# Patient Record
Sex: FEMALE | Race: WHITE | HISPANIC OR LATINO | Employment: OTHER | URBAN - METROPOLITAN AREA
[De-identification: names, ages, dates, MRNs, and addresses within clinical notes are randomized per-mention and may not be internally consistent; named-entity substitution may affect disease eponyms.]

---

## 2023-01-01 PROBLEM — N39.0 UTI (URINARY TRACT INFECTION): Status: RESOLVED | Noted: 2021-11-17 | Resolved: 2023-01-01

## 2023-08-30 ENCOUNTER — APPOINTMENT (EMERGENCY)
Dept: RADIOLOGY | Facility: HOSPITAL | Age: 73
DRG: 419 | End: 2023-08-30
Payer: COMMERCIAL

## 2023-08-30 ENCOUNTER — APPOINTMENT (EMERGENCY)
Dept: CT IMAGING | Facility: HOSPITAL | Age: 73
DRG: 419 | End: 2023-08-30
Payer: COMMERCIAL

## 2023-08-30 ENCOUNTER — HOSPITAL ENCOUNTER (INPATIENT)
Facility: HOSPITAL | Age: 73
LOS: 7 days | Discharge: HOME | DRG: 419 | End: 2023-09-07
Attending: EMERGENCY MEDICINE | Admitting: INTERNAL MEDICINE
Payer: COMMERCIAL

## 2023-08-30 DIAGNOSIS — K81.0 ACUTE CHOLECYSTITIS: ICD-10-CM

## 2023-08-30 DIAGNOSIS — R10.9 ABDOMINAL PAIN: Primary | ICD-10-CM

## 2023-08-30 DIAGNOSIS — U07.1 COVID-19: ICD-10-CM

## 2023-08-30 DIAGNOSIS — R07.9 CHEST PAIN: ICD-10-CM

## 2023-08-30 LAB
2HR DELTA HS TROPONIN: 0 NG/L
4HR DELTA HS TROPONIN: 0 NG/L
ALBUMIN SERPL BCG-MCNC: 4.2 G/DL (ref 3.5–5)
ALP SERPL-CCNC: 74 U/L (ref 34–104)
ALT SERPL W P-5'-P-CCNC: 43 U/L (ref 7–52)
ANION GAP SERPL CALCULATED.3IONS-SCNC: 9 MMOL/L
AST SERPL W P-5'-P-CCNC: 24 U/L (ref 13–39)
ATRIAL RATE: 60 BPM
BACTERIA UR QL AUTO: ABNORMAL /HPF
BASOPHILS # BLD AUTO: 0.04 THOUSANDS/ÂΜL (ref 0–0.1)
BASOPHILS NFR BLD AUTO: 0 % (ref 0–1)
BILIRUB SERPL-MCNC: 0.51 MG/DL (ref 0.2–1)
BILIRUB UR QL STRIP: NEGATIVE
BUN SERPL-MCNC: 18 MG/DL (ref 5–25)
CALCIUM SERPL-MCNC: 9.4 MG/DL (ref 8.4–10.2)
CARDIAC TROPONIN I PNL SERPL HS: 3 NG/L
CHLORIDE SERPL-SCNC: 100 MMOL/L (ref 96–108)
CLARITY UR: ABNORMAL
CO2 SERPL-SCNC: 26 MMOL/L (ref 21–32)
COLOR UR: YELLOW
CREAT SERPL-MCNC: 0.72 MG/DL (ref 0.6–1.3)
D DIMER PPP FEU-MCNC: 1.42 UG/ML FEU
EOSINOPHIL # BLD AUTO: 0.12 THOUSAND/ÂΜL (ref 0–0.61)
EOSINOPHIL NFR BLD AUTO: 1 % (ref 0–6)
ERYTHROCYTE [DISTWIDTH] IN BLOOD BY AUTOMATED COUNT: 13.6 % (ref 11.6–15.1)
FLUAV RNA RESP QL NAA+PROBE: NEGATIVE
FLUBV RNA RESP QL NAA+PROBE: NEGATIVE
GFR SERPL CREATININE-BSD FRML MDRD: 82 ML/MIN/1.73SQ M
GLUCOSE SERPL-MCNC: 267 MG/DL (ref 65–140)
GLUCOSE SERPL-MCNC: 267 MG/DL (ref 65–140)
GLUCOSE SERPL-MCNC: 282 MG/DL (ref 65–140)
GLUCOSE SERPL-MCNC: 306 MG/DL (ref 65–140)
GLUCOSE SERPL-MCNC: 352 MG/DL (ref 65–140)
GLUCOSE SERPL-MCNC: 385 MG/DL (ref 65–140)
GLUCOSE UR STRIP-MCNC: ABNORMAL MG/DL
HCT VFR BLD AUTO: 38.9 % (ref 34.8–46.1)
HGB BLD-MCNC: 13 G/DL (ref 11.5–15.4)
HGB UR QL STRIP.AUTO: ABNORMAL
IMM GRANULOCYTES # BLD AUTO: 0.05 THOUSAND/UL (ref 0–0.2)
IMM GRANULOCYTES NFR BLD AUTO: 1 % (ref 0–2)
KETONES UR STRIP-MCNC: ABNORMAL MG/DL
LACTATE SERPL-SCNC: 1.5 MMOL/L (ref 0.5–2)
LEUKOCYTE ESTERASE UR QL STRIP: ABNORMAL
LIPASE SERPL-CCNC: 72 U/L (ref 11–82)
LYMPHOCYTES # BLD AUTO: 1.43 THOUSANDS/ÂΜL (ref 0.6–4.47)
LYMPHOCYTES NFR BLD AUTO: 15 % (ref 14–44)
MAGNESIUM SERPL-MCNC: 1.6 MG/DL (ref 1.9–2.7)
MCH RBC QN AUTO: 30.2 PG (ref 26.8–34.3)
MCHC RBC AUTO-ENTMCNC: 33.4 G/DL (ref 31.4–37.4)
MCV RBC AUTO: 91 FL (ref 82–98)
MONOCYTES # BLD AUTO: 0.5 THOUSAND/ÂΜL (ref 0.17–1.22)
MONOCYTES NFR BLD AUTO: 5 % (ref 4–12)
NEUTROPHILS # BLD AUTO: 7.13 THOUSANDS/ÂΜL (ref 1.85–7.62)
NEUTS SEG NFR BLD AUTO: 78 % (ref 43–75)
NITRITE UR QL STRIP: NEGATIVE
NON-SQ EPI CELLS URNS QL MICRO: ABNORMAL /HPF
NRBC BLD AUTO-RTO: 0 /100 WBCS
P AXIS: 37 DEGREES
PH UR STRIP.AUTO: 5.5 [PH]
PHOSPHATE SERPL-MCNC: 2.9 MG/DL (ref 2.3–4.1)
PLATELET # BLD AUTO: 335 THOUSANDS/UL (ref 149–390)
PMV BLD AUTO: 9.8 FL (ref 8.9–12.7)
POTASSIUM SERPL-SCNC: 4 MMOL/L (ref 3.5–5.3)
PR INTERVAL: 170 MS
PROT SERPL-MCNC: 8 G/DL (ref 6.4–8.4)
PROT UR STRIP-MCNC: NEGATIVE MG/DL
QRS AXIS: -37 DEGREES
QRSD INTERVAL: 84 MS
QT INTERVAL: 404 MS
QTC INTERVAL: 404 MS
RBC # BLD AUTO: 4.3 MILLION/UL (ref 3.81–5.12)
RBC #/AREA URNS AUTO: ABNORMAL /HPF
RSV RNA RESP QL NAA+PROBE: NEGATIVE
SARS-COV-2 RNA RESP QL NAA+PROBE: NEGATIVE
SODIUM SERPL-SCNC: 135 MMOL/L (ref 135–147)
SP GR UR STRIP.AUTO: 1.01 (ref 1–1.03)
T WAVE AXIS: 83 DEGREES
UROBILINOGEN UR QL STRIP.AUTO: 0.2 E.U./DL
VENTRICULAR RATE: 60 BPM
WBC # BLD AUTO: 9.27 THOUSAND/UL (ref 4.31–10.16)
WBC #/AREA URNS AUTO: ABNORMAL /HPF

## 2023-08-30 PROCEDURE — 85025 COMPLETE CBC W/AUTO DIFF WBC: CPT | Performed by: EMERGENCY MEDICINE

## 2023-08-30 PROCEDURE — 99285 EMERGENCY DEPT VISIT HI MDM: CPT | Performed by: EMERGENCY MEDICINE

## 2023-08-30 PROCEDURE — 85379 FIBRIN DEGRADATION QUANT: CPT

## 2023-08-30 PROCEDURE — 74176 CT ABD & PELVIS W/O CONTRAST: CPT

## 2023-08-30 PROCEDURE — 96375 TX/PRO/DX INJ NEW DRUG ADDON: CPT

## 2023-08-30 PROCEDURE — 1124F ACP DISCUSS-NO DSCNMKR DOCD: CPT | Performed by: INTERNAL MEDICINE

## 2023-08-30 PROCEDURE — 93010 ELECTROCARDIOGRAM REPORT: CPT | Performed by: INTERNAL MEDICINE

## 2023-08-30 PROCEDURE — 83605 ASSAY OF LACTIC ACID: CPT

## 2023-08-30 PROCEDURE — 71250 CT THORAX DX C-: CPT

## 2023-08-30 PROCEDURE — 96366 THER/PROPH/DIAG IV INF ADDON: CPT

## 2023-08-30 PROCEDURE — 0241U HB NFCT DS VIR RESP RNA 4 TRGT: CPT

## 2023-08-30 PROCEDURE — 83735 ASSAY OF MAGNESIUM: CPT

## 2023-08-30 PROCEDURE — 81001 URINALYSIS AUTO W/SCOPE: CPT

## 2023-08-30 PROCEDURE — 96372 THER/PROPH/DIAG INJ SC/IM: CPT

## 2023-08-30 PROCEDURE — 96365 THER/PROPH/DIAG IV INF INIT: CPT

## 2023-08-30 PROCEDURE — 99284 EMERGENCY DEPT VISIT MOD MDM: CPT

## 2023-08-30 PROCEDURE — 82948 REAGENT STRIP/BLOOD GLUCOSE: CPT

## 2023-08-30 PROCEDURE — 80053 COMPREHEN METABOLIC PANEL: CPT | Performed by: EMERGENCY MEDICINE

## 2023-08-30 PROCEDURE — 71045 X-RAY EXAM CHEST 1 VIEW: CPT

## 2023-08-30 PROCEDURE — G1004 CDSM NDSC: HCPCS

## 2023-08-30 PROCEDURE — 99223 1ST HOSP IP/OBS HIGH 75: CPT | Performed by: INTERNAL MEDICINE

## 2023-08-30 PROCEDURE — 84484 ASSAY OF TROPONIN QUANT: CPT | Performed by: EMERGENCY MEDICINE

## 2023-08-30 PROCEDURE — 72125 CT NECK SPINE W/O DYE: CPT

## 2023-08-30 PROCEDURE — 84100 ASSAY OF PHOSPHORUS: CPT

## 2023-08-30 PROCEDURE — 93005 ELECTROCARDIOGRAM TRACING: CPT

## 2023-08-30 PROCEDURE — 83690 ASSAY OF LIPASE: CPT | Performed by: EMERGENCY MEDICINE

## 2023-08-30 PROCEDURE — 36415 COLL VENOUS BLD VENIPUNCTURE: CPT | Performed by: EMERGENCY MEDICINE

## 2023-08-30 RX ORDER — ACETAMINOPHEN 325 MG/1
650 TABLET ORAL EVERY 6 HOURS PRN
Status: DISCONTINUED | OUTPATIENT
Start: 2023-08-30 | End: 2023-09-07 | Stop reason: HOSPADM

## 2023-08-30 RX ORDER — INSULIN LISPRO 100 [IU]/ML
1-5 INJECTION, SOLUTION INTRAVENOUS; SUBCUTANEOUS EVERY 6 HOURS SCHEDULED
Status: DISCONTINUED | OUTPATIENT
Start: 2023-08-30 | End: 2023-09-01

## 2023-08-30 RX ORDER — FAMOTIDINE 10 MG/ML
20 INJECTION, SOLUTION INTRAVENOUS ONCE
Status: COMPLETED | OUTPATIENT
Start: 2023-08-30 | End: 2023-08-30

## 2023-08-30 RX ORDER — INSULIN LISPRO 100 [IU]/ML
1-6 INJECTION, SOLUTION INTRAVENOUS; SUBCUTANEOUS
Status: DISCONTINUED | OUTPATIENT
Start: 2023-08-30 | End: 2023-09-01

## 2023-08-30 RX ORDER — AMLODIPINE BESYLATE 5 MG/1
5 TABLET ORAL
Status: DISCONTINUED | OUTPATIENT
Start: 2023-08-30 | End: 2023-09-07 | Stop reason: HOSPADM

## 2023-08-30 RX ORDER — INSULIN LISPRO 100 [IU]/ML
1-5 INJECTION, SOLUTION INTRAVENOUS; SUBCUTANEOUS
Status: DISCONTINUED | OUTPATIENT
Start: 2023-08-30 | End: 2023-09-01

## 2023-08-30 RX ORDER — MAGNESIUM SULFATE HEPTAHYDRATE 40 MG/ML
2 INJECTION, SOLUTION INTRAVENOUS ONCE
Status: COMPLETED | OUTPATIENT
Start: 2023-08-30 | End: 2023-08-30

## 2023-08-30 RX ORDER — HEPARIN SODIUM 5000 [USP'U]/ML
5000 INJECTION, SOLUTION INTRAVENOUS; SUBCUTANEOUS EVERY 8 HOURS SCHEDULED
Status: DISCONTINUED | OUTPATIENT
Start: 2023-08-31 | End: 2023-09-07 | Stop reason: HOSPADM

## 2023-08-30 RX ORDER — INSULIN LISPRO 100 [IU]/ML
1-5 INJECTION, SOLUTION INTRAVENOUS; SUBCUTANEOUS EVERY 6 HOURS SCHEDULED
Status: DISCONTINUED | OUTPATIENT
Start: 2023-08-30 | End: 2023-08-30

## 2023-08-30 RX ORDER — HYDRALAZINE HYDROCHLORIDE 20 MG/ML
5 INJECTION INTRAMUSCULAR; INTRAVENOUS EVERY 6 HOURS PRN
Status: DISCONTINUED | OUTPATIENT
Start: 2023-08-30 | End: 2023-09-07 | Stop reason: HOSPADM

## 2023-08-30 RX ORDER — OXYCODONE HYDROCHLORIDE 5 MG/1
5 TABLET ORAL EVERY 4 HOURS PRN
Status: DISCONTINUED | OUTPATIENT
Start: 2023-08-30 | End: 2023-09-07 | Stop reason: HOSPADM

## 2023-08-30 RX ORDER — ONDANSETRON 2 MG/ML
2 INJECTION INTRAMUSCULAR; INTRAVENOUS ONCE
Status: COMPLETED | OUTPATIENT
Start: 2023-08-30 | End: 2023-08-30

## 2023-08-30 RX ADMIN — ACETAMINOPHEN 650 MG: 325 TABLET, FILM COATED ORAL at 20:05

## 2023-08-30 RX ADMIN — INSULIN LISPRO 2 UNITS: 100 INJECTION, SOLUTION INTRAVENOUS; SUBCUTANEOUS at 18:18

## 2023-08-30 RX ADMIN — ONDANSETRON 2 MG: 2 INJECTION INTRAMUSCULAR; INTRAVENOUS at 15:39

## 2023-08-30 RX ADMIN — INSULIN LISPRO 2 UNITS: 100 INJECTION, SOLUTION INTRAVENOUS; SUBCUTANEOUS at 14:10

## 2023-08-30 RX ADMIN — INSULIN LISPRO 3 UNITS: 100 INJECTION, SOLUTION INTRAVENOUS; SUBCUTANEOUS at 15:47

## 2023-08-30 RX ADMIN — INSULIN LISPRO 4 UNITS: 100 INJECTION, SOLUTION INTRAVENOUS; SUBCUTANEOUS at 22:07

## 2023-08-30 RX ADMIN — FAMOTIDINE 20 MG: 10 INJECTION, SOLUTION INTRAVENOUS at 14:31

## 2023-08-30 RX ADMIN — OXYCODONE HYDROCHLORIDE 5 MG: 5 TABLET ORAL at 21:41

## 2023-08-30 RX ADMIN — MORPHINE SULFATE 1 MG: 2 INJECTION, SOLUTION INTRAMUSCULAR; INTRAVENOUS at 15:39

## 2023-08-30 RX ADMIN — MORPHINE SULFATE 1 MG: 2 INJECTION, SOLUTION INTRAMUSCULAR; INTRAVENOUS at 11:32

## 2023-08-30 RX ADMIN — MAGNESIUM SULFATE HEPTAHYDRATE 2 G: 40 INJECTION, SOLUTION INTRAVENOUS at 12:22

## 2023-08-30 RX ADMIN — AMLODIPINE BESYLATE 5 MG: 5 TABLET ORAL at 22:06

## 2023-08-30 NOTE — H&P
[address] [facility]  H&P  Name: Moise Lake 74 y.o. female I MRN: [id number]  Unit/Bed#: ED 13 I Date of Admission: 8/30/2023   Date of Service: 8/30/2023 I Hospital Day: 0      Assessment/Plan   * Chest pain  Assessment & Plan  Patient initially presented with nonradiating chest pain for the past few hours  EKG without any signs of acute ischemia  Troponins negative x2  Unlikely cardiac given limited risk factors and atypical chest pain  D-dimer noted to be elevated  Does have anaphylaxis due to iodine  We will obtain VQ scan to rule out PE    Hypertension  Assessment & Plan  Blood pressure currently controlled  Continue home amlodipine and metoprolol    Diabetes [facility])  Assessment & Plan  Lab Results   Component Value Date    HGBA1C 10.3 (H) 10/31/2022       Recent Labs     08/30/23  1245 08/30/23  1409   POCGLU 306* 282*       Blood Sugar Average: Last 72 hrs:  (P) 294   Hold home p.o. agents  Sliding scale insulin  Monitor blood sugar           VTE Prophylaxis: Heparin  / sequential compression device   Code Status: full code  POLST: There is no POLST form on file for this patient (pre-hospital)  Discussion with family: pt    Anticipated Length of Stay:  Patient will be admitted on an Observation basis with an anticipated length of stay of  < 2 midnights. Justification for Hospital Stay: chest pain    Total Time for Visit, including Counseling / Coordination of Care: 60 minutes. Greater than 50% of this total time spent on direct patient counseling and coordination of care. Chief Complaint:   Chest pain    History of Present Illness:    Moise Lake is a 74 y.o. female with past medical history significant of hypertension, type 2 diabetes who initially presented with chest pain. Patient reports nonradiating left-sided chest discomfort. Reports worse with deep breathing. Denies any worsening with exertion. Otherwise denies any acute complaints.   Denies fevers, chills, cough, abdominal pain, nausea, vomiting or any other complaints. Review of Systems:    Review of Systems   Constitutional: Negative for activity change, appetite change, chills, diaphoresis, fever and unexpected weight change. HENT: Negative for congestion, facial swelling and rhinorrhea. Eyes: Negative for photophobia and visual disturbance. Respiratory: Negative for cough, shortness of breath and wheezing. Cardiovascular: Positive for chest pain. Negative for palpitations. Gastrointestinal: Negative for abdominal pain, blood in stool, constipation, diarrhea, nausea and vomiting. Genitourinary: Negative for decreased urine volume, difficulty urinating, dysuria, flank pain, frequency, hematuria and urgency. Musculoskeletal: Negative for arthralgias, back pain, joint swelling and myalgias. Neurological: Negative for dizziness, syncope, facial asymmetry, light-headedness, numbness and headaches. Psychiatric/Behavioral: Negative for confusion and decreased concentration. The patient is not nervous/anxious. Past Medical and Surgical History:     Past Medical History:   Diagnosis Date   • Diabetes mellitus ([address])    • Hypertension        History reviewed. No pertinent surgical history. Meds/Allergies:    Prior to Admission medications    Medication Sig Start Date End Date Taking?  Authorizing Provider   amLODIPine (NORVASC) 5 mg tablet Take 5 mg by mouth daily at bedtime    Historical Provider, MD   aspirin (ECOTRIN LOW STRENGTH) 81 mg EC tablet Take 81 mg by mouth daily    Historical Provider, MD   glipiZIDE (GLUCOTROL) 5 mg tablet Take 10 mg by mouth 2 (two) times a day    Historical Provider, MD   metoprolol tartrate (LOPRESSOR) 50 mg tablet Take 50 mg by mouth daily    Historical Provider, MD   sitaGLIPtin-metFORMIN (Janumet)  MG per tablet Take 1 tablet by mouth 2 (two) times a day with meals    Historical Provider, MD   temazepam (RESTORIL) 22.5 MG capsule Take 22.5 mg by mouth daily at bedtime as needed for sleep    Historical Provider, MD AKHTAR have reviewed home medications with patient personally. Allergies: Allergies   Allergen Reactions   • Cortisone Chest Pain   • Dye [Iodinated Contrast Media] Chest Pain   • Trulicity [Dulaglutide] Bradycardia       Social History:     Marital Status: /Civil Union     Patient Pre-hospital Living Situation: home  Patient Pre-hospital Level of Mobility: independent  Patient Pre-hospital Diet Restrictions: none  Substance Use History:   Social History     Substance and Sexual Activity   Alcohol Use Never     Social History     Tobacco Use   Smoking Status Never   Smokeless Tobacco Never     Social History     Substance and Sexual Activity   Drug Use Not on file       Family History:    History reviewed. No pertinent family history. Physical Exam:     Vitals:   Blood Pressure: 131/78 (08/30/23 1400)  Pulse: 58 (08/30/23 1400)  Temperature: 98.1 °F (36.7 °C) (08/30/23 0949)  Temp Source: Temporal (08/30/23 0949)  Respirations: 18 (08/30/23 1400)  SpO2: 96 % (08/30/23 1400)    Physical Exam  Constitutional:       General: She is not in acute distress. Appearance: She is well-developed. She is not diaphoretic. HENT:      Head: Normocephalic and atraumatic. Nose: Nose normal.      Mouth/Throat:      Pharynx: No oropharyngeal exudate. Eyes:      General: No scleral icterus. Right eye: No discharge. Left eye: No discharge. Conjunctiva/sclera: Conjunctivae normal.   Neck:      Thyroid: No thyromegaly. Vascular: No JVD. Cardiovascular:      Rate and Rhythm: Normal rate and regular rhythm. Heart sounds: Normal heart sounds. No murmur heard. No friction rub. No gallop. Pulmonary:      Effort: Pulmonary effort is normal. No respiratory distress. Breath sounds: Normal breath sounds. No wheezing or rales. Chest:      Chest wall: No tenderness.    Abdominal:      General: Bowel sounds are normal. There is no distension. Palpations: Abdomen is soft. Tenderness: There is no abdominal tenderness. There is no guarding or rebound. Musculoskeletal:         General: No tenderness or deformity. Normal range of motion. Cervical back: Normal range of motion and neck supple. Skin:     General: Skin is warm and dry. Findings: No erythema or rash. Neurological:      Mental Status: She is alert. Mental status is at baseline. Cranial Nerves: No cranial nerve deficit. Sensory: No sensory deficit. Motor: No abnormal muscle tone. Coordination: Coordination normal.           Additional Data:     Lab Results: I have personally reviewed pertinent reports. Results from last 7 days   Lab Units 08/30/23  0958   WBC Thousand/uL 9.27   HEMOGLOBIN g/dL 13.0   HEMATOCRIT % 38.9   PLATELETS Thousands/uL 335   NEUTROS PCT % 78*   LYMPHS PCT % 15   MONOS PCT % 5   EOS PCT % 1     Results from last 7 days   Lab Units 08/30/23  0958   SODIUM mmol/L 135   POTASSIUM mmol/L 4.0   CHLORIDE mmol/L 100   CO2 mmol/L 26   BUN mg/dL 18   CREATININE mg/dL 0.72   ANION GAP mmol/L 9   CALCIUM mg/dL 9.4   ALBUMIN g/dL 4.2   TOTAL BILIRUBIN mg/dL 0.51   ALK PHOS U/L 74   ALT U/L 43   AST U/L 24   GLUCOSE RANDOM mg/dL 385*         Results from last 7 days   Lab Units 08/30/23  1409 08/30/23  1245   POC GLUCOSE mg/dl 282* 306*         Results from last 7 days   Lab Units 08/30/23  1000   LACTIC ACID mmol/L 1.5       Imaging: I have personally reviewed pertinent reports. CT spine cervical without contrast   Final Result by Reggie Squires MD (08/30 1315)      No cervical spine fracture or traumatic malalignment. Please see same-day CT chest abdomen pelvis and CT recon only thoracolumbar no charge for further evaluation. Additional chronic/incidental findings as detailed above.                   Workstation performed: [id number]         CT recon only thoracolumbar   Final Result by Justice Hernandes MD (08/30 1328)      No acute osseous abnormality of thoracic or lumbar spine. Chronic appearing L2 superior endplate compression fracture deformity with mild height loss. Chronic appearing L3 superior endplate compression fracture deformity with minimal height loss. Suspect severe canal stenosis at L3-L4. Degenerative changes, as detailed above. Please see same-day CT cervical spine and CT chest abdomen pelvis without contrast for further evaluation. The study was marked in [facility] for immediate notification. Workstation performed: [id number]         CT chest abdomen pelvis wo contrast   Final Result by Christophe Martinez MD (08/30 1319)      No acute findings in the chest, abdomen or the pelvis. Workstation performed: [id number]         XR chest 1 view portable   Final Result by Tyesha Yun MD (08/30 1034)      No acute cardiopulmonary disease. Findings are stable            Workstation performed: [id number]             EKG, Pathology, and Other Studies Reviewed on Admission:   · EKG: reviewed    Allscripts / Epic Records Reviewed: Yes     ** Please Note: This note has been constructed using a voice recognition system.  **

## 2023-08-30 NOTE — ASSESSMENT & PLAN NOTE
Lab Results   Component Value Date    HGBA1C 10.3 (H) 10/31/2022       Recent Labs     08/30/23  1245 08/30/23  1409   POCGLU 306* 282*       Blood Sugar Average: Last 72 hrs:  (P) 294   Hold home p.o. agents  Sliding scale insulin  Monitor blood sugar

## 2023-08-30 NOTE — ED PROVIDER NOTES
History  Chief Complaint   Patient presents with   • Abdominal Pain     Patient presents with c/o RUQ abdominal pain, chest pain and vomiting - onset this morning. Constant pressure mid chest, non radiating. +Cardiac history. 75 yo female with a PMHx of DMII, HTN, CAD with previous cardiac caths with balloon angioplasty without stenting (2011 & 2016?), heartburn, fatty liver, chronic back pain 2/2 herniated discs, neuropathy of BLE, and insomnia who presents for symptoms of chest pain, N/V, back pain, and abdominal pain. She states she started throwing up multiple times this morning, non-bilious, no hematemesis. She states that she has a history chest pain and abdominal pain but that it is more constant and intense, and unrelieved by OTC tums and her usual warm lemon water. Patient states her chest pain is slightly left sternal, non-radiating, and initially felt like there was a "weight on my chest", but denies that feeling currently. She reports increased CP discomfort with deep inspiration. She denies associated symptoms of diaphoresis, arm numbness/tingling, or heartburn. She reports her abdominal pain is mostly in the RUQ/RLQ; non-radiating, constant 8/10; increased pain of RLQ with palpation, no rebound tenderness, cannot describe the type of pain. Abdomen not distended or rigid. The patient is also complaining of 10/10 back pain that is present from her neck to her mid back; she denies radiation of pain laterally; denies ripping or tearing sensation. She states that her chronic back pain is usually in her lower back. She denies recent trauma or fall. This morning she states that she voided "excessively" and that her mouth feels dry. Patient hypertensive and slightly tachypneic upon presentation. Patient denies alcohol, illicit drugs, marijuana, or tobacco use. States she did not take her home medications this morning 2/2 N/V. She denies difficulty swallowing.           Prior to Admission Medications Prescriptions Last Dose Informant Patient Reported? Taking? amLODIPine (NORVASC) 5 mg tablet   Yes No   Sig: Take 5 mg by mouth daily at bedtime   aspirin (ECOTRIN LOW STRENGTH) 81 mg EC tablet   Yes No   Sig: Take 81 mg by mouth daily   glipiZIDE (GLUCOTROL) 5 mg tablet   Yes No   Sig: Take 10 mg by mouth 2 (two) times a day   metoprolol tartrate (LOPRESSOR) 50 mg tablet   Yes No   Sig: Take 50 mg by mouth daily   sitaGLIPtin-metFORMIN (Janumet)  MG per tablet   Yes No   Sig: Take 1 tablet by mouth 2 (two) times a day with meals   temazepam (RESTORIL) 22.5 MG capsule   Yes No   Sig: Take 22.5 mg by mouth daily at bedtime as needed for sleep      Facility-Administered Medications: None       Past Medical History:   Diagnosis Date   • Diabetes mellitus ([address])    • Hypertension        History reviewed. No pertinent surgical history. History reviewed. No pertinent family history. I have reviewed and agree with the history as documented. E-Cigarette/Vaping     E-Cigarette/Vaping Substances     Social History     Tobacco Use   • Smoking status: Never   • Smokeless tobacco: Never   Substance Use Topics   • Alcohol use: Never        Review of Systems   Constitutional: Negative for fever. Respiratory: Positive for shortness of breath (mild). Negative for cough, chest tightness and wheezing. Cardiovascular: Positive for chest pain (slightly left of mid-sterum; constant, non-radiating, 8/10, cannot describe characteristics of pain. ). Negative for palpitations and leg swelling. Gastrointestinal: Positive for abdominal pain (RUQ, RLQ; constant, non-radiating, 8/10, cannot describe characteristics of pain), nausea and vomiting (non-bilious, non-bloody). Negative for abdominal distention, blood in stool and diarrhea. Genitourinary: Negative for decreased urine volume, dysuria, flank pain and pelvic pain.         Pt states she felt like she urinated a larger volume than usual this morning   Musculoskeletal: Positive for back pain (acute pain extending in cervical region through mid lumbar region. Pain in center spinal region without radiation. ). Negative for neck pain and neck stiffness. Skin: Negative for rash and wound. Allergic/Immunologic:        Allergy to iodinated contrast dye   Neurological: Positive for numbness (BLE chronic neuropathy L>R). Negative for dizziness, weakness, light-headedness and headaches. Psychiatric/Behavioral: Positive for sleep disturbance (chronic ). The patient is nervous/anxious. Physical Exam  ED Triage Vitals   Temperature Pulse Respirations Blood Pressure SpO2   08/30/23 0949 08/30/23 0948 08/30/23 0948 08/30/23 0948 08/30/23 0948   98.1 °F (36.7 °C) 72 22 (!) 184/84 97 %      Temp Source Heart Rate Source Patient Position - Orthostatic VS BP Location FiO2 (%)   08/30/23 0948 08/30/23 0948 08/30/23 0948 08/30/23 0948 --   Oral Monitor Sitting Right arm       Pain Score       08/30/23 1132       10 - Worst Possible Pain             Orthostatic Vital Signs  Vitals:    08/30/23 1200 08/30/23 1400 08/30/23 1430 08/30/23 1500   BP: (!) 172/102 131/78 (!) 185/82 (!) 188/82   Pulse: (!) 54 58 56 61   Patient Position - Orthostatic VS:           Physical Exam  Vitals reviewed. Constitutional:       General: She is not in acute distress. Appearance: She is ill-appearing. HENT:      Head: Normocephalic and atraumatic. Eyes:      Extraocular Movements: Extraocular movements intact. Pupils: Pupils are equal, round, and reactive to light. Cardiovascular:      Rate and Rhythm: Regular rhythm. Bradycardia present. Heart sounds: Normal heart sounds. No murmur heard. No friction rub. No gallop. Pulmonary:      Breath sounds: Normal breath sounds. No wheezing, rhonchi or rales. Comments: Slightly tachypneic  Abdominal:      General: Abdomen is flat. Bowel sounds are normal. There is no distension. Palpations: Abdomen is soft.  There is no pulsatile mass.      Tenderness: There is abdominal tenderness in the right upper quadrant and right lower quadrant. There is no guarding or rebound. Negative signs include Rivera's sign, McBurney's sign and psoas sign. Hernia: No hernia is present. Musculoskeletal:      Cervical back: Neck supple. No erythema, signs of trauma, rigidity or crepitus. No spinous process tenderness or muscular tenderness. Skin:     General: Skin is warm and dry. Capillary Refill: Capillary refill takes less than 2 seconds. Neurological:      General: No focal deficit present. Mental Status: She is alert and oriented to person, place, and time. Psychiatric:         Mood and Affect: Mood is anxious.          ED Medications  Medications   insulin lispro (HumaLOG) 100 units/mL subcutaneous injection 1-5 Units (2 Units Subcutaneous Given 8/30/23 1410)   heparin (porcine) subcutaneous injection 5,000 Units (has no administration in time range)   acetaminophen (TYLENOL) tablet 650 mg (has no administration in time range)   insulin lispro (HumaLOG) 100 units/mL subcutaneous injection 1-6 Units (has no administration in time range)   insulin lispro (HumaLOG) 100 units/mL subcutaneous injection 1-5 Units (has no administration in time range)   morphine injection 1 mg (1 mg Intravenous Given 8/30/23 1132)   magnesium sulfate 2 g/50 mL IVPB (premix) 2 g (0 g Intravenous Stopped 8/30/23 1428)   Famotidine (PF) (PEPCID) injection 20 mg (20 mg Intravenous Given 8/30/23 1431)       Diagnostic Studies  Results Reviewed     Procedure Component Value Units Date/Time    HS Troponin I 4hr [[id number]]  (Normal) Collected: 08/30/23 1437    Lab Status: Final result Specimen: Blood from Arm, Left Updated: 08/30/23 1511     hs TnI 4hr 3 ng/L      Delta 4hr hsTnI 0 ng/L     Urine Microscopic [[id number]]  (Abnormal) Collected: 08/30/23 1414    Lab Status: Final result Specimen: Urine, Straight Cath Updated: 08/30/23 1435     RBC, UA 2-4 /hpf      WBC, UA 2-4 /hpf      Epithelial Cells None Seen /hpf      Bacteria, UA Occasional /hpf     UA w Reflex to Microscopic w Reflex to Culture [[id number]]  (Abnormal) Collected: 08/30/23 1414    Lab Status: Final result Specimen: Urine, Straight Cath Updated: 08/30/23 1434     Color, UA Yellow     Clarity, UA Slightly Cloudy     Specific Gravity, UA 1.010     pH, UA 5.5     Leukocytes, UA Elevated glucose may cause decreased leukocyte values. See urine microscopic for UWBC result     Nitrite, UA Negative     Protein, UA Negative mg/dl      Glucose, UA >=1000 (1%) mg/dl      Ketones, UA Trace mg/dl      Urobilinogen, UA 0.2 E.U./dl      Bilirubin, UA Negative     Occult Blood, UA Moderate    Fingerstick Glucose (POCT) [[id number]]  (Abnormal) Collected: 08/30/23 1409    Lab Status: Final result Updated: 08/30/23 1410     POC Glucose 282 mg/dl     FLU/RSV/COVID - if FLU/RSV clinically relevant [[id number]]  (Normal) Collected: 08/30/23 1232    Lab Status: Final result Specimen: Nares from Nose Updated: 08/30/23 1318     SARS-CoV-2 Negative     INFLUENZA A PCR Negative     INFLUENZA B PCR Negative     RSV PCR Negative    Narrative:      FOR PEDIATRIC PATIENTS - copy/paste COVID Guidelines URL to browser: [email or web address]    SARS-CoV-2 assay is a Nucleic Acid Amplification assay intended for the  qualitative detection of nucleic acid from SARS-CoV-2 in nasopharyngeal  swabs. Results are for the presumptive identification of SARS-CoV-2 RNA. Positive results are indicative of infection with SARS-CoV-2, the virus  causing COVID-19, but do not rule out bacterial infection or co-infection  with other viruses. Laboratories within the [facility] and its  territories are required to report all positive results to the appropriate  public health authorities.  Negative results do not preclude SARS-CoV-2  infection and should not be used as the sole basis for treatment or other  patient management decisions. Negative results must be combined with  clinical observations, patient history, and epidemiological information. This test has not been FDA cleared or approved. This test has been authorized by FDA under an Emergency Use Authorization  (EUA). This test is only authorized for the duration of time the  declaration that circumstances exist justifying the authorization of the  emergency use of an in vitro diagnostic tests for detection of SARS-CoV-2  virus and/or diagnosis of COVID-19 infection under section 564(b)(1) of  the Act, 21 U. S.C. 998SGE-9(J)(3), unless the authorization is terminated  or revoked sooner. The test has been validated but independent review by FDA  and CLIA is pending. Test performed using [employer]: This RT-PCR assay targets N2,  a region unique to SARS-CoV-2. A conserved region in the E-gene was chosen  for pan-Sarbecovirus detection which includes SARS-CoV-2. According to CMS-2020-01-R, this platform meets the definition of high-throughput technology. HS Troponin I 2hr [[id number]]  (Normal) Collected: 08/30/23 1232    Lab Status: Final result Specimen: Blood from Arm, Left Updated: 08/30/23 1306     hs TnI 2hr 3 ng/L      Delta 2hr hsTnI 0 ng/L     Fingerstick Glucose (POCT) [[id number]]  (Abnormal) Collected: 08/30/23 1245    Lab Status: Final result Updated: 08/30/23 1245     POC Glucose 306 mg/dl     Phosphorus [[id number]]  (Normal) Collected: 08/30/23 0958    Lab Status: Final result Specimen: Blood from Arm, Left Updated: 08/30/23 1116     Phosphorus 2.9 mg/dL     D-dimer, quantitative [[id number]]  (Abnormal) Collected: 08/30/23 0958    Lab Status: Final result Specimen: Blood from Arm, Left Updated: 08/30/23 1105     D-Dimer, Quant 1.42 ug/ml FEU     Narrative:       In the evaluation for possible pulmonary embolism, in the appropriate (Well's Score of 4 or less) patient, the age adjusted d-dimer cutoff for this patient can be calculated as:    Age x 0.01 (in ug/mL) for Age-adjusted D-dimer exclusion threshold for a patient over 50 years. HS Troponin 0hr (reflex protocol) [[id number]]  (Normal) Collected: 08/30/23 0958    Lab Status: Final result Specimen: Blood from Arm, Left Updated: 08/30/23 1032     hs TnI 0hr 3 ng/L     Comprehensive metabolic panel [[id number]]  (Abnormal) Collected: 08/30/23 0958    Lab Status: Final result Specimen: Blood from Arm, Left Updated: 08/30/23 1026     Sodium 135 mmol/L      Potassium 4.0 mmol/L      Chloride 100 mmol/L      CO2 26 mmol/L      ANION GAP 9 mmol/L      BUN 18 mg/dL      Creatinine 0.72 mg/dL      Glucose 385 mg/dL      Calcium 9.4 mg/dL      AST 24 U/L      ALT 43 U/L      Alkaline Phosphatase 74 U/L      Total Protein 8.0 g/dL      Albumin 4.2 g/dL      Total Bilirubin 0.51 mg/dL      eGFR 82 ml/min/1.73sq m     Narrative:      [place] guidelines for Chronic Kidney Disease (CKD):   •  Stage 1 with normal or high GFR (GFR > 90 mL/min/1.73 square meters)  •  Stage 2 Mild CKD (GFR = 60-89 mL/min/1.73 square meters)  •  Stage 3A Moderate CKD (GFR = 45-59 mL/min/1.73 square meters)  •  Stage 3B Moderate CKD (GFR = 30-44 mL/min/1.73 square meters)  •  Stage 4 Severe CKD (GFR = 15-29 mL/min/1.73 square meters)  •  Stage 5 End Stage CKD (GFR <15 mL/min/1.73 square meters)  Note: GFR calculation is accurate only with a steady state creatinine    Lipase [[id number]]  (Normal) Collected: 08/30/23 0958    Lab Status: Final result Specimen: Blood from Arm, Left Updated: 08/30/23 1026     Lipase 72 u/L     Lactic acid, plasma (w/reflex if result > 2.0) [[id number]]  (Normal) Collected: 08/30/23 1000    Lab Status: Final result Specimen: Blood from Arm, Left Updated: 08/30/23 1026     LACTIC ACID 1.5 mmol/L     Narrative:      Result may be elevated if tourniquet was used during collection.     Magnesium [[id number]]  (Abnormal) Collected: 08/30/23 1000    Lab Status: Final result Specimen: Blood from Arm, Left Updated: 08/30/23 1024     Magnesium 1.6 mg/dL     CBC and differential [[id number]]  (Abnormal) Collected: 08/30/23 0958    Lab Status: Final result Specimen: Blood from Arm, Left Updated: 08/30/23 1008     WBC 9.27 Thousand/uL      RBC 4.30 Million/uL      Hemoglobin 13.0 g/dL      Hematocrit 38.9 %      MCV 91 fL      MCH 30.2 pg      MCHC 33.4 g/dL      RDW 13.6 %      MPV 9.8 fL      Platelets 768 Thousands/uL      nRBC 0 /100 WBCs      Neutrophils Relative 78 %      Immat GRANS % 1 %      Lymphocytes Relative 15 %      Monocytes Relative 5 %      Eosinophils Relative 1 %      Basophils Relative 0 %      Neutrophils Absolute 7.13 Thousands/µL      Immature Grans Absolute 0.05 Thousand/uL      Lymphocytes Absolute 1.43 Thousands/µL      Monocytes Absolute 0.50 Thousand/µL      Eosinophils Absolute 0.12 Thousand/µL      Basophils Absolute 0.04 Thousands/µL                  CT spine cervical without contrast   Final Result by Bonnye Aschoff, MD (08/30 1315)      No cervical spine fracture or traumatic malalignment. Please see same-day CT chest abdomen pelvis and CT recon only thoracolumbar no charge for further evaluation. Additional chronic/incidental findings as detailed above. Workstation performed: [id number]         CT recon only thoracolumbar   Final Result by Bonnye Aschoff, MD (08/30 1328)      No acute osseous abnormality of thoracic or lumbar spine. Chronic appearing L2 superior endplate compression fracture deformity with mild height loss. Chronic appearing L3 superior endplate compression fracture deformity with minimal height loss. Suspect severe canal stenosis at L3-L4. Degenerative changes, as detailed above. Please see same-day CT cervical spine and CT chest abdomen pelvis without contrast for further evaluation. The study was marked in [facility] for immediate notification.                Workstation performed: [id number]         CT chest abdomen pelvis wo contrast   Final Result by Nida Curtis MD (08/30 1319)      No acute findings in the chest, abdomen or the pelvis. Workstation performed: [id number]         XR chest 1 view portable   Final Result by Sandra Campbell MD (08/30 1034)      No acute cardiopulmonary disease. Findings are stable            Workstation performed: [id number]               Procedures  ECG 12 Lead Documentation Only    Date/Time: 8/30/2023 12:08 PM    Performed by: Praveena Madison  Authorized by: NISHI Pineda    Indications / Diagnosis:  Chest pain  ECG reviewed by me, the ED Provider: yes    Patient location:  ED  Previous ECG:     Previous ECG:  Compared to current    Similarity:  No change  Interpretation:     Interpretation: normal    Rhythm:     Rhythm: sinus rhythm    ST segments:     ST segments:  Normal        ED Course         Medical Decision Making  DDx: ACS, pulmonary embolus, aortic dissection, esophageal rupture, pericarditis, cholecystitis, hepatitis, gastric/duodenal ulcer, UTI, appendicitis. ED course:   - CBC, CMP, mag, phos, iCa+, lactic acid, troponin, lipase, d-dimer  - UA with micro  - 12 lead EKG  - CXR  - Continuous cardiac monitoring and continuous pulse oximetry  - Family refusing IV contrast dye for CT imaging due to history of bradycardia, hypotension, and requirement of critical care post IV contrast dye injection. Allan Talbot MD and Jeferson Burris discussed risk and benefits of possible life-threatening conditions that might not be observable on imaging without contrast dye, but due to hx of what is described as anaphylactic symptoms related to IV contrast dye, ED team will start workup with CT imaging of the chest, abdomen, pelvis and c-spine with thoracolumbar spine recon. Family and patient agree to CT imaging without contrast and understand the associated risks.     Interpretation: CXR unremarkable for acute findings; clear lung fields bilaterally, no pleural effusions, no pneumothorax, non-widened mediastinum, aortic notch without obvious abnormalities, cardiac silhouette normal size. 12-lead EKG shows NSR, without significant ST elevation/depression, QTc 404, HR 60, no heart block noted. BMP unremarkable; BUN/sCR normal and at baseline without concern for LOWELL. Patient hyperglycemic at 385. Normal liver enzymes. Initial trop 3. Mag slightly low at 1.6 likely in the setting of electrolyte derangement from decreased oral intake and vomiting. No leukocytosis or anemia noted on CBC with decreased concern for active infection. D-dimer elevated at 1.42 with concerns for possible PE. Per official Radiology reports: CT CAP wo shows hepatomegaly, cardiomegaly, no thoracic aortic aneurysm, and gallstones within the gallbladder without pericholecystic inflammatory changes, non-obstructive 4mm R upper pole renal calculus; overall no acute findings in the chest, abdomen, or pelvis. CT thoracolumbar wo showed chronic L2-L3 superior endplate compression fracture, and severe canal stenosis at L3-L4. Based on imaging and physical exam findings, low suspicion for ACS, acute cholecystitis, acute hepatitis, acute appendicitis, or esophageal rupture. Final dx and recommendations: Undifferentiated abdominal pain, chest pain, and back pain. Cannot rule out aortic dissection or PE at this time due to limitation of imaging without use of contrast 2/2 patient allergy. Recommending admitting to Observation with SLIM for ongoing imaging and workup. Follow up with outstanding labs. Patient and son updated about MDM at bedside. Amount and/or Complexity of Data Reviewed  Independent Historian:      Details: Son at bedside  External Data Reviewed: labs, radiology and ECG. Labs: ordered. Decision-making details documented in ED Course. Radiology: ordered. Decision-making details documented in ED Course.   ECG/medicine tests: independent interpretation performed. Decision-making details documented in ED Course. Risk  Prescription drug management. Decision regarding hospitalization. Risk Details: Patient's son at bedside, and patient's daughter via phone describe symptoms of anaphylaxis when patient has recived IV contarst dye in the past for procedures. Disposition  Final diagnoses:   Abdominal pain   Chest pain     Time reflects when diagnosis was documented in both MDM as applicable and the Disposition within this note     Time User Action Codes Description Comment    8/30/2023  3:03 PM Leocadia Geovani Add [R10.9] Abdominal pain     8/30/2023  3:03 PM Leocadia Geovani Add [R07.9] Chest pain       ED Disposition     ED Disposition   Admit    Condition   Stable    Date/Time   Wed Aug 30, 2023  3:03 PM    Comment   Case was discussed with Dr. Nicolasa Balderas and the patient's admission status was agreed to be Admission Status: observation status to the service of Dr. Nicolasa Balderas . Follow-up Information    None         Patient's Medications   Discharge Prescriptions    No medications on file     No discharge procedures on file. PDMP Review       Value Time User    PDMP Reviewed  Yes 10/30/2022  6:57 PM Mark Kamara MD           ED Provider  Attending physically available and evaluated Juju HoweMaricruz I managed the patient along with the ED Attending.     Electronically Signed by    Katarzyna Montague, [id number]  08/30/23 0175

## 2023-08-30 NOTE — ASSESSMENT & PLAN NOTE
Patient initially presented with nonradiating chest pain for the past few hours  EKG without any signs of acute ischemia  Troponins negative x2  Unlikely cardiac given limited risk factors and atypical chest pain  D-dimer noted to be elevated  Does have anaphylaxis due to iodine  We will obtain VQ scan to rule out PE

## 2023-08-30 NOTE — PLAN OF CARE
Problem: GASTROINTESTINAL - ADULT  Goal: Minimal or absence of nausea and/or vomiting  Description: INTERVENTIONS:  - Administer IV fluids if ordered to ensure adequate hydration  - Maintain NPO status until nausea and vomiting are resolved  - Nasogastric tube if ordered  - Administer ordered antiemetic medications as needed  - Provide nonpharmacologic comfort measures as appropriate  - Advance diet as tolerated, if ordered  - Consider nutrition services referral to assist patient with adequate nutrition and appropriate food choices  Outcome: Progressing  Goal: Maintains or returns to baseline bowel function  Description: INTERVENTIONS:  - Assess bowel function  - Encourage oral fluids to ensure adequate hydration  - Administer IV fluids if ordered to ensure adequate hydration  - Administer ordered medications as needed  - Encourage mobilization and activity  - Consider nutritional services referral to assist patient with adequate nutrition and appropriate food choices  Outcome: Progressing  Goal: Maintains adequate nutritional intake  Description: INTERVENTIONS:  - Monitor percentage of each meal consumed  - Identify factors contributing to decreased intake, treat as appropriate  - Assist with meals as needed  - Monitor I&O, weight, and lab values if indicated  - Obtain nutrition services referral as needed  Outcome: Progressing     Problem: Nutrition/Hydration-ADULT  Goal: Nutrient/Hydration intake appropriate for improving, restoring or maintaining nutritional needs  Description: Monitor and assess patient's nutrition/hydration status for malnutrition. Collaborate with interdisciplinary team and initiate plan and interventions as ordered. Monitor patient's weight and dietary intake as ordered or per policy. Utilize nutrition screening tool and intervene as necessary. Determine patient's food preferences and provide high-protein, high-caloric foods as appropriate.      INTERVENTIONS:  - Monitor oral intake, urinary output, labs, and treatment plans  - Assess nutrition and hydration status and recommend course of action  - Evaluate amount of meals eaten  - Assist patient with eating if necessary   - Allow adequate time for meals  - Recommend/ encourage appropriate diets, oral nutritional supplements, and vitamin/mineral supplements  - Order, calculate, and assess calorie counts as needed  - Recommend, monitor, and adjust tube feedings and TPN/PPN based on assessed needs  - Assess need for intravenous fluids  - Provide specific nutrition/hydration education as appropriate  - Include patient/family/caregiver in decisions related to nutrition  Outcome: Progressing     Problem: METABOLIC, FLUID AND ELECTROLYTES - ADULT  Goal: Glucose maintained within target range  Description: INTERVENTIONS:  - Monitor Blood Glucose as ordered  - Assess for signs and symptoms of hyperglycemia and hypoglycemia  - Administer ordered medications to maintain glucose within target range  - Assess nutritional intake and initiate nutrition service referral as needed  Outcome: Progressing     Problem: PAIN - ADULT  Goal: Verbalizes/displays adequate comfort level or baseline comfort level  Description: Interventions:  - Encourage patient to monitor pain and request assistance  - Assess pain using appropriate pain scale  - Administer analgesics based on type and severity of pain and evaluate response  - Implement non-pharmacological measures as appropriate and evaluate response  - Consider cultural and social influences on pain and pain management  - Notify physician/advanced practitioner if interventions unsuccessful or patient reports new pain  Outcome: Progressing     Problem: INFECTION - ADULT  Goal: Absence or prevention of progression during hospitalization  Description: INTERVENTIONS:  - Assess and monitor for signs and symptoms of infection  - Monitor lab/diagnostic results  - Monitor all insertion sites, i.e. indwelling lines, tubes, and drains  - Monitor endotracheal if appropriate and nasal secretions for changes in amount and color  - [place] appropriate cooling/warming therapies per order  - Administer medications as ordered  - Instruct and encourage patient and family to use good hand hygiene technique  - Identify and instruct in appropriate isolation precautions for identified infection/condition  Outcome: Progressing  Goal: Absence of fever/infection during neutropenic period  Description: INTERVENTIONS:  - Monitor WBC    Outcome: Progressing     Problem: SAFETY ADULT  Goal: Patient will remain free of falls  Description: INTERVENTIONS:  - Educate patient/family on patient safety including physical limitations  - Instruct patient to call for assistance with activity   - Consult OT/PT to assist with strengthening/mobility   - Keep Call bell within reach  - Keep bed low and locked with side rails adjusted as appropriate  - Keep care items and personal belongings within reach  - Initiate and maintain comfort rounds  - Apply yellow socks and bracelet for high fall risk patients  - Consider moving patient to room near nurses station  Outcome: Progressing  Goal: Maintain or return to baseline ADL function  Description: INTERVENTIONS:  -  Assess patient's ability to carry out ADLs; assess patient's baseline for ADL function and identify physical deficits which impact ability to perform ADLs (bathing, care of mouth/teeth, toileting, grooming, dressing, etc.)  - Assess/evaluate cause of self-care deficits   - Assess range of motion  - Assess patient's mobility; develop plan if impaired  - Assess patient's need for assistive devices and provide as appropriate  - Encourage maximum independence but intervene and supervise when necessary  - Involve family in performance of ADLs  - Assess for home care needs following discharge   - Consider OT consult to assist with ADL evaluation and planning for discharge  - Provide patient education as appropriate  Outcome: Progressing  Goal: Maintains/Returns to pre admission functional level  Description: INTERVENTIONS:  - Perform BMAT or MOVE assessment daily.   - Set and communicate daily mobility goal to care team and patient/family/caregiver. - Collaborate with rehabilitation services on mobility goals if consulted  - Out of bed for toileting  - Record patient progress and toleration of activity level   Outcome: Progressing     Problem: DISCHARGE PLANNING  Goal: Discharge to home or other facility with appropriate resources  Description: INTERVENTIONS:  - Identify barriers to discharge w/patient and caregiver  - Arrange for needed discharge resources and transportation as appropriate  - Identify discharge learning needs (meds, wound care, etc.)  - Arrange for interpretive services to assist at discharge as needed  - Refer to Case Management Department for coordinating discharge planning if the patient needs post-hospital services based on physician/advanced practitioner order or complex needs related to functional status, cognitive ability, or social support system  Outcome: Progressing     Problem: Knowledge Deficit  Goal: Patient/family/caregiver demonstrates understanding of disease process, treatment plan, medications, and discharge instructions  Description: Complete learning assessment and assess knowledge base.   Interventions:  - Provide teaching at level of understanding  - Provide teaching via preferred learning methods  Outcome: Progressing

## 2023-08-31 ENCOUNTER — APPOINTMENT (OUTPATIENT)
Dept: NUCLEAR MEDICINE | Facility: HOSPITAL | Age: 73
DRG: 419 | End: 2023-08-31
Payer: COMMERCIAL

## 2023-08-31 ENCOUNTER — APPOINTMENT (OUTPATIENT)
Dept: VASCULAR ULTRASOUND | Facility: HOSPITAL | Age: 73
DRG: 419 | End: 2023-08-31
Payer: COMMERCIAL

## 2023-08-31 PROBLEM — R79.89 ELEVATED D-DIMER: Status: ACTIVE | Noted: 2023-08-31

## 2023-08-31 LAB
ANION GAP SERPL CALCULATED.3IONS-SCNC: 9 MMOL/L
BASOPHILS # BLD AUTO: 0.03 THOUSANDS/ÂΜL (ref 0–0.1)
BASOPHILS NFR BLD AUTO: 0 % (ref 0–1)
BUN SERPL-MCNC: 17 MG/DL (ref 5–25)
CA-I BLD-SCNC: 1.12 MMOL/L (ref 1.12–1.32)
CALCIUM SERPL-MCNC: 9 MG/DL (ref 8.4–10.2)
CHLORIDE SERPL-SCNC: 101 MMOL/L (ref 96–108)
CO2 SERPL-SCNC: 25 MMOL/L (ref 21–32)
CREAT SERPL-MCNC: 0.59 MG/DL (ref 0.6–1.3)
EOSINOPHIL # BLD AUTO: 0.04 THOUSAND/ÂΜL (ref 0–0.61)
EOSINOPHIL NFR BLD AUTO: 0 % (ref 0–6)
ERYTHROCYTE [DISTWIDTH] IN BLOOD BY AUTOMATED COUNT: 14 % (ref 11.6–15.1)
EST. AVERAGE GLUCOSE BLD GHB EST-MCNC: 206 MG/DL
GFR SERPL CREATININE-BSD FRML MDRD: 89 ML/MIN/1.73SQ M
GLUCOSE P FAST SERPL-MCNC: 283 MG/DL (ref 65–99)
GLUCOSE SERPL-MCNC: 228 MG/DL (ref 65–140)
GLUCOSE SERPL-MCNC: 264 MG/DL (ref 65–140)
GLUCOSE SERPL-MCNC: 269 MG/DL (ref 65–140)
GLUCOSE SERPL-MCNC: 283 MG/DL (ref 65–140)
GLUCOSE SERPL-MCNC: 340 MG/DL (ref 65–140)
GLUCOSE SERPL-MCNC: 342 MG/DL (ref 65–140)
GLUCOSE SERPL-MCNC: 352 MG/DL (ref 65–140)
GLUCOSE SERPL-MCNC: 377 MG/DL (ref 65–140)
GLUCOSE SERPL-MCNC: 389 MG/DL (ref 65–140)
GLUCOSE SERPL-MCNC: 447 MG/DL (ref 65–140)
HBA1C MFR BLD: 8.8 %
HCT VFR BLD AUTO: 34.5 % (ref 34.8–46.1)
HGB BLD-MCNC: 11.6 G/DL (ref 11.5–15.4)
IMM GRANULOCYTES # BLD AUTO: 0.06 THOUSAND/UL (ref 0–0.2)
IMM GRANULOCYTES NFR BLD AUTO: 1 % (ref 0–2)
LYMPHOCYTES # BLD AUTO: 1.3 THOUSANDS/ÂΜL (ref 0.6–4.47)
LYMPHOCYTES NFR BLD AUTO: 10 % (ref 14–44)
MCH RBC QN AUTO: 30.5 PG (ref 26.8–34.3)
MCHC RBC AUTO-ENTMCNC: 33.6 G/DL (ref 31.4–37.4)
MCV RBC AUTO: 91 FL (ref 82–98)
MONOCYTES # BLD AUTO: 1.33 THOUSAND/ÂΜL (ref 0.17–1.22)
MONOCYTES NFR BLD AUTO: 11 % (ref 4–12)
NEUTROPHILS # BLD AUTO: 9.84 THOUSANDS/ÂΜL (ref 1.85–7.62)
NEUTS SEG NFR BLD AUTO: 78 % (ref 43–75)
NRBC BLD AUTO-RTO: 0 /100 WBCS
PLATELET # BLD AUTO: 286 THOUSANDS/UL (ref 149–390)
PMV BLD AUTO: 9.9 FL (ref 8.9–12.7)
POTASSIUM SERPL-SCNC: 3.9 MMOL/L (ref 3.5–5.3)
RBC # BLD AUTO: 3.8 MILLION/UL (ref 3.81–5.12)
SODIUM SERPL-SCNC: 135 MMOL/L (ref 135–147)
WBC # BLD AUTO: 12.6 THOUSAND/UL (ref 4.31–10.16)

## 2023-08-31 PROCEDURE — 80048 BASIC METABOLIC PNL TOTAL CA: CPT | Performed by: INTERNAL MEDICINE

## 2023-08-31 PROCEDURE — 82948 REAGENT STRIP/BLOOD GLUCOSE: CPT

## 2023-08-31 PROCEDURE — 99232 SBSQ HOSP IP/OBS MODERATE 35: CPT | Performed by: INTERNAL MEDICINE

## 2023-08-31 PROCEDURE — 85025 COMPLETE CBC W/AUTO DIFF WBC: CPT | Performed by: INTERNAL MEDICINE

## 2023-08-31 PROCEDURE — 93970 EXTREMITY STUDY: CPT

## 2023-08-31 PROCEDURE — 93970 EXTREMITY STUDY: CPT | Performed by: SURGERY

## 2023-08-31 PROCEDURE — 83036 HEMOGLOBIN GLYCOSYLATED A1C: CPT | Performed by: NURSE PRACTITIONER

## 2023-08-31 PROCEDURE — 82330 ASSAY OF CALCIUM: CPT

## 2023-08-31 RX ORDER — INSULIN LISPRO 100 [IU]/ML
3 INJECTION, SOLUTION INTRAVENOUS; SUBCUTANEOUS ONCE
Status: COMPLETED | OUTPATIENT
Start: 2023-08-31 | End: 2023-08-31

## 2023-08-31 RX ORDER — TEMAZEPAM 15 MG/1
30 CAPSULE ORAL
Status: DISCONTINUED | OUTPATIENT
Start: 2023-08-31 | End: 2023-09-07 | Stop reason: HOSPADM

## 2023-08-31 RX ADMIN — HEPARIN SODIUM 5000 UNITS: 5000 INJECTION INTRAVENOUS; SUBCUTANEOUS at 14:10

## 2023-08-31 RX ADMIN — Medication 2.5 MG: at 14:52

## 2023-08-31 RX ADMIN — INSULIN LISPRO 7 UNITS: 100 INJECTION, SOLUTION INTRAVENOUS; SUBCUTANEOUS at 17:12

## 2023-08-31 RX ADMIN — ASPIRIN 81 MG: 81 TABLET, COATED ORAL at 08:00

## 2023-08-31 RX ADMIN — INSULIN LISPRO 2 UNITS: 100 INJECTION, SOLUTION INTRAVENOUS; SUBCUTANEOUS at 00:07

## 2023-08-31 RX ADMIN — AMLODIPINE BESYLATE 5 MG: 5 TABLET ORAL at 23:11

## 2023-08-31 RX ADMIN — TEMAZEPAM 30 MG: 15 CAPSULE ORAL at 23:11

## 2023-08-31 RX ADMIN — INSULIN LISPRO 6 UNITS: 100 INJECTION, SOLUTION INTRAVENOUS; SUBCUTANEOUS at 15:48

## 2023-08-31 RX ADMIN — INSULIN LISPRO 3 UNITS: 100 INJECTION, SOLUTION INTRAVENOUS; SUBCUTANEOUS at 14:09

## 2023-08-31 RX ADMIN — TEMAZEPAM 30 MG: 15 CAPSULE ORAL at 01:13

## 2023-08-31 RX ADMIN — INSULIN LISPRO 4 UNITS: 100 INJECTION, SOLUTION INTRAVENOUS; SUBCUTANEOUS at 23:11

## 2023-08-31 RX ADMIN — HEPARIN SODIUM 5000 UNITS: 5000 INJECTION INTRAVENOUS; SUBCUTANEOUS at 23:11

## 2023-08-31 RX ADMIN — INSULIN LISPRO 2 UNITS: 100 INJECTION, SOLUTION INTRAVENOUS; SUBCUTANEOUS at 05:52

## 2023-08-31 RX ADMIN — Medication 2.5 MG: at 00:07

## 2023-08-31 RX ADMIN — OXYCODONE HYDROCHLORIDE 5 MG: 5 TABLET ORAL at 05:38

## 2023-08-31 RX ADMIN — HEPARIN SODIUM 5000 UNITS: 5000 INJECTION INTRAVENOUS; SUBCUTANEOUS at 05:38

## 2023-08-31 RX ADMIN — INSULIN LISPRO 4 UNITS: 100 INJECTION, SOLUTION INTRAVENOUS; SUBCUTANEOUS at 11:49

## 2023-08-31 RX ADMIN — INSULIN LISPRO 3 UNITS: 100 INJECTION, SOLUTION INTRAVENOUS; SUBCUTANEOUS at 08:01

## 2023-08-31 RX ADMIN — INSULIN LISPRO 6 UNITS: 100 INJECTION, SOLUTION INTRAVENOUS; SUBCUTANEOUS at 10:34

## 2023-08-31 RX ADMIN — OXYCODONE HYDROCHLORIDE 5 MG: 5 TABLET ORAL at 01:32

## 2023-08-31 RX ADMIN — INSULIN LISPRO 6 UNITS: 100 INJECTION, SOLUTION INTRAVENOUS; SUBCUTANEOUS at 17:08

## 2023-08-31 RX ADMIN — ACETAMINOPHEN 650 MG: 325 TABLET, FILM COATED ORAL at 20:48

## 2023-08-31 RX ADMIN — ACETAMINOPHEN 650 MG: 325 TABLET, FILM COATED ORAL at 08:00

## 2023-08-31 NOTE — UTILIZATION REVIEW
Initial Clinical Review    OBS order 8/30 1428 converted to IP on 8/31 1514 for continued cardiac workup, VQ scan      Admission: Date/Time/Statement:   Admission Orders (From admission, onward)     Ordered        08/31/23 1514  Inpatient Admission  Once            08/30/23 1428  Place in Observation  Once                       Inpatient Admission     Standing Status:   Standing     Number of Occurrences:   1     Order Specific Question:   Level of Care     Answer:   Med Surg [16]     Order Specific Question:   Estimated length of stay     Answer:   More than 2 Midnights     Order Specific Question:   Certification     Answer:   I certify that inpatient services are medically necessary for this patient for a duration of greater than two midnights. See H&P and MD Progress Notes for additional information about the patient's course of treatment. ED Arrival Information     Expected   -    Arrival   8/30/2023 09:36    Acuity   Urgent            Means of arrival   Walk-In    Escorted by   Family Member    Service   Hospitalist    Admission type   Emergency            Arrival complaint   chest pain            Chief Complaint   Patient presents with   • Abdominal Pain     Patient presents with c/o RUQ abdominal pain, chest pain and vomiting - onset this morning. Constant pressure mid chest, non radiating. +Cardiac history. Initial Presentation: 74 y.o. female to ED from home w/ CP , worse w/ deep inspiration . PMHX HTN , DM . EKG neg for acute ischemia , trop neg, d-dimer elevated  . Admitted OBS status w/ CP plan for VQ scan . HTN cont home meds. DM SSI and monitor . 8/31 IM Note   Elevated d-dimer , check dopplers . F/u VQ scan . Date:9/1    Day 3: Has surpassed a 2nd midnight with active treatments and services, which include  still complaining of right upper quadrant abdominal pain which has worsened over the past 24 hours. F/u BC , cont ceftriaxone and flagyl . LE doppler neg , VQ scan pending .  Wbc 15.08. cont IVF .      ED Triage Vitals   Temperature Pulse Respirations Blood Pressure SpO2   08/30/23 0949 08/30/23 0948 08/30/23 0948 08/30/23 0948 08/30/23 0948   98.1 °F (36.7 °C) 72 22 (!) 184/84 97 %      Temp Source Heart Rate Source Patient Position - Orthostatic VS BP Location FiO2 (%)   08/30/23 0948 08/30/23 0948 08/30/23 0948 08/30/23 0948 --   Oral Monitor Sitting Right arm       Pain Score       08/30/23 1132       10 - Worst Possible Pain          Wt Readings from Last 1 Encounters:   11/02/22 69.3 kg (152 lb 12.5 oz)     Additional Vital Signs:   09/01/23 07:20:10 99.5 °F (37.5 °C) 71 -- 123/55 78 94 % -- --   08/31/23 2300 98.5 °F (36.9 °C) 81 -- -- -- 95 % -- --   08/31/23 21:21:26 99.3 °F (37.4 °C) 84 17 116/76 89 95 % -- --   08/31/23 20:40:40 100.8 °F (38.2 °C) Abnormal  82 -- 108/76 87 95 % -- --   08/31/23 2000 -- -- -- -- -- -- None (Room air) --   08/31/23 14:42:31 98 °F (36.7 °C) 81 -- 143/77 99 96 % -- --   08/31/23 11:53:42 97.8 °F (36.6 °C) 71 -- -- -- 97 % -- --   08/31/23 10:40:07 99.3 °F (37.4 °C) 82 20 136/76 96 97 % -- --   08/31/23 09:54:47 98.4 °F (36.9 °C) -- -- 134/83 100 -- -- --   08/31/23 09:51:32 98.4 °F (36.9 °C) 93 -- -- -- 95 % -- --   08/31/23 0800 -- -- -- -- -- 95 % None (Room air)        08/31/23 07:20:02 100.1 °F (37.8 °C) 81 18 142/72 95 90 % -- --   08/30/23 21:14:07 98.9 °F (37.2 °C) 70 17 172/82 Abnormal  112 94 % -- --   08/30/23 1630 -- -- -- -- -- 96 % -- --   08/30/23 16:17:10 97.4 °F (36.3 °C) Abnormal  60 -- 161/83 109 97 % None (Room air) --   08/30/23 1530 -- 55 20 154/70 100 97 % -- --   08/30/23 1500 -- 61 19 188/82 Abnormal  118 94 % -- --   08/30/23 1430 -- 56 19 185/82 Abnormal  118 95 % -- --   08/30/23 1400 -- 58 18 131/78 100 96 % -- --   08/30/23 1200 -- 54 Abnormal  16 172/102 Abnormal  132 98 % -- --   08/30/23 1132 -- -- -- -- -- 98 % -- --   08/30/23 0949 98.1 °F (36.7 °C) -- -- -- -- -- --        Pertinent Labs/Diagnostic Test Results: 8/31 duplex RIGHT LOWER LIMB:  No evidence of acute or chronic deep vein thrombosis. No evidence of superficial thrombophlebitis noted. Doppler evaluation shows a normal response to augmentation maneuvers. .  Popliteal, posterior tibial and anterior tibial arterial Doppler waveform's are  triphasic/biphasic. LEFT LOWER LIMB:  No evidence of acute or chronic deep vein thrombosis. No evidence of superficial thrombophlebitis noted. Doppler evaluation shows a normal response to augmentation maneuvers. Popliteal, posterior tibial and anterior tibial arterial Doppler waveform's are  biphasic/monophasic. 9/1 RUQ US  Abnormal gallbladder, with calculi, sludge, gallbladder wall thickening, positive Rivera sign, and small amount of pericholecystic fluid. Findings highly suspicious for acute cholecystitis in the appropriate clinical setting     8/30 EKG Normal sinus rhythm  Left axis deviation  Anterior infarct (cited on or before 30-AUG-2023)  Abnormal ECG  VAS lower limb venous duplex study, complete bilateral   RIGHT LOWER LIMB:  No evidence of acute or chronic deep vein thrombosis. No evidence of superficial thrombophlebitis noted. Doppler evaluation shows a normal response to augmentation maneuvers. .  Popliteal, posterior tibial and anterior tibial arterial Doppler waveform's are  triphasic/biphasic. LEFT LOWER LIMB:  No evidence of acute or chronic deep vein thrombosis. No evidence of superficial thrombophlebitis noted. Doppler evaluation shows a normal response to augmentation maneuvers. Popliteal, posterior tibial and anterior tibial arterial Doppler waveform's are  biphasic/monophasic. CT spine cervical without contrast   Final Result by Johnathon Goodman MD (08/30 5013)      No cervical spine fracture or traumatic malalignment. Please see same-day CT chest abdomen pelvis and CT recon only thoracolumbar no charge for further evaluation.       Additional chronic/incidental findings as detailed above. Workstation performed: [id number]         CT recon only thoracolumbar   Final Result by Lucio Martinez MD (08/30 1328)      No acute osseous abnormality of thoracic or lumbar spine. Chronic appearing L2 superior endplate compression fracture deformity with mild height loss. Chronic appearing L3 superior endplate compression fracture deformity with minimal height loss. Suspect severe canal stenosis at L3-L4. Degenerative changes, as detailed above. Please see same-day CT cervical spine and CT chest abdomen pelvis without contrast for further evaluation. The study was marked in [facility] for immediate notification. Workstation performed: [id number]         CT chest abdomen pelvis wo contrast   Final Result by Juliana Murillo MD (08/30 1319)      No acute findings in the chest, abdomen or the pelvis. Workstation performed: [id number]         XR chest 1 view portable   Final Result by Ace Delgado MD (08/30 1034)      No acute cardiopulmonary disease.       Findings are stable            Workstation performed: [id number]         NM lung ventilation / perfusion    (Results Pending)     Results from last 7 days   Lab Units 08/30/23  1232   SARS-COV-2  Negative     Results from last 7 days   Lab Units 09/01/23  0452 08/31/23  0540 08/30/23  0958   WBC Thousand/uL 15.08* 12.60* 9.27   HEMOGLOBIN g/dL 11.2* 11.6 13.0   HEMATOCRIT % 34.6* 34.5* 38.9   PLATELETS Thousands/uL 257 286 335   NEUTROS ABS Thousands/µL 10.98* 9.84* 7.13         Results from last 7 days   Lab Units 09/01/23  1138 08/31/23  0540 08/30/23  1000 08/30/23  0958   SODIUM mmol/L 131* 135  --  135   POTASSIUM mmol/L 3.7 3.9  --  4.0   CHLORIDE mmol/L 99 101  --  100   CO2 mmol/L 25 25  --  26   ANION GAP mmol/L 7 9  --  9   BUN mg/dL 15 17  --  18   CREATININE mg/dL 0.73 0.59*  --  0.72   EGFR ml/min/1.73sq m 80 89  --  82   CALCIUM mg/dL 8.6 9.0  --  9.4   CALCIUM, IONIZED mmol/L  --  1.12  --   --    MAGNESIUM mg/dL  --   --  1.6*  --    PHOSPHORUS mg/dL  --   --   --  2.9     Results from last 7 days   Lab Units 09/01/23  1138 08/30/23  0958   AST U/L 11* 24   ALT U/L 23 43   ALK PHOS U/L 61 74   TOTAL PROTEIN g/dL 6.8 8.0   ALBUMIN g/dL 3.4* 4.2   TOTAL BILIRUBIN mg/dL 0.54 0.51     Results from last 7 days   Lab Units 09/01/23  1217 09/01/23  1102 09/01/23  0718 09/01/23  0452 08/31/23  2345 08/31/23  2108 08/31/23  1654 08/31/23  1520 08/31/23  1106 08/31/23  1033 08/31/23  0718 08/31/23  0535   POC GLUCOSE mg/dl 370* 429* 279* 300* 352* 342* 340* 377* 389* 447* 264* 269*     Results from last 7 days   Lab Units 09/01/23  1138 08/31/23  0540 08/30/23  0958   GLUCOSE RANDOM mg/dL 402* 283* 385*     Results from last 7 days   Lab Units 08/30/23  1437 08/30/23  1232 08/30/23  0958   HS TNI 0HR ng/L  --   --  3   HS TNI 2HR ng/L  --  3  --    HSTNI D2 ng/L  --  0  --    HS TNI 4HR ng/L 3  --   --    HSTNI D4 ng/L 0  --   --      Results from last 7 days   Lab Units 08/30/23  0958   D-DIMER QUANTITATIVE ug/ml FEU 1.42*     Results from last 7 days   Lab Units 08/30/23  1000   LACTIC ACID mmol/L 1.5       Results from last 7 days   Lab Units 08/30/23  0958   LIPASE u/L 72     Results from last 7 days   Lab Units 08/30/23  1414   CLARITY UA  Slightly Cloudy   COLOR UA  Yellow   SPEC GRAV UA  1.010   PH UA  5.5   GLUCOSE UA mg/dl >=1000 (1%)*   KETONES UA mg/dl Trace*   BLOOD UA  Moderate*   PROTEIN UA mg/dl Negative   NITRITE UA  Negative   BILIRUBIN UA  Negative   UROBILINOGEN UA E.U./dl 0.2   LEUKOCYTES UA  Elevated glucose may cause decreased leukocyte values.  See urine microscopic for UWBC result*   WBC UA /hpf 2-4*   RBC UA /hpf 2-4*   BACTERIA UA /hpf Occasional   EPITHELIAL CELLS WET PREP /hpf None Seen     Results from last 7 days   Lab Units 08/30/23  1232   INFLUENZA A PCR  Negative   INFLUENZA B PCR  Negative   RSV PCR  Negative ED Treatment:   Medication Administration from 08/30/2023 0935 to 08/30/2023 1610       Date/Time Order Dose Route Action     08/30/2023 1132 EDT morphine injection 1 mg 1 mg Intravenous Given     08/30/2023 1222 EDT magnesium sulfate 2 g/50 mL IVPB (premix) 2 g 2 g Intravenous New Bag     08/30/2023 1410 EDT insulin lispro (HumaLOG) 100 units/mL subcutaneous injection 1-5 Units 2 Units Subcutaneous Given     08/30/2023 1431 EDT Famotidine (PF) (PEPCID) injection 20 mg 20 mg Intravenous Given     08/30/2023 1547 EDT insulin lispro (HumaLOG) 100 units/mL subcutaneous injection 1-6 Units 3 Units Subcutaneous Given     08/30/2023 1539 EDT ondansetron (ZOFRAN) injection 2 mg 2 mg Intravenous Given     08/30/2023 1539 EDT morphine injection 1 mg 1 mg Intravenous Given        Past Medical History:   Diagnosis Date   • Diabetes mellitus ([address])    • Hypertension      Present on Admission:  • Diabetes ([address])  • Hypertension  • Abdominal pain  • Elevated d-dimer      Admitting Diagnosis: Chest pain [R07.9]  Abdominal pain [R10.9]  Age/Sex: 74 y.o. female  Admission Orders:  Scheduled Medications:  amLODIPine, 5 mg, Oral, HS  aspirin, 81 mg, Oral, Daily  heparin (porcine), 5,000 Units, Subcutaneous, Q8H [address]  insulin lispro, 1-5 Units, Subcutaneous, Q6H NESS  insulin lispro, 1-5 Units, Subcutaneous, HS  insulin lispro, 1-6 Units, Subcutaneous, TID AC      Continuous IV Infusions:  sodium chloride, 75 mL/hr, Intravenous, Continuous      PRN Meds:  acetaminophen, 650 mg, Oral, Q6H PRN  hydrALAZINE, 5 mg, Intravenous, Q6H PRN  oxyCODONE, 5 mg, Oral, Q4H PRN  oxyCODONE, 2.5 mg, Oral, Q4H PRN  temazepam, 30 mg, Oral, HS PRN      Fingerstick ac and hs   Tele   Reg diet   I&O   Up and OOB   IP CONSULT TO ACUTE CARE SURGERY    Network Utilization Review Department  ATTENTION: Please call with any questions or concerns to [phone] and carefully listen to the prompts so that you are directed to the right person.  All voicemails are confidential.  Preet Rubio all requests for admission clinical reviews, approved or denied determinations and any other requests to dedicated fax number below belonging to the campus where the patient is receiving treatment.  List of dedicated fax numbers for the Facilities:  [place] DENIALS (Administrative/Medical Necessity) [phone] [address] (Maternity/NICU/Pediatrics) [phone]   [address] [place] [address] [phone]   [facility] [address] [facility] [phone]   [facility] [address] [place] [address] [id number] [place] [address] [phone]   [address] [facility] [address] [phone]

## 2023-08-31 NOTE — NURSING NOTE
Patient said she went to the bathroom two times today. On bladder scan it showed >420 ml. Ambulated patient to bathroom and ran running water to attempt to void. Patient voided moderate amount. Re scanned bladders can showing 120. Will continue to monitor.

## 2023-08-31 NOTE — ASSESSMENT & PLAN NOTE
· Denied any chest pain on exam  · EKG without signs of ST elevation/depression or changes from last EKG in October  · Troponin x3 negative  · D-Dimer elevated at 1.42

## 2023-08-31 NOTE — NURSING NOTE
Patient had mild fever this morning 100.1. Tylenol given temperature rechecked 98.4. Patient complains of feeling overheated. Skin warm to touch temperature rechecked 98.4. Will continue to monitor fever curve and wbc.

## 2023-08-31 NOTE — PROGRESS NOTES
[address]  Progress Note  Name: Spenser Graves I  MRN: [id number]  Unit/Bed#: -95 I Date of Admission: 8/30/2023   Date of Service: 8/31/2023  Hospital Day: 0    Assessment/Plan   * Chest pain  Assessment & Plan  · Denied any chest pain on exam  · EKG without signs of ST elevation/depression or changes from last EKG in October  · Troponin x3 negative  · D-Dimer elevated at 1.42    Elevated d-dimer  Assessment & Plan  · Present on admission, evidenced by a d-dimer of 1.42  · Unable to have a CTA Chest in setting of allergy to Iodine  · V/Q scan ordered, but patient and family concerned about reaction even after education by nuclear medicine and myself that this is the test that is carried out if patient has iodine allergy. Patient and family will think if they would like to do the test come tomorrow  · will check lower extremity Dopplers at this time    Hypertension  Assessment & Plan  · Chronic  · Blood pressure currently controlled  · Continue home amlodipine and metoprolol  · Monitor vital signs closely    Diabetes [facility])  Assessment & Plan  Lab Results   Component Value Date    HGBA1C 10.3 (H) 10/31/2022       Recent Labs     08/30/23  2112 08/31/23  0009 08/31/23  0535 08/31/23  0718   POCGLU 352* 228* 269* 264*       Blood Sugar Average: Last 72 hrs:  (P) 279.375     • Chronic, uncontrolled, as evidenced by a hemoglobin A1C of 10.3  • Hold home oral anti-diabetics  • Continue patient on insulin sliding scale  • Monitor blood glucose AC/HS  • Hypoglycemia protocol             VTE Pharmacologic Prophylaxis: VTE Score: 6 Moderate Risk (Score 3-4) - Pharmacological DVT Prophylaxis Ordered: heparin. Patient Centered Rounds: I performed bedside rounds with nursing staff today. Discussions with Specialists or Other Care Team Provider: Case management    Education and Discussions with Family / Patient: Updated  (son and daughter) via phone.     Total Time Spent on Date of Encounter in care of patient: 38 minutes This time was spent on one or more of the following: performing physical exam; counseling and coordination of care; obtaining or reviewing history; documenting in the medical record; reviewing/ordering tests, medications or procedures; communicating with other healthcare professionals and discussing with patient's family/caregivers. Current Length of Stay: 0 day(s)  Current Patient Status: Observation   Certification Statement: The patient will continue to require additional inpatient hospital stay due to Chest pain and elevated D-dimer  Discharge Plan: Anticipate discharge tomorrow to home. Code Status: Level 1 - Full Code    Subjective:   Patient resting comfortably on examination. Patient denied any shortness of breath or further chest pain on exam.    Objective:     Vitals:   Temp (24hrs), Av.6 °F (37 °C), Min:97.4 °F (36.3 °C), Max:100.1 °F (37.8 °C)    Temp:  [97.4 °F (36.3 °C)-100.1 °F (37.8 °C)] 97.8 °F (36.6 °C)  HR:  [55-93] 71  Resp:  [17-20] 20  BP: (131-188)/(70-83) 136/76  SpO2:  [90 %-97 %] 97 %  There is no height or weight on file to calculate BMI. Input and Output Summary (last 24 hours): Intake/Output Summary (Last 24 hours) at 2023 1315  Last data filed at 2023 1717  Gross per 24 hour   Intake 120 ml   Output 500 ml   Net -380 ml       Physical Exam:   Physical Exam  Vitals and nursing note reviewed. Constitutional:       General: She is not in acute distress. Appearance: She is well-developed. HENT:      Head: Normocephalic and atraumatic. Eyes:      General: No scleral icterus. Conjunctiva/sclera: Conjunctivae normal.   Cardiovascular:      Rate and Rhythm: Normal rate and regular rhythm. Heart sounds: Normal heart sounds. No murmur heard. No friction rub. No gallop. Pulmonary:      Effort: Pulmonary effort is normal. No respiratory distress. Breath sounds: Normal breath sounds.  No wheezing or rales. Abdominal:      General: Bowel sounds are normal. There is no distension. Palpations: Abdomen is soft. Tenderness: There is no abdominal tenderness. Musculoskeletal:         General: Normal range of motion. Skin:     General: Skin is warm. Findings: No rash. Neurological:      Mental Status: She is alert and oriented to person, place, and time. Additional Data:     Labs:  Results from last 7 days   Lab Units 08/31/23  0540   WBC Thousand/uL 12.60*   HEMOGLOBIN g/dL 11.6   HEMATOCRIT % 34.5*   PLATELETS Thousands/uL 286   NEUTROS PCT % 78*   LYMPHS PCT % 10*   MONOS PCT % 11   EOS PCT % 0     Results from last 7 days   Lab Units 08/31/23  0540 08/30/23  0958   SODIUM mmol/L 135 135   POTASSIUM mmol/L 3.9 4.0   CHLORIDE mmol/L 101 100   CO2 mmol/L 25 26   BUN mg/dL 17 18   CREATININE mg/dL 0.59* 0.72   ANION GAP mmol/L 9 9   CALCIUM mg/dL 9.0 9.4   ALBUMIN g/dL  --  4.2   TOTAL BILIRUBIN mg/dL  --  0.51   ALK PHOS U/L  --  74   ALT U/L  --  43   AST U/L  --  24   GLUCOSE RANDOM mg/dL 283* 385*         Results from last 7 days   Lab Units 08/31/23  1106 08/31/23  1033 08/31/23  0718 08/31/23  0535 08/31/23  0009 08/30/23  2112 08/30/23  1621 08/30/23  1538 08/30/23  1409 08/30/23  1245   POC GLUCOSE mg/dl 389* 447* 264* 269* 228* 352* 267* 267* 282* 306*     Results from last 7 days   Lab Units 08/31/23  0540   HEMOGLOBIN A1C % 8.8*     Results from last 7 days   Lab Units 08/30/23  1000   LACTIC ACID mmol/L 1.5       Lines/Drains:  Invasive Devices     Peripheral Intravenous Line  Duration           Peripheral IV 08/30/23 Left Antecubital 1 day                      Imaging: No pertinent imaging reviewed.     Recent Cultures (last 7 days):         Last 24 Hours Medication List:   Current Facility-Administered Medications   Medication Dose Route Frequency Provider Last Rate   • acetaminophen  650 mg Oral Q6H PRN Abelino Sandhu MD     • amLODIPine  5 mg Oral HS Abelino Sandhu MD     • aspirin  81 mg Oral Daily Abelino Sandhu MD     • heparin (porcine)  5,000 Units Subcutaneous Q8H [facility] Abelino Sandhu MD     • hydrALAZINE  5 mg Intravenous Q6H PRN NISHI Hathaway     • insulin lispro  1-5 Units Subcutaneous Q6H [facility] NISHI Hathaway     • insulin lispro  1-5 Units Subcutaneous HS Abelino Sandhu MD     • insulin lispro  1-6 Units Subcutaneous TID AC Abelino Sandhu MD     • insulin lispro  3 Units Subcutaneous Once Mely Hurtado, DO     • oxyCODONE  5 mg Oral Q4H PRN Abelino Sandhu MD     • oxyCODONE  2.5 mg Oral Q4H PRN Abelino Sandhu MD     • temazepam  30 mg Oral HS PRN Jose Franco PA-C          Today, Patient Was Seen By: Mely Hurtado DO    **Please Note: This note may have been constructed using a voice recognition system. **

## 2023-08-31 NOTE — NURSING NOTE
Patient came back from lung scan. Patient feeling not her self, hot to touch and on the phone with her daughter. Daughter states patient communicated with her that she was not feeling herself. Vitals taken temp 99.3 /76 oxygen 97 HR 85. Patient placed on 1 liter NC and sitting upright. Ice packs placed under arms and neck. Blood glucose checked 447. 6 units of insulin given. Provider made aware and will come evaluate patient. Will continue to monitor.

## 2023-08-31 NOTE — CASE MANAGEMENT
Case Management Assessment & Discharge Planning Note    Patient name Kajal Mom  Location /-28 MRN [id number]  : 1950 Date 2023       Current Admission Date: 2023  Current Admission Diagnosis:Chest pain   Patient Active Problem List    Diagnosis Date Noted   • Elevated d-dimer 2023   • Lightheadedness 10/30/2022   • Chest pain 2021   • Near syncope 2021   • Diabetes ([address]) 2021   • Hypertension 2021   • Elevated TSH 2021   • Hyperglycemia 2021      LOS (days): 0  Geometric Mean LOS (GMLOS) (days):   Days to GMLOS:     OBJECTIVE:              Current admission status: Observation       Preferred Pharmacy:   [address] [address]  Phone: [phone] Fax: [phone]    Primary Care Provider: No primary care provider on file. Primary Insurance: [facility]  Secondary Insurance: [place] MEDICAID    ASSESSMENT:  [address], [address] Representative - Other   Primary Phone: [phone] (Home)               Advance Directives  Does patient have a [address]?: Yes  Does patient have Advance Directives?: Yes  Advance Directives: Power of  for health care  Primary Contact: DANIELITO, [address] Representative - Other  Primary Phone: [phone]         Readmission Root Cause  30 Day Readmission: No    Patient Information  Admitted from[de-identified] Home  Mental Status: Alert  During Assessment patient was accompanied by: Not accompanied during assessment  Assessment information provided by[de-identified] Patient  Primary Caregiver: Self  Support Systems: Self, [place] of Residence: [address] [place] [address] do you live in?: Pt is currently staying in [place] but splits her time from [place] and [place] as she cares for her elderly mother. Home entry access options.  Select all that apply.: Stairs  Number of steps to enter home.: 3  Do the steps have railings?: Yes  Type of Current Residence: 2 story home  Upon entering residence, is there a bedroom on the main floor (no further steps)?: No  A bedroom is located on the following floor levels of residence (select all that apply):: 2nd Floor  Upon entering residence, is there a bathroom on the main floor (no further steps)?: No  Indicate which floors of current residence have a bathroom (select all the apply):: 2nd Floor  Number of steps to 2nd floor from main floor: One Flight  In the last 12 months, was there a time when you were not able to pay the mortgage or rent on time?: No  In the last 12 months, how many places have you lived?: 2 (splits her time between [facility] and [place])  In the last 12 months, was there a time when you did not have a steady place to sleep or slept in a shelter (including now)?: No  Homeless/housing insecurity resource given?: No  Living Arrangements: Lives w/ Spouse/significant other, Lives w/ Extended Family  Is patient a ?: No    Activities of Daily Living Prior to Admission  Functional Status: Independent  Completes ADLs independently?: Yes  Ambulates independently?: Yes  Does patient use assisted devices?: No  Does patient currently own DME?: No  Does patient have a history of Outpatient Therapy (PT/OT)?: No  Does the patient have a history of Short-Term Rehab?: No  Does patient have a history of HHC?: No  Does patient currently have [facility]?: No         Patient Information Continued  Income Source: SSI/SSD  Does patient have prescription coverage?: Yes  Within the past 12 months, you worried that your food would run out before you got the money to buy more.: Never true  Within the past 12 months, the food you bought just didn't last and you didn't have money to get more.: Never true  Food insecurity resource given?: No  Does patient receive dialysis treatments?: No  Does patient have a history of substance abuse?: No  Does patient have a history of Mental Health Diagnosis?: No         Means of Transportation  Means of Transport to Appts[de-identified] Family transport  In the past 12 months, has lack of transportation kept you from medical appointments or from getting medications?: No  In the past 12 months, has lack of transportation kept you from meetings, work, or from getting things needed for daily living?: No  Was application for public transport provided?: No        DISCHARGE DETAILS:    Discharge planning discussed with[de-identified] Pt at bedside  [place] of Choice: Yes  Comments - Freedom of Choice: CM met with pt at bedside and introduced self/role. Pt is [place] speaking only in which this CM is fluent in. Pt is alert and oriented x3 anna to make her needs known and encouraged to do so. FOC discussed with pt at length. Pt states she is independent with all of her ADLs and IADLs. Pt has no DME or hx of VNA or STR. Pt states she follows up with her providers in [facility] as she splits her time from [facility] and [place]. Pt has no CM needs at this time. CM continues to follow.   CM contacted family/caregiver?: Yes (Message was left with pt )  Were Treatment Team discharge recommendations reviewed with patient/caregiver?: Yes  Did patient/caregiver verbalize understanding of patient care needs?: Yes  Were patient/caregiver advised of the risks associated with not following Treatment Team discharge recommendations?: Yes    Contacts  Patient Contacts: son Elizabeth Franks  Relationship to Patient[de-identified] Family  Contact Method: Phone  Phone Number: [phone]  Reason/Outcome: Continuity of Care, Discharge [facility]         Is the patient interested in [facility] [address] at discharge?: No    DME Referral Provided  Referral made for DME?: No    Other Referral/Resources/Interventions Provided:  Interventions: None Indicated  Referral Comments: No referrals made at this time    Would you like to participate in our [facility] service program?  : No - Declined    Treatment Team Recommendation: Home  Discharge Destination Plan[de-identified] Home  Transport at Discharge : Family

## 2023-08-31 NOTE — ASSESSMENT & PLAN NOTE
· Present on admission, evidenced by a d-dimer of 1.42  · Unable to have a CTA Chest in setting of allergy to Iodine  · V/Q scan ordered, but patient and family concerned about reaction even after education by nuclear medicine and myself that this is the test that is carried out if patient has iodine allergy.   Patient and family will think if they would like to do the test come tomorrow  · will check lower extremity Dopplers at this time

## 2023-08-31 NOTE — PLAN OF CARE
Problem: GASTROINTESTINAL - ADULT  Goal: Minimal or absence of nausea and/or vomiting  Description: INTERVENTIONS:  - Administer IV fluids if ordered to ensure adequate hydration  - Maintain NPO status until nausea and vomiting are resolved  - Nasogastric tube if ordered  - Administer ordered antiemetic medications as needed  - Provide nonpharmacologic comfort measures as appropriate  - Advance diet as tolerated, if ordered  - Consider nutrition services referral to assist patient with adequate nutrition and appropriate food choices  Outcome: Progressing  Goal: Maintains or returns to baseline bowel function  Description: INTERVENTIONS:  - Assess bowel function  - Encourage oral fluids to ensure adequate hydration  - Administer IV fluids if ordered to ensure adequate hydration  - Administer ordered medications as needed  - Encourage mobilization and activity  - Consider nutritional services referral to assist patient with adequate nutrition and appropriate food choices  Outcome: Progressing  Goal: Maintains adequate nutritional intake  Description: INTERVENTIONS:  - Monitor percentage of each meal consumed  - Identify factors contributing to decreased intake, treat as appropriate  - Assist with meals as needed  - Monitor I&O, weight, and lab values if indicated  - Obtain nutrition services referral as needed  Outcome: Progressing     Problem: Nutrition/Hydration-ADULT  Goal: Nutrient/Hydration intake appropriate for improving, restoring or maintaining nutritional needs  Description: Monitor and assess patient's nutrition/hydration status for malnutrition. Collaborate with interdisciplinary team and initiate plan and interventions as ordered. Monitor patient's weight and dietary intake as ordered or per policy. Utilize nutrition screening tool and intervene as necessary. Determine patient's food preferences and provide high-protein, high-caloric foods as appropriate.      INTERVENTIONS:  - Monitor oral intake, urinary output, labs, and treatment plans  - Assess nutrition and hydration status and recommend course of action  - Evaluate amount of meals eaten  - Assist patient with eating if necessary   - Allow adequate time for meals  - Recommend/ encourage appropriate diets, oral nutritional supplements, and vitamin/mineral supplements  - Order, calculate, and assess calorie counts as needed  - Recommend, monitor, and adjust tube feedings and TPN/PPN based on assessed needs  - Assess need for intravenous fluids  - Provide specific nutrition/hydration education as appropriate  - Include patient/family/caregiver in decisions related to nutrition  Outcome: Progressing     Problem: METABOLIC, FLUID AND ELECTROLYTES - ADULT  Goal: Glucose maintained within target range  Description: INTERVENTIONS:  - Monitor Blood Glucose as ordered  - Assess for signs and symptoms of hyperglycemia and hypoglycemia  - Administer ordered medications to maintain glucose within target range  - Assess nutritional intake and initiate nutrition service referral as needed  Outcome: Progressing     Problem: PAIN - ADULT  Goal: Verbalizes/displays adequate comfort level or baseline comfort level  Description: Interventions:  - Encourage patient to monitor pain and request assistance  - Assess pain using appropriate pain scale  - Administer analgesics based on type and severity of pain and evaluate response  - Implement non-pharmacological measures as appropriate and evaluate response  - Consider cultural and social influences on pain and pain management  - Notify physician/advanced practitioner if interventions unsuccessful or patient reports new pain  Outcome: Progressing     Problem: INFECTION - ADULT  Goal: Absence or prevention of progression during hospitalization  Description: INTERVENTIONS:  - Assess and monitor for signs and symptoms of infection  - Monitor lab/diagnostic results  - Monitor all insertion sites, i.e. indwelling lines, tubes, and drains  - Monitor endotracheal if appropriate and nasal secretions for changes in amount and color  - [place] appropriate cooling/warming therapies per order  - Administer medications as ordered  - Instruct and encourage patient and family to use good hand hygiene technique  - Identify and instruct in appropriate isolation precautions for identified infection/condition  Outcome: Progressing  Goal: Absence of fever/infection during neutropenic period  Description: INTERVENTIONS:  - Monitor WBC    Outcome: Progressing     Problem: SAFETY ADULT  Goal: Patient will remain free of falls  Description: INTERVENTIONS:  - Educate patient/family on patient safety including physical limitations  - Instruct patient to call for assistance with activity   - Consult OT/PT to assist with strengthening/mobility   - Keep Call bell within reach  - Keep bed low and locked with side rails adjusted as appropriate  - Keep care items and personal belongings within reach  - Initiate and maintain comfort rounds  - Apply yellow socks and bracelet for high fall risk patients  - Consider moving patient to room near nurses station  Outcome: Progressing  Goal: Maintain or return to baseline ADL function  Description: INTERVENTIONS:  -  Assess patient's ability to carry out ADLs; assess patient's baseline for ADL function and identify physical deficits which impact ability to perform ADLs (bathing, care of mouth/teeth, toileting, grooming, dressing, etc.)  - Assess/evaluate cause of self-care deficits   - Assess range of motion  - Assess patient's mobility; develop plan if impaired  - Assess patient's need for assistive devices and provide as appropriate  - Encourage maximum independence but intervene and supervise when necessary  - Involve family in performance of ADLs  - Assess for home care needs following discharge   - Consider OT consult to assist with ADL evaluation and planning for discharge  - Provide patient education as appropriate  Outcome: Progressing  Goal: Maintains/Returns to pre admission functional level  Description: INTERVENTIONS:  - Perform BMAT or MOVE assessment daily.   - Set and communicate daily mobility goal to care team and patient/family/caregiver. - Collaborate with rehabilitation services on mobility goals if consulted  - Out of bed for toileting  - Record patient progress and toleration of activity level   Outcome: Progressing     Problem: DISCHARGE PLANNING  Goal: Discharge to home or other facility with appropriate resources  Description: INTERVENTIONS:  - Identify barriers to discharge w/patient and caregiver  - Arrange for needed discharge resources and transportation as appropriate  - Identify discharge learning needs (meds, wound care, etc.)  - Arrange for interpretive services to assist at discharge as needed  - Refer to Case Management Department for coordinating discharge planning if the patient needs post-hospital services based on physician/advanced practitioner order or complex needs related to functional status, cognitive ability, or social support system  Outcome: Progressing     Problem: Knowledge Deficit  Goal: Patient/family/caregiver demonstrates understanding of disease process, treatment plan, medications, and discharge instructions  Description: Complete learning assessment and assess knowledge base.   Interventions:  - Provide teaching at level of understanding  - Provide teaching via preferred learning methods  Outcome: Progressing     Problem: MOBILITY - ADULT  Goal: Maintain or return to baseline ADL function  Description: INTERVENTIONS:  -  Assess patient's ability to carry out ADLs; assess patient's baseline for ADL function and identify physical deficits which impact ability to perform ADLs (bathing, care of mouth/teeth, toileting, grooming, dressing, etc.)  - Assess/evaluate cause of self-care deficits   - Assess range of motion  - Assess patient's mobility; develop plan if impaired  - Assess patient's need for assistive devices and provide as appropriate  - Encourage maximum independence but intervene and supervise when necessary  - Involve family in performance of ADLs  - Assess for home care needs following discharge   - Consider OT consult to assist with ADL evaluation and planning for discharge  - Provide patient education as appropriate  Outcome: Progressing  Goal: Maintains/Returns to pre admission functional level  Description: INTERVENTIONS:  - Perform BMAT or MOVE assessment daily.   - Set and communicate daily mobility goal to care team and patient/family/caregiver.    - Collaborate with rehabilitation services on mobility goals if consulted  - Out of bed for toileting  - Record patient progress and toleration of activity level   Outcome: Progressing     Problem: GENITOURINARY - ADULT  Goal: Maintains or returns to baseline urinary function  Description: INTERVENTIONS:  - Assess urinary function  - Encourage oral fluids to ensure adequate hydration if ordered  - Administer IV fluids as ordered to ensure adequate hydration  - Administer ordered medications as needed  - Offer frequent toileting  - Follow urinary retention protocol if ordered  Outcome: Progressing  Goal: Absence of urinary retention  Description: INTERVENTIONS:  - Assess patient’s ability to void and empty bladder  - Monitor I/O  - Bladder scan as needed  - Discuss with physician/AP medications to alleviate retention as needed  - Discuss catheterization for long term situations as appropriate  Outcome: Progressing

## 2023-08-31 NOTE — ASSESSMENT & PLAN NOTE
· Chronic  · Blood pressure currently controlled  · Continue home amlodipine and metoprolol  · Monitor vital signs closely

## 2023-08-31 NOTE — ASSESSMENT & PLAN NOTE
Lab Results   Component Value Date    HGBA1C 10.3 (H) 10/31/2022       Recent Labs     08/30/23  2112 08/31/23  0009 08/31/23  0535 08/31/23  0718   POCGLU 352* 228* 269* 264*       Blood Sugar Average: Last 72 hrs:  (P) 279.375     • Chronic, uncontrolled, as evidenced by a hemoglobin A1C of 10.3  • Hold home oral anti-diabetics  • Continue patient on insulin sliding scale  • Monitor blood glucose AC/HS  • Hypoglycemia protocol

## 2023-09-01 ENCOUNTER — ANESTHESIA EVENT (INPATIENT)
Dept: PERIOP | Facility: HOSPITAL | Age: 73
DRG: 854 | End: 2023-09-01
Payer: COMMERCIAL

## 2023-09-01 ENCOUNTER — APPOINTMENT (INPATIENT)
Dept: ULTRASOUND IMAGING | Facility: HOSPITAL | Age: 73
DRG: 419 | End: 2023-09-01
Payer: COMMERCIAL

## 2023-09-01 ENCOUNTER — APPOINTMENT (OUTPATIENT)
Dept: NUCLEAR MEDICINE | Facility: HOSPITAL | Age: 73
DRG: 419 | End: 2023-09-01
Payer: COMMERCIAL

## 2023-09-01 PROBLEM — R10.9 ABDOMINAL PAIN: Status: ACTIVE | Noted: 2021-11-17

## 2023-09-01 LAB
ALBUMIN SERPL BCG-MCNC: 3.4 G/DL (ref 3.5–5)
ALP SERPL-CCNC: 61 U/L (ref 34–104)
ALT SERPL W P-5'-P-CCNC: 23 U/L (ref 7–52)
ANION GAP SERPL CALCULATED.3IONS-SCNC: 7 MMOL/L
AST SERPL W P-5'-P-CCNC: 11 U/L (ref 13–39)
BASOPHILS # BLD AUTO: 0.04 THOUSANDS/ÂΜL (ref 0–0.1)
BASOPHILS NFR BLD AUTO: 0 % (ref 0–1)
BILIRUB SERPL-MCNC: 0.54 MG/DL (ref 0.2–1)
BUN SERPL-MCNC: 15 MG/DL (ref 5–25)
CALCIUM ALBUM COR SERPL-MCNC: 9.1 MG/DL (ref 8.3–10.1)
CALCIUM SERPL-MCNC: 8.6 MG/DL (ref 8.4–10.2)
CHLORIDE SERPL-SCNC: 99 MMOL/L (ref 96–108)
CO2 SERPL-SCNC: 25 MMOL/L (ref 21–32)
CREAT SERPL-MCNC: 0.73 MG/DL (ref 0.6–1.3)
EOSINOPHIL # BLD AUTO: 0.08 THOUSAND/ÂΜL (ref 0–0.61)
EOSINOPHIL NFR BLD AUTO: 1 % (ref 0–6)
ERYTHROCYTE [DISTWIDTH] IN BLOOD BY AUTOMATED COUNT: 13.9 % (ref 11.6–15.1)
GFR SERPL CREATININE-BSD FRML MDRD: 80 ML/MIN/1.73SQ M
GLUCOSE SERPL-MCNC: 260 MG/DL (ref 65–140)
GLUCOSE SERPL-MCNC: 279 MG/DL (ref 65–140)
GLUCOSE SERPL-MCNC: 300 MG/DL (ref 65–140)
GLUCOSE SERPL-MCNC: 370 MG/DL (ref 65–140)
GLUCOSE SERPL-MCNC: 402 MG/DL (ref 65–140)
GLUCOSE SERPL-MCNC: 429 MG/DL (ref 65–140)
HCT VFR BLD AUTO: 34.6 % (ref 34.8–46.1)
HGB BLD-MCNC: 11.2 G/DL (ref 11.5–15.4)
IMM GRANULOCYTES # BLD AUTO: 0.07 THOUSAND/UL (ref 0–0.2)
IMM GRANULOCYTES NFR BLD AUTO: 1 % (ref 0–2)
LYMPHOCYTES # BLD AUTO: 2.16 THOUSANDS/ÂΜL (ref 0.6–4.47)
LYMPHOCYTES NFR BLD AUTO: 14 % (ref 14–44)
MCH RBC QN AUTO: 29.8 PG (ref 26.8–34.3)
MCHC RBC AUTO-ENTMCNC: 32.4 G/DL (ref 31.4–37.4)
MCV RBC AUTO: 92 FL (ref 82–98)
MONOCYTES # BLD AUTO: 1.75 THOUSAND/ÂΜL (ref 0.17–1.22)
MONOCYTES NFR BLD AUTO: 12 % (ref 4–12)
NEUTROPHILS # BLD AUTO: 10.98 THOUSANDS/ÂΜL (ref 1.85–7.62)
NEUTS SEG NFR BLD AUTO: 72 % (ref 43–75)
NRBC BLD AUTO-RTO: 0 /100 WBCS
PLATELET # BLD AUTO: 257 THOUSANDS/UL (ref 149–390)
PMV BLD AUTO: 9.7 FL (ref 8.9–12.7)
POTASSIUM SERPL-SCNC: 3.7 MMOL/L (ref 3.5–5.3)
PROT SERPL-MCNC: 6.8 G/DL (ref 6.4–8.4)
RBC # BLD AUTO: 3.76 MILLION/UL (ref 3.81–5.12)
SODIUM SERPL-SCNC: 131 MMOL/L (ref 135–147)
WBC # BLD AUTO: 15.08 THOUSAND/UL (ref 4.31–10.16)

## 2023-09-01 PROCEDURE — 85025 COMPLETE CBC W/AUTO DIFF WBC: CPT | Performed by: INTERNAL MEDICINE

## 2023-09-01 PROCEDURE — 87040 BLOOD CULTURE FOR BACTERIA: CPT | Performed by: INTERNAL MEDICINE

## 2023-09-01 PROCEDURE — A9540 TC99M MAA: HCPCS

## 2023-09-01 PROCEDURE — 76705 ECHO EXAM OF ABDOMEN: CPT

## 2023-09-01 PROCEDURE — A9558 XE133 XENON 10MCI: HCPCS

## 2023-09-01 PROCEDURE — 82948 REAGENT STRIP/BLOOD GLUCOSE: CPT

## 2023-09-01 PROCEDURE — G1004 CDSM NDSC: HCPCS

## 2023-09-01 PROCEDURE — C9113 INJ PANTOPRAZOLE SODIUM, VIA: HCPCS | Performed by: INTERNAL MEDICINE

## 2023-09-01 PROCEDURE — 99223 1ST HOSP IP/OBS HIGH 75: CPT | Performed by: STUDENT IN AN ORGANIZED HEALTH CARE EDUCATION/TRAINING PROGRAM

## 2023-09-01 PROCEDURE — 80053 COMPREHEN METABOLIC PANEL: CPT | Performed by: INTERNAL MEDICINE

## 2023-09-01 PROCEDURE — 78582 LUNG VENTILAT&PERFUS IMAGING: CPT

## 2023-09-01 PROCEDURE — 99233 SBSQ HOSP IP/OBS HIGH 50: CPT | Performed by: INTERNAL MEDICINE

## 2023-09-01 RX ORDER — METRONIDAZOLE 500 MG/100ML
500 INJECTION, SOLUTION INTRAVENOUS EVERY 8 HOURS
Status: COMPLETED | OUTPATIENT
Start: 2023-09-01 | End: 2023-09-06

## 2023-09-01 RX ORDER — INSULIN LISPRO 100 [IU]/ML
1-6 INJECTION, SOLUTION INTRAVENOUS; SUBCUTANEOUS EVERY 6 HOURS SCHEDULED
Status: DISCONTINUED | OUTPATIENT
Start: 2023-09-01 | End: 2023-09-02

## 2023-09-01 RX ORDER — INSULIN LISPRO 100 [IU]/ML
1-6 INJECTION, SOLUTION INTRAVENOUS; SUBCUTANEOUS
Status: DISCONTINUED | OUTPATIENT
Start: 2023-09-01 | End: 2023-09-01

## 2023-09-01 RX ORDER — SODIUM CHLORIDE 9 MG/ML
75 INJECTION, SOLUTION INTRAVENOUS CONTINUOUS
Status: DISCONTINUED | OUTPATIENT
Start: 2023-09-01 | End: 2023-09-02

## 2023-09-01 RX ORDER — CEFTRIAXONE 1 G/50ML
1000 INJECTION, SOLUTION INTRAVENOUS EVERY 24 HOURS
Status: COMPLETED | OUTPATIENT
Start: 2023-09-01 | End: 2023-09-05

## 2023-09-01 RX ORDER — PANTOPRAZOLE SODIUM 40 MG/10ML
40 INJECTION, POWDER, LYOPHILIZED, FOR SOLUTION INTRAVENOUS EVERY 12 HOURS SCHEDULED
Status: DISCONTINUED | OUTPATIENT
Start: 2023-09-01 | End: 2023-09-07 | Stop reason: HOSPADM

## 2023-09-01 RX ORDER — INSULIN LISPRO 100 [IU]/ML
1-5 INJECTION, SOLUTION INTRAVENOUS; SUBCUTANEOUS
Status: DISCONTINUED | OUTPATIENT
Start: 2023-09-01 | End: 2023-09-01

## 2023-09-01 RX ADMIN — METRONIDAZOLE 500 MG: 500 INJECTION, SOLUTION INTRAVENOUS at 21:24

## 2023-09-01 RX ADMIN — INSULIN LISPRO 6 UNITS: 100 INJECTION, SOLUTION INTRAVENOUS; SUBCUTANEOUS at 14:41

## 2023-09-01 RX ADMIN — INSULIN LISPRO 3 UNITS: 100 INJECTION, SOLUTION INTRAVENOUS; SUBCUTANEOUS at 05:36

## 2023-09-01 RX ADMIN — INSULIN LISPRO 6 UNITS: 100 INJECTION, SOLUTION INTRAVENOUS; SUBCUTANEOUS at 12:22

## 2023-09-01 RX ADMIN — PANTOPRAZOLE SODIUM 40 MG: 40 INJECTION, POWDER, FOR SOLUTION INTRAVENOUS at 12:22

## 2023-09-01 RX ADMIN — INSULIN LISPRO 6 UNITS: 100 INJECTION, SOLUTION INTRAVENOUS; SUBCUTANEOUS at 11:16

## 2023-09-01 RX ADMIN — METRONIDAZOLE 500 MG: 500 INJECTION, SOLUTION INTRAVENOUS at 15:39

## 2023-09-01 RX ADMIN — INSULIN LISPRO 4 UNITS: 100 INJECTION, SOLUTION INTRAVENOUS; SUBCUTANEOUS at 11:16

## 2023-09-01 RX ADMIN — AMLODIPINE BESYLATE 5 MG: 5 TABLET ORAL at 21:24

## 2023-09-01 RX ADMIN — INSULIN LISPRO 4 UNITS: 100 INJECTION, SOLUTION INTRAVENOUS; SUBCUTANEOUS at 09:07

## 2023-09-01 RX ADMIN — PANTOPRAZOLE SODIUM 40 MG: 40 INJECTION, POWDER, FOR SOLUTION INTRAVENOUS at 21:24

## 2023-09-01 RX ADMIN — HEPARIN SODIUM 5000 UNITS: 5000 INJECTION INTRAVENOUS; SUBCUTANEOUS at 05:36

## 2023-09-01 RX ADMIN — ASPIRIN 81 MG: 81 TABLET, COATED ORAL at 09:07

## 2023-09-01 RX ADMIN — SODIUM CHLORIDE 75 ML/HR: 0.9 INJECTION, SOLUTION INTRAVENOUS at 14:41

## 2023-09-01 RX ADMIN — INSULIN LISPRO 3 UNITS: 100 INJECTION, SOLUTION INTRAVENOUS; SUBCUTANEOUS at 17:55

## 2023-09-01 RX ADMIN — Medication 2.5 MG: at 14:40

## 2023-09-01 RX ADMIN — HEPARIN SODIUM 5000 UNITS: 5000 INJECTION INTRAVENOUS; SUBCUTANEOUS at 14:40

## 2023-09-01 RX ADMIN — TEMAZEPAM 30 MG: 15 CAPSULE ORAL at 23:17

## 2023-09-01 RX ADMIN — OXYCODONE HYDROCHLORIDE 5 MG: 5 TABLET ORAL at 21:24

## 2023-09-01 RX ADMIN — CEFTRIAXONE 1000 MG: 1 INJECTION, SOLUTION INTRAVENOUS at 14:41

## 2023-09-01 RX ADMIN — HEPARIN SODIUM 5000 UNITS: 5000 INJECTION INTRAVENOUS; SUBCUTANEOUS at 21:24

## 2023-09-01 RX ADMIN — Medication 2.5 MG: at 09:07

## 2023-09-01 NOTE — PROGRESS NOTES
[address]  Progress Note  Name: Caryn Dupree I  MRN: [id number]  Unit/Bed#: -73 I Date of Admission: 8/30/2023   Date of Service: 9/1/2023 I Hospital Day: 1    Assessment/Plan   * Abdominal pain  Assessment & Plan  · Patient with right upper quadrant abdominal pain  · Given concern for acute cholecystitis patient had right upper quadrant ultrasound done which is positive for acute cholecystitis  · General surgery made aware and patient n.p.o. at this time  · Follow-up blood cultures x2  · Ceftriaxone and Flagyl  · Continue to monitor    Elevated d-dimer  Assessment & Plan  · Present on admission, evidenced by a d-dimer of 1.42  · Elevated D-dimer may be secondary to underlying infection  · Unable to have a CTA Chest in setting of allergy to Iodine  · V/Q scan pending  · Lower extremity Doppler negative for any DVT    Hypertension  Assessment & Plan  · Chronic  · Blood pressure currently controlled  · Continue home amlodipine and metoprolol  · Monitor vital signs closely    Diabetes [facility])  Assessment & Plan  Lab Results   Component Value Date    HGBA1C 8.8 (H) 08/31/2023       Recent Labs     09/01/23  0452 09/01/23  0718 09/01/23  1102 09/01/23  1217   POCGLU 300* 279* 429* 370*       Blood Sugar Average: Last 72 hrs:  (P) [id number]     • Chronic, uncontrolled, as evidenced by a hemoglobin A1C of 10.3  • Hold home oral anti-diabetics  • Every 6 hours blood sugar checks and insulin given n.p.o.  • Hypoglycemia protocol         VTE Pharmacologic Prophylaxis: VTE Score: 6 Moderate Risk (Score 3-4) - Pharmacological DVT Prophylaxis Ordered: heparin. Patient Centered Rounds: I performed bedside rounds with nursing staff today. Discussions with Specialists or Other Care Team Provider: General surgery, case management    Education and Discussions with Family / Patient: Updated  (son) via phone.     Total Time Spent on Date of Encounter in care of patient: 45 minutes This time was spent on one or more of the following: performing physical exam; counseling and coordination of care; obtaining or reviewing history; documenting in the medical record; reviewing/ordering tests, medications or procedures; communicating with other healthcare professionals and discussing with patient's family/caregivers. Current Length of Stay: 1 day(s)  Current Patient Status: Inpatient   Certification Statement: The patient will continue to require additional inpatient hospital stay due to Acute cholecystitis  Discharge Plan: Anticipate discharge in 48 hrs to home. Code Status: Level 1 - Full Code    Subjective:   Patient resting comfortably on examination. Patient still complaining of right upper quadrant abdominal pain which has worsened over the past 24 hours. Patient without any other complaints on exam.    Objective:     Vitals:   Temp (24hrs), Av.2 °F (37.3 °C), Min:98 °F (36.7 °C), Max:100.8 °F (38.2 °C)    Temp:  [98 °F (36.7 °C)-100.8 °F (38.2 °C)] 99.5 °F (37.5 °C)  HR:  [71-84] 71  Resp:  [17] 17  BP: (108-143)/(55-77) 123/55  SpO2:  [94 %-96 %] 94 %  There is no height or weight on file to calculate BMI. Input and Output Summary (last 24 hours): Intake/Output Summary (Last 24 hours) at 2023 1354  Last data filed at 2023 0924  Gross per 24 hour   Intake 240 ml   Output 587 ml   Net -347 ml       Physical Exam:   Physical Exam  Vitals and nursing note reviewed. Constitutional:       General: She is not in acute distress. Appearance: She is well-developed. HENT:      Head: Normocephalic and atraumatic. Eyes:      General: No scleral icterus. Conjunctiva/sclera: Conjunctivae normal.   Cardiovascular:      Rate and Rhythm: Normal rate and regular rhythm. Heart sounds: Normal heart sounds. No murmur heard. No friction rub. No gallop. Pulmonary:      Effort: Pulmonary effort is normal. No respiratory distress.       Breath sounds: Normal breath sounds. No wheezing or rales. Abdominal:      General: Bowel sounds are normal. There is no distension. Palpations: Abdomen is soft. Tenderness: There is no abdominal tenderness. Comments: Right upper quadrant abdominal pain   Musculoskeletal:         General: Normal range of motion. Skin:     General: Skin is warm. Findings: No rash. Neurological:      Mental Status: She is alert and oriented to person, place, and time.           Additional Data:     Labs:  Results from last 7 days   Lab Units 09/01/23  0452   WBC Thousand/uL 15.08*   HEMOGLOBIN g/dL 11.2*   HEMATOCRIT % 34.6*   PLATELETS Thousands/uL 257   NEUTROS PCT % 72   LYMPHS PCT % 14   MONOS PCT % 12   EOS PCT % 1     Results from last 7 days   Lab Units 09/01/23  1138   SODIUM mmol/L 131*   POTASSIUM mmol/L 3.7   CHLORIDE mmol/L 99   CO2 mmol/L 25   BUN mg/dL 15   CREATININE mg/dL 0.73   ANION GAP mmol/L 7   CALCIUM mg/dL 8.6   ALBUMIN g/dL 3.4*   TOTAL BILIRUBIN mg/dL 0.54   ALK PHOS U/L 61   ALT U/L 23   AST U/L 11*   GLUCOSE RANDOM mg/dL 402*         Results from last 7 days   Lab Units 09/01/23  1217 09/01/23  1102 09/01/23  0718 09/01/23  0452 08/31/23  2345 08/31/23  2108 08/31/23  1654 08/31/23  1520 08/31/23  1106 08/31/23  1033 08/31/23  0718 08/31/23  0535   POC GLUCOSE mg/dl 370* 429* 279* 300* 352* 342* 340* 377* 389* 447* 264* 269*     Results from last 7 days   Lab Units 08/31/23  0540   HEMOGLOBIN A1C % 8.8*     Results from last 7 days   Lab Units 08/30/23  1000   LACTIC ACID mmol/L 1.5       Lines/Drains:  Invasive Devices     Peripheral Intravenous Line  Duration           Peripheral IV 08/30/23 Left Antecubital 2 days                      Imaging: Reviewed radiology reports from this admission including: ultrasound(s)    Recent Cultures (last 7 days):         Last 24 Hours Medication List:   Current Facility-Administered Medications   Medication Dose Route Frequency Provider Last Rate   • acetaminophen  650 mg Oral Q6H PRN Abelino Sandhu MD     • amLODIPine  5 mg Oral HS Abelino Sandhu MD     • aspirin  81 mg Oral Daily Abelino Sandhu MD     • cefTRIAXone  1,000 mg Intravenous Q24H uV Alfaro DO     • heparin (porcine)  5,000 Units Subcutaneous Q8H [address] Abelino Sandhu MD     • hydrALAZINE  5 mg Intravenous Q6H PRN NISHI Hathaway     • insulin lispro  1-6 Units Subcutaneous Q6H [address] Vu Alfaro DO     • metroNIDAZOLE  500 mg Intravenous Q8H Vu Alfaro, DO     • oxyCODONE  5 mg Oral Q4H PRN Abelino Sandhu MD     • oxyCODONE  2.5 mg Oral Q4H PRN Abelino Sandhu MD     • pantoprazole  40 mg Intravenous Q12H [address] Vu Alfaro, DO     • sodium chloride  75 mL/hr Intravenous Continuous Vu Alfaro DO     • temazepam  30 mg Oral HS PRN Luda Pollard PA-C          Today, Patient Was Seen By: Vu Alfaro DO    **Please Note: This note may have been constructed using a voice recognition system. **

## 2023-09-01 NOTE — ASSESSMENT & PLAN NOTE
Lab Results   Component Value Date    HGBA1C 8.8 (H) 08/31/2023       Recent Labs     09/01/23  0452 09/01/23  0718 09/01/23  1102 09/01/23  1217   POCGLU 300* 279* 429* 370*       Blood Sugar Average: Last 72 hrs:  (P) [id number]     • Chronic, uncontrolled, as evidenced by a hemoglobin A1C of 10.3  • Hold home oral anti-diabetics  • Every 6 hours blood sugar checks and insulin given n.p.o.  • Hypoglycemia protocol

## 2023-09-01 NOTE — NURSING NOTE
Patient ambulated to bathroom and voided.  Post void bladder scan 120 ml    Will continue to monitor

## 2023-09-01 NOTE — UTILIZATION REVIEW
NOTIFICATION OF INPATIENT ADMISSION   AUTHORIZATION REQUEST   SERVICING FACILITY:   [address] [place] [address] [address]  Tax ID: [id number]  NPI: [id number] ATTENDING PROVIDER:  Attending Name and NPI#: Filiberto Rosales [[id number]]  Address: Reddy Alonso[address]  Phone: [phone]     ADMISSION INFORMATION:  [facility]   Place of Service Code: 21  Inpatient Admission Date/Time: 8/31/23  3:14 PM  Discharge Date/Time: No discharge date for patient encounter. Admitting Diagnosis Code/Description:  Chest pain [R07.9]  Abdominal pain [R10.9]     UTILIZATION REVIEW CONTACT:  [employer], Utilization   Network Utilization Review Department  Phone: [phone]  Fax [phone]  Email: [email or web address]  Contact for approvals/pending authorizations, clinical reviews, and discharge. PHYSICIAN ADVISORY SERVICES:  Medical Necessity Denial & [facility] Review  Phone: [phone]  Fax: [phone]  Email: [email or web address]

## 2023-09-01 NOTE — ASSESSMENT & PLAN NOTE
· Present on admission, evidenced by a d-dimer of 1.42  · Elevated D-dimer may be secondary to underlying infection  · Unable to have a CTA Chest in setting of allergy to Iodine  · V/Q scan pending  · Lower extremity Doppler negative for any DVT

## 2023-09-01 NOTE — CONSULTS
GENERAL SURGERY CONSULT                                                                                                                                               Patsy Bautista 74 y.o. female MRN:                                                                     [id number]  Unit/Bed#: -01                                                         Encounter: [id number]                                                        Assessment/Plan   Abdominal Pain RUQ  Acute Cholecystitis demonstrated on US with cholelithiasis, sludge, wall thickening, and +Rivera's sign, small amount of pericholecystic fluid. Increasing Leukocytosis to 15 , 12 on admission. Tmax last evening 100.8 currently 99.5    H/o chest pain on admission. Troponin negative   D Dimer elevated, pt is scheduled for VQ scan due to iodine allergy. HTN  DM uncontrolled. Iodine anaphylaxis     - VQ scan low suspicion. -plan for Laparoscopic Cholecystectomy this admission  -cont antibiotics, these have initiated today, Rocephin and Flagyl   -NPO  -cont IVF  -cont pain control  -will discuss with patient and family plan and timing for OR. CHIEF COMPLAINT:  Per pt's son via pt's phone, she had pain in her abdomen. Interpretation per PT's son Rashard Sarabia. HPI: Patsy Bautista is a 74y.o. year oldfemale,PMH: HTN. DM uncontrolled Hbg A1c >10  Iodine allergy, anaphylaxis     consulted for US RUQ, demonstrated acute cholecystis, cholelithiasis, sludge, wall thickening and svetlana cholecystic fluid. The patient was admitted with complaints of chest pain, troponin x 2 were negative, CT A/P was negative, it did demonstrate cholelithiasis but not suspicious for acute cholecystitis. A d-Dimer was ordered and returned elevated. VQ scan was ordered resulting in low suspicion of PE. She has an anaphylactic reaction to iodine. Her white count on admission was 12 and today it is 15, temp 99.5. Pt looks ill lying on bed. Surgical history of 2 c-sections. Low midline incisions   On exam pt is tender in RUQ, he has pain with inspiration. No masses, organomegaly. Imaging Studies: US right upper quadrant    Result Date: 9/1/2023  Impression: Abnormal gallbladder, with calculi, sludge, gallbladder wall thickening, positive Rivera sign, and small amount of pericholecystic fluid. Findings highly suspicious for acute cholecystitis in the appropriate clinical setting The examination demonstrates a significant  finding and was documented as such in [facility] for liaison and referring practitioner immediate notification. Workstation performed: [id number]     CT recon only thoracolumbar    Result Date: 8/30/2023  Impression: No acute osseous abnormality of thoracic or lumbar spine. Chronic appearing L2 superior endplate compression fracture deformity with mild height loss. Chronic appearing L3 superior endplate compression fracture deformity with minimal height loss. Suspect severe canal stenosis at L3-L4. Degenerative changes, as detailed above. Please see same-day CT cervical spine and CT chest abdomen pelvis without contrast for further evaluation. The study was marked in [facility] for immediate notification. Workstation performed: [id number]     CT chest abdomen pelvis wo contrast    Result Date: 8/30/2023  Impression: No acute findings in the chest, abdomen or the pelvis. Workstation performed: [id number]     CT spine cervical without contrast    Result Date: 8/30/2023  Impression: No cervical spine fracture or traumatic malalignment. Please see same-day CT chest abdomen pelvis and CT recon only thoracolumbar no charge for further evaluation. Additional chronic/incidental findings as detailed above. Workstation performed: [id number]     XR chest 1 view portable    Result Date: 8/30/2023  Impression: No acute cardiopulmonary disease.  Findings are stable Workstation performed: [id number]     Lab Results:   Lab Results   Component Value Date    WBC 15.08 (H) 09/01/2023    HGB 11.2 (L) 09/01/2023    HCT 34.6 (L) 09/01/2023     09/01/2023     Lab Results   Component Value Date    K 3.7 09/01/2023    CL 99 09/01/2023    CO2 25 09/01/2023    BUN 15 09/01/2023    CREATININE 0.73 09/01/2023     Lab Results   Component Value Date    AST 11 (L) 09/01/2023    ALT 23 09/01/2023    ALKPHOS 61 09/01/2023    TBILI 0.54 09/01/2023    ALB 3.4 (L) 09/01/2023       Inpatient consult to Acute Care Surgery  Consult performed by: Jamaal Vance PA-C  Consult ordered by: Lamont Maxwell DO          Historical Information   Past Medical History:   Diagnosis Date   • Diabetes mellitus ([address])    • Hypertension      History reviewed. No pertinent surgical history.   Social History   Social History     Substance and Sexual Activity   Alcohol Use Never     Social History     Substance and Sexual Activity   Drug Use Not on file     Social History     Tobacco Use   Smoking Status Never   Smokeless Tobacco Never     Family History: non-contributory    Allergies   Allergen Reactions   • Cortisone Chest Pain   • Dye [Iodinated Contrast Media] Chest Pain   • Trulicity [Dulaglutide] Bradycardia       Meds/Allergies   current meds:   Current Facility-Administered Medications   Medication Dose Route Frequency   • acetaminophen (TYLENOL) tablet 650 mg  650 mg Oral Q6H PRN   • amLODIPine (NORVASC) tablet 5 mg  5 mg Oral HS   • aspirin (ECOTRIN LOW STRENGTH) EC tablet 81 mg  81 mg Oral Daily   • cefTRIAXone (ROCEPHIN) IVPB (premix in dextrose) 1,000 mg 50 mL  1,000 mg Intravenous Q24H   • heparin (porcine) subcutaneous injection 5,000 Units  5,000 Units Subcutaneous Q8H [address]   • hydrALAZINE (APRESOLINE) injection 5 mg  5 mg Intravenous Q6H PRN   • insulin lispro (HumaLOG) 100 units/mL subcutaneous injection 1-5 Units  1-5 Units Subcutaneous HS   • insulin lispro (HumaLOG) 100 units/mL subcutaneous injection 1-6 Units  1-6 Units Subcutaneous TID AC   • metroNIDAZOLE (FLAGYL) IVPB (premix) 500 mg 100 mL  500 mg Intravenous Q8H   • oxyCODONE (ROXICODONE) IR tablet 5 mg  5 mg Oral Q4H PRN   • oxyCODONE (ROXICODONE) split tablet 2.5 mg  2.5 mg Oral Q4H PRN   • pantoprazole (PROTONIX) injection 40 mg  40 mg Intravenous Q12H [address]   • sodium chloride 0.9 % infusion  75 mL/hr Intravenous Continuous   • temazepam (RESTORIL) capsule 30 mg  30 mg Oral HS PRN              Objective   Vitals:    Intake/Output Summary (Last 24 hours) at 9/1/2023 1300  Last data filed at 9/1/2023 0033  Gross per 24 hour   Intake 240 ml   Output 907 ml   Net -667 ml     Invasive Devices     Peripheral Intravenous Line  Duration           Peripheral IV 08/30/23 Left Antecubital 2 days                Review of Systems  12 point ROS reviewed and Negative except[de-identified]   What is noted in the HPI    Physical Exam    Blood pressure 123/55, pulse 71, temperature 99.5 °F (37.5 °C), resp. rate 17, SpO2 94 %. GEN: A & O x 3, cooperative. Pt looks ill lying in bed. HEENT: PERRLA EOMI, sclera anicterus  NECK: supple   LUNGS: clear throughout  COR: RRR no murmur    ABD: soft, tenderness in RQU, +Rivera's sign. No masses, organomegaly  Hypoactive bowel sounds. EXTREM: FROM no joint deformities.   No pedal edema   SKIN:no jaundice   NEURO: CN II -XII intact, no tremor, affect appropriate            Link ILSA Vance  9/1/2023

## 2023-09-01 NOTE — ANESTHESIA PREPROCEDURE EVALUATION
Procedure:  CHOLECYSTECTOMY LAPAROSCOPIC, possible open (Abdomen)    ECG: NSR with LAD and anterior infarct  Hx of anaphylaxis     Abdominal pain and RUQ US  HTN - amlodipine and metoprolol  DM - A1c 8.8  Ddimer elevation negative V/Q scan    Denies the following: CP/SOB with exertion, asthma, COPD, MORRIS, stroke/TIA, seizure      Relevant Problems   CARDIO   (+) Hypertension        Physical Exam    Airway    Mallampati score: II  TM Distance: >3 FB  Neck ROM: full     Dental   lower dentures and upper dentures,     Cardiovascular      Pulmonary      Other Findings        Anesthesia Plan  ASA Score- 3     Anesthesia Type- general with ASA Monitors. Additional Monitors:   Airway Plan: ETT. Plan Factors-Exercise tolerance (METS): >4 METS. Chart reviewed. EKG reviewed. Existing labs reviewed. Patient summary reviewed. Patient is not a current smoker. Obstructive sleep apnea risk education given perioperatively. Induction- intravenous. Postoperative Plan-     Informed Consent- Anesthetic plan and risks discussed with patient. I personally reviewed this patient with the CRNA. Discussed and agreed on the Anesthesia Plan with the CRNA. Mendel Pichardo

## 2023-09-01 NOTE — ASSESSMENT & PLAN NOTE
· Patient with right upper quadrant abdominal pain  · Given concern for acute cholecystitis patient had right upper quadrant ultrasound done which is positive for acute cholecystitis  · General surgery made aware and patient n.p.o. at this time  · Follow-up blood cultures x2  · Ceftriaxone and Flagyl  · Continue to monitor

## 2023-09-01 NOTE — PLAN OF CARE
Problem: GASTROINTESTINAL - ADULT  Goal: Minimal or absence of nausea and/or vomiting  Description: INTERVENTIONS:  - Administer IV fluids if ordered to ensure adequate hydration  - Maintain NPO status until nausea and vomiting are resolved  - Nasogastric tube if ordered  - Administer ordered antiemetic medications as needed  - Provide nonpharmacologic comfort measures as appropriate  - Advance diet as tolerated, if ordered  - Consider nutrition services referral to assist patient with adequate nutrition and appropriate food choices  Outcome: Progressing  Goal: Maintains or returns to baseline bowel function  Description: INTERVENTIONS:  - Assess bowel function  - Encourage oral fluids to ensure adequate hydration  - Administer IV fluids if ordered to ensure adequate hydration  - Administer ordered medications as needed  - Encourage mobilization and activity  - Consider nutritional services referral to assist patient with adequate nutrition and appropriate food choices  Outcome: Progressing  Goal: Maintains adequate nutritional intake  Description: INTERVENTIONS:  - Monitor percentage of each meal consumed  - Identify factors contributing to decreased intake, treat as appropriate  - Assist with meals as needed  - Monitor I&O, weight, and lab values if indicated  - Obtain nutrition services referral as needed  Outcome: Progressing     Problem: Nutrition/Hydration-ADULT  Goal: Nutrient/Hydration intake appropriate for improving, restoring or maintaining nutritional needs  Description: Monitor and assess patient's nutrition/hydration status for malnutrition. Collaborate with interdisciplinary team and initiate plan and interventions as ordered. Monitor patient's weight and dietary intake as ordered or per policy. Utilize nutrition screening tool and intervene as necessary. Determine patient's food preferences and provide high-protein, high-caloric foods as appropriate.      INTERVENTIONS:  - Monitor oral intake, urinary output, labs, and treatment plans  - Assess nutrition and hydration status and recommend course of action  - Evaluate amount of meals eaten  - Assist patient with eating if necessary   - Allow adequate time for meals  - Recommend/ encourage appropriate diets, oral nutritional supplements, and vitamin/mineral supplements  - Order, calculate, and assess calorie counts as needed  - Recommend, monitor, and adjust tube feedings and TPN/PPN based on assessed needs  - Assess need for intravenous fluids  - Provide specific nutrition/hydration education as appropriate  - Include patient/family/caregiver in decisions related to nutrition  Outcome: Progressing     Problem: METABOLIC, FLUID AND ELECTROLYTES - ADULT  Goal: Glucose maintained within target range  Description: INTERVENTIONS:  - Monitor Blood Glucose as ordered  - Assess for signs and symptoms of hyperglycemia and hypoglycemia  - Administer ordered medications to maintain glucose within target range  - Assess nutritional intake and initiate nutrition service referral as needed  Outcome: Progressing     Problem: PAIN - ADULT  Goal: Verbalizes/displays adequate comfort level or baseline comfort level  Description: Interventions:  - Encourage patient to monitor pain and request assistance  - Assess pain using appropriate pain scale  - Administer analgesics based on type and severity of pain and evaluate response  - Implement non-pharmacological measures as appropriate and evaluate response  - Consider cultural and social influences on pain and pain management  - Notify physician/advanced practitioner if interventions unsuccessful or patient reports new pain  Outcome: Progressing     Problem: INFECTION - ADULT  Goal: Absence or prevention of progression during hospitalization  Description: INTERVENTIONS:  - Assess and monitor for signs and symptoms of infection  - Monitor lab/diagnostic results  - Monitor all insertion sites, i.e. indwelling lines, tubes, and drains  - Monitor endotracheal if appropriate and nasal secretions for changes in amount and color  - Bryan appropriate cooling/warming therapies per order  - Administer medications as ordered  - Instruct and encourage patient and family to use good hand hygiene technique  - Identify and instruct in appropriate isolation precautions for identified infection/condition  Outcome: Progressing  Goal: Absence of fever/infection during neutropenic period  Description: INTERVENTIONS:  - Monitor WBC    Outcome: Progressing     Problem: SAFETY ADULT  Goal: Patient will remain free of falls  Description: INTERVENTIONS:  - Educate patient/family on patient safety including physical limitations  - Instruct patient to call for assistance with activity   - Consult OT/PT to assist with strengthening/mobility   - Keep Call bell within reach  - Keep bed low and locked with side rails adjusted as appropriate  - Keep care items and personal belongings within reach  - Initiate and maintain comfort rounds  - Make Fall Risk Sign visible to staff  - Apply yellow socks and bracelet for high fall risk patients  - Consider moving patient to room near nurses station  Outcome: Progressing  Goal: Maintain or return to baseline ADL function  Description: INTERVENTIONS:  -  Assess patient's ability to carry out ADLs; assess patient's baseline for ADL function and identify physical deficits which impact ability to perform ADLs (bathing, care of mouth/teeth, toileting, grooming, dressing, etc.)  - Assess/evaluate cause of self-care deficits   - Assess range of motion  - Assess patient's mobility; develop plan if impaired  - Assess patient's need for assistive devices and provide as appropriate  - Encourage maximum independence but intervene and supervise when necessary  - Involve family in performance of ADLs  - Assess for home care needs following discharge   - Consider OT consult to assist with ADL evaluation and planning for discharge  - Provide patient education as appropriate  Outcome: Progressing  Goal: Maintains/Returns to pre admission functional level  Description: INTERVENTIONS:  - Perform BMAT or MOVE assessment daily.   - Set and communicate daily mobility goal to care team and patient/family/caregiver. - Out of bed for toileting  - Record patient progress and toleration of activity level   Outcome: Progressing     Problem: DISCHARGE PLANNING  Goal: Discharge to home or other facility with appropriate resources  Description: INTERVENTIONS:  - Identify barriers to discharge w/patient and caregiver  - Arrange for needed discharge resources and transportation as appropriate  - Identify discharge learning needs (meds, wound care, etc.)  - Arrange for interpretive services to assist at discharge as needed  - Refer to Case Management Department for coordinating discharge planning if the patient needs post-hospital services based on physician/advanced practitioner order or complex needs related to functional status, cognitive ability, or social support system  Outcome: Progressing     Problem: Knowledge Deficit  Goal: Patient/family/caregiver demonstrates understanding of disease process, treatment plan, medications, and discharge instructions  Description: Complete learning assessment and assess knowledge base.   Interventions:  - Provide teaching at level of understanding  - Provide teaching via preferred learning methods  Outcome: Progressing     Problem: MOBILITY - ADULT  Goal: Maintain or return to baseline ADL function  Description: INTERVENTIONS:  -  Assess patient's ability to carry out ADLs; assess patient's baseline for ADL function and identify physical deficits which impact ability to perform ADLs (bathing, care of mouth/teeth, toileting, grooming, dressing, etc.)  - Assess/evaluate cause of self-care deficits   - Assess range of motion  - Assess patient's mobility; develop plan if impaired  - Assess patient's need for assistive devices and provide as appropriate  - Encourage maximum independence but intervene and supervise when necessary  - Involve family in performance of ADLs  - Assess for home care needs following discharge   - Consider OT consult to assist with ADL evaluation and planning for discharge  - Provide patient education as appropriate  Outcome: Progressing  Goal: Maintains/Returns to pre admission functional level  Description: INTERVENTIONS:  - Perform BMAT or MOVE assessment daily.   - Set and communicate daily mobility goal to care team and patient/family/caregiver.    - Collaborate with rehabilitation services on mobility goals if consulted  - Out of bed for toileting  - Record patient progress and toleration of activity level   Outcome: Progressing     Problem: GENITOURINARY - ADULT  Goal: Maintains or returns to baseline urinary function  Description: INTERVENTIONS:  - Assess urinary function  - Encourage oral fluids to ensure adequate hydration if ordered  - Administer IV fluids as ordered to ensure adequate hydration  - Administer ordered medications as needed  - Offer frequent toileting  - Follow urinary retention protocol if ordered  Outcome: Progressing  Goal: Absence of urinary retention  Description: INTERVENTIONS:  - Assess patient’s ability to void and empty bladder  - Monitor I/O  - Bladder scan as needed  - Discuss with physician/AP medications to alleviate retention as needed  - Discuss catheterization for long term situations as appropriate  Outcome: Progressing

## 2023-09-01 NOTE — PROGRESS NOTES
2100: C/O "feeling full" after trying to eat or drink water. Bladder scan obtained for 603ml. Patient then ambulated to bathroom with assist of nursing and voided. PVR is 347. RN in contact with house officer. New order obtained for straight cath. Family at bedside and aware of plan, however, patient refusing straight cath at this time. States "It hurts too much." Education provided to patient and family. Patient would like to wait and attempt to void again before straight cath. House officer aware of same. Verbal order received to place syed catheter if PVR is > 400ml or unable to void again. 1100: Patient voided in bathroom. PVR 163ml at this time.

## 2023-09-02 ENCOUNTER — ANESTHESIA (INPATIENT)
Dept: PERIOP | Facility: HOSPITAL | Age: 73
DRG: 854 | End: 2023-09-02
Payer: COMMERCIAL

## 2023-09-02 PROBLEM — K81.0 ACUTE CHOLECYSTITIS: Status: ACTIVE | Noted: 2021-11-17

## 2023-09-02 LAB
ALBUMIN SERPL BCG-MCNC: 3.1 G/DL (ref 3.5–5)
ALP SERPL-CCNC: 56 U/L (ref 34–104)
ALT SERPL W P-5'-P-CCNC: 19 U/L (ref 7–52)
ANION GAP SERPL CALCULATED.3IONS-SCNC: 6 MMOL/L
ANION GAP SERPL CALCULATED.3IONS-SCNC: 8 MMOL/L
AST SERPL W P-5'-P-CCNC: 11 U/L (ref 13–39)
BASE EX.OXY STD BLDV CALC-SCNC: 87.7 % (ref 60–80)
BASE EXCESS BLDV CALC-SCNC: -3.3 MMOL/L
BASOPHILS # BLD AUTO: 0.03 THOUSANDS/ÂΜL (ref 0–0.1)
BASOPHILS NFR BLD AUTO: 0 % (ref 0–1)
BETA-HYDROXYBUTYRATE: 0.3 MMOL/L
BILIRUB SERPL-MCNC: 0.64 MG/DL (ref 0.2–1)
BUN SERPL-MCNC: 14 MG/DL (ref 5–25)
BUN SERPL-MCNC: 21 MG/DL (ref 5–25)
CALCIUM ALBUM COR SERPL-MCNC: 8.5 MG/DL (ref 8.3–10.1)
CALCIUM SERPL-MCNC: 7.8 MG/DL (ref 8.4–10.2)
CALCIUM SERPL-MCNC: 8.4 MG/DL (ref 8.4–10.2)
CHLORIDE SERPL-SCNC: 102 MMOL/L (ref 96–108)
CHLORIDE SERPL-SCNC: 102 MMOL/L (ref 96–108)
CO2 SERPL-SCNC: 22 MMOL/L (ref 21–32)
CO2 SERPL-SCNC: 24 MMOL/L (ref 21–32)
CREAT SERPL-MCNC: 0.65 MG/DL (ref 0.6–1.3)
CREAT SERPL-MCNC: 0.91 MG/DL (ref 0.6–1.3)
EOSINOPHIL # BLD AUTO: 0.08 THOUSAND/ÂΜL (ref 0–0.61)
EOSINOPHIL NFR BLD AUTO: 1 % (ref 0–6)
ERYTHROCYTE [DISTWIDTH] IN BLOOD BY AUTOMATED COUNT: 13.7 % (ref 11.6–15.1)
GFR SERPL CREATININE-BSD FRML MDRD: 61 ML/MIN/1.73SQ M
GFR SERPL CREATININE-BSD FRML MDRD: 87 ML/MIN/1.73SQ M
GLUCOSE SERPL-MCNC: 266 MG/DL (ref 65–140)
GLUCOSE SERPL-MCNC: 280 MG/DL (ref 65–140)
GLUCOSE SERPL-MCNC: 291 MG/DL (ref 65–140)
GLUCOSE SERPL-MCNC: 305 MG/DL (ref 65–140)
GLUCOSE SERPL-MCNC: 407 MG/DL (ref 65–140)
GLUCOSE SERPL-MCNC: 564 MG/DL (ref 65–140)
GLUCOSE SERPL-MCNC: >500 MG/DL (ref 65–140)
HCO3 BLDV-SCNC: 21.2 MMOL/L (ref 24–30)
HCT VFR BLD AUTO: 32.5 % (ref 34.8–46.1)
HGB BLD-MCNC: 10.6 G/DL (ref 11.5–15.4)
IMM GRANULOCYTES # BLD AUTO: 0.08 THOUSAND/UL (ref 0–0.2)
IMM GRANULOCYTES NFR BLD AUTO: 1 % (ref 0–2)
LYMPHOCYTES # BLD AUTO: 1.63 THOUSANDS/ÂΜL (ref 0.6–4.47)
LYMPHOCYTES NFR BLD AUTO: 13 % (ref 14–44)
MCH RBC QN AUTO: 30.5 PG (ref 26.8–34.3)
MCHC RBC AUTO-ENTMCNC: 32.6 G/DL (ref 31.4–37.4)
MCV RBC AUTO: 93 FL (ref 82–98)
MONOCYTES # BLD AUTO: 1.39 THOUSAND/ÂΜL (ref 0.17–1.22)
MONOCYTES NFR BLD AUTO: 11 % (ref 4–12)
NEUTROPHILS # BLD AUTO: 9.47 THOUSANDS/ÂΜL (ref 1.85–7.62)
NEUTS SEG NFR BLD AUTO: 74 % (ref 43–75)
NRBC BLD AUTO-RTO: 0 /100 WBCS
O2 CT BLDV-SCNC: 14.6 ML/DL
PCO2 BLDV: 36.3 MM HG (ref 42–50)
PH BLDV: 7.38 [PH] (ref 7.3–7.4)
PLATELET # BLD AUTO: 229 THOUSANDS/UL (ref 149–390)
PMV BLD AUTO: 10.5 FL (ref 8.9–12.7)
PO2 BLDV: 57.2 MM HG (ref 35–45)
POTASSIUM SERPL-SCNC: 3.9 MMOL/L (ref 3.5–5.3)
POTASSIUM SERPL-SCNC: 4.9 MMOL/L (ref 3.5–5.3)
PROT SERPL-MCNC: 6.3 G/DL (ref 6.4–8.4)
RBC # BLD AUTO: 3.48 MILLION/UL (ref 3.81–5.12)
SODIUM SERPL-SCNC: 132 MMOL/L (ref 135–147)
SODIUM SERPL-SCNC: 132 MMOL/L (ref 135–147)
WBC # BLD AUTO: 12.68 THOUSAND/UL (ref 4.31–10.16)

## 2023-09-02 PROCEDURE — 80053 COMPREHEN METABOLIC PANEL: CPT | Performed by: INTERNAL MEDICINE

## 2023-09-02 PROCEDURE — 88342 IMHCHEM/IMCYTCHM 1ST ANTB: CPT | Performed by: PATHOLOGY

## 2023-09-02 PROCEDURE — 47562 LAPAROSCOPIC CHOLECYSTECTOMY: CPT

## 2023-09-02 PROCEDURE — 47562 LAPAROSCOPIC CHOLECYSTECTOMY: CPT | Performed by: STUDENT IN AN ORGANIZED HEALTH CARE EDUCATION/TRAINING PROGRAM

## 2023-09-02 PROCEDURE — 82010 KETONE BODYS QUAN: CPT | Performed by: INTERNAL MEDICINE

## 2023-09-02 PROCEDURE — 88341 IMHCHEM/IMCYTCHM EA ADD ANTB: CPT | Performed by: PATHOLOGY

## 2023-09-02 PROCEDURE — 99232 SBSQ HOSP IP/OBS MODERATE 35: CPT | Performed by: STUDENT IN AN ORGANIZED HEALTH CARE EDUCATION/TRAINING PROGRAM

## 2023-09-02 PROCEDURE — 88360 TUMOR IMMUNOHISTOCHEM/MANUAL: CPT | Performed by: PATHOLOGY

## 2023-09-02 PROCEDURE — 82948 REAGENT STRIP/BLOOD GLUCOSE: CPT

## 2023-09-02 PROCEDURE — 85025 COMPLETE CBC W/AUTO DIFF WBC: CPT | Performed by: INTERNAL MEDICINE

## 2023-09-02 PROCEDURE — 80048 BASIC METABOLIC PNL TOTAL CA: CPT | Performed by: INTERNAL MEDICINE

## 2023-09-02 PROCEDURE — 88304 TISSUE EXAM BY PATHOLOGIST: CPT | Performed by: PATHOLOGY

## 2023-09-02 PROCEDURE — 82805 BLOOD GASES W/O2 SATURATION: CPT | Performed by: STUDENT IN AN ORGANIZED HEALTH CARE EDUCATION/TRAINING PROGRAM

## 2023-09-02 PROCEDURE — C9113 INJ PANTOPRAZOLE SODIUM, VIA: HCPCS

## 2023-09-02 PROCEDURE — 99233 SBSQ HOSP IP/OBS HIGH 50: CPT | Performed by: INTERNAL MEDICINE

## 2023-09-02 PROCEDURE — C9113 INJ PANTOPRAZOLE SODIUM, VIA: HCPCS | Performed by: INTERNAL MEDICINE

## 2023-09-02 PROCEDURE — 0FT44ZZ RESECTION OF GALLBLADDER, PERCUTANEOUS ENDOSCOPIC APPROACH: ICD-10-PCS | Performed by: INTERNAL MEDICINE

## 2023-09-02 RX ORDER — LIDOCAINE HYDROCHLORIDE 20 MG/ML
INJECTION, SOLUTION EPIDURAL; INFILTRATION; INTRACAUDAL; PERINEURAL AS NEEDED
Status: DISCONTINUED | OUTPATIENT
Start: 2023-09-02 | End: 2023-09-02

## 2023-09-02 RX ORDER — ROCURONIUM BROMIDE 10 MG/ML
INJECTION, SOLUTION INTRAVENOUS AS NEEDED
Status: DISCONTINUED | OUTPATIENT
Start: 2023-09-02 | End: 2023-09-02

## 2023-09-02 RX ORDER — FENTANYL CITRATE 50 UG/ML
INJECTION, SOLUTION INTRAMUSCULAR; INTRAVENOUS AS NEEDED
Status: DISCONTINUED | OUTPATIENT
Start: 2023-09-02 | End: 2023-09-02

## 2023-09-02 RX ORDER — DEXAMETHASONE SODIUM PHOSPHATE 10 MG/ML
INJECTION, SOLUTION INTRAMUSCULAR; INTRAVENOUS AS NEEDED
Status: DISCONTINUED | OUTPATIENT
Start: 2023-09-02 | End: 2023-09-02

## 2023-09-02 RX ORDER — PROPOFOL 10 MG/ML
INJECTION, EMULSION INTRAVENOUS AS NEEDED
Status: DISCONTINUED | OUTPATIENT
Start: 2023-09-02 | End: 2023-09-02

## 2023-09-02 RX ORDER — MAGNESIUM HYDROXIDE 1200 MG/15ML
LIQUID ORAL AS NEEDED
Status: DISCONTINUED | OUTPATIENT
Start: 2023-09-02 | End: 2023-09-02 | Stop reason: HOSPADM

## 2023-09-02 RX ORDER — BUPIVACAINE HYDROCHLORIDE 2.5 MG/ML
INJECTION, SOLUTION EPIDURAL; INFILTRATION; INTRACAUDAL AS NEEDED
Status: DISCONTINUED | OUTPATIENT
Start: 2023-09-02 | End: 2023-09-02 | Stop reason: HOSPADM

## 2023-09-02 RX ORDER — INSULIN LISPRO 100 [IU]/ML
1-6 INJECTION, SOLUTION INTRAVENOUS; SUBCUTANEOUS
Status: DISCONTINUED | OUTPATIENT
Start: 2023-09-02 | End: 2023-09-02

## 2023-09-02 RX ORDER — ONDANSETRON 2 MG/ML
4 INJECTION INTRAMUSCULAR; INTRAVENOUS ONCE AS NEEDED
Status: DISCONTINUED | OUTPATIENT
Start: 2023-09-02 | End: 2023-09-02 | Stop reason: HOSPADM

## 2023-09-02 RX ORDER — FENTANYL CITRATE/PF 50 MCG/ML
25 SYRINGE (ML) INJECTION
Status: DISCONTINUED | OUTPATIENT
Start: 2023-09-02 | End: 2023-09-02 | Stop reason: HOSPADM

## 2023-09-02 RX ORDER — SODIUM CHLORIDE, SODIUM LACTATE, POTASSIUM CHLORIDE, CALCIUM CHLORIDE 600; 310; 30; 20 MG/100ML; MG/100ML; MG/100ML; MG/100ML
INJECTION, SOLUTION INTRAVENOUS CONTINUOUS PRN
Status: DISCONTINUED | OUTPATIENT
Start: 2023-09-02 | End: 2023-09-02

## 2023-09-02 RX ORDER — HYDROMORPHONE HCL/PF 1 MG/ML
0.5 SYRINGE (ML) INJECTION EVERY 4 HOURS PRN
Status: DISCONTINUED | OUTPATIENT
Start: 2023-09-02 | End: 2023-09-07 | Stop reason: HOSPADM

## 2023-09-02 RX ORDER — CEFAZOLIN SODIUM 1 G/3ML
INJECTION, POWDER, FOR SOLUTION INTRAMUSCULAR; INTRAVENOUS AS NEEDED
Status: DISCONTINUED | OUTPATIENT
Start: 2023-09-02 | End: 2023-09-02

## 2023-09-02 RX ORDER — ONDANSETRON 2 MG/ML
INJECTION INTRAMUSCULAR; INTRAVENOUS AS NEEDED
Status: DISCONTINUED | OUTPATIENT
Start: 2023-09-02 | End: 2023-09-02

## 2023-09-02 RX ORDER — PHENYLEPHRINE HCL IN 0.9% NACL 1 MG/10 ML
SYRINGE (ML) INTRAVENOUS AS NEEDED
Status: DISCONTINUED | OUTPATIENT
Start: 2023-09-02 | End: 2023-09-02

## 2023-09-02 RX ADMIN — AMLODIPINE BESYLATE 5 MG: 5 TABLET ORAL at 22:18

## 2023-09-02 RX ADMIN — FENTANYL CITRATE 50 MCG: 50 INJECTION, SOLUTION INTRAMUSCULAR; INTRAVENOUS at 10:22

## 2023-09-02 RX ADMIN — FENTANYL CITRATE 25 MCG: 50 INJECTION INTRAMUSCULAR; INTRAVENOUS at 11:36

## 2023-09-02 RX ADMIN — PANTOPRAZOLE SODIUM 40 MG: 40 INJECTION, POWDER, FOR SOLUTION INTRAVENOUS at 08:32

## 2023-09-02 RX ADMIN — OXYCODONE HYDROCHLORIDE 5 MG: 5 TABLET ORAL at 12:26

## 2023-09-02 RX ADMIN — INSULIN LISPRO 6 UNITS: 100 INJECTION, SOLUTION INTRAVENOUS; SUBCUTANEOUS at 17:09

## 2023-09-02 RX ADMIN — LIDOCAINE HYDROCHLORIDE 50 MG: 20 INJECTION, SOLUTION EPIDURAL; INFILTRATION; INTRACAUDAL; PERINEURAL at 09:16

## 2023-09-02 RX ADMIN — PROPOFOL 130 MG: 10 INJECTION, EMULSION INTRAVENOUS at 09:16

## 2023-09-02 RX ADMIN — FENTANYL CITRATE 50 MCG: 50 INJECTION, SOLUTION INTRAMUSCULAR; INTRAVENOUS at 09:16

## 2023-09-02 RX ADMIN — HEPARIN SODIUM 5000 UNITS: 5000 INJECTION INTRAVENOUS; SUBCUTANEOUS at 22:18

## 2023-09-02 RX ADMIN — HYDROMORPHONE HYDROCHLORIDE 0.5 MG: 1 INJECTION, SOLUTION INTRAMUSCULAR; INTRAVENOUS; SUBCUTANEOUS at 14:05

## 2023-09-02 RX ADMIN — CEFAZOLIN 2000 MG: 1 INJECTION, POWDER, FOR SOLUTION INTRAMUSCULAR; INTRAVENOUS at 09:25

## 2023-09-02 RX ADMIN — HEPARIN SODIUM 5000 UNITS: 5000 INJECTION INTRAVENOUS; SUBCUTANEOUS at 05:41

## 2023-09-02 RX ADMIN — ROCURONIUM BROMIDE 50 MG: 10 INJECTION, SOLUTION INTRAVENOUS at 09:16

## 2023-09-02 RX ADMIN — METRONIDAZOLE 500 MG: 500 INJECTION, SOLUTION INTRAVENOUS at 20:38

## 2023-09-02 RX ADMIN — Medication 100 MCG: at 09:26

## 2023-09-02 RX ADMIN — HYDROMORPHONE HYDROCHLORIDE 0.5 MG: 1 INJECTION, SOLUTION INTRAMUSCULAR; INTRAVENOUS; SUBCUTANEOUS at 22:18

## 2023-09-02 RX ADMIN — SODIUM CHLORIDE 10 UNITS/HR: 9 INJECTION, SOLUTION INTRAVENOUS at 22:52

## 2023-09-02 RX ADMIN — FENTANYL CITRATE 25 MCG: 50 INJECTION INTRAMUSCULAR; INTRAVENOUS at 11:22

## 2023-09-02 RX ADMIN — PANTOPRAZOLE SODIUM 40 MG: 40 INJECTION, POWDER, FOR SOLUTION INTRAVENOUS at 20:38

## 2023-09-02 RX ADMIN — HEPARIN SODIUM 5000 UNITS: 5000 INJECTION INTRAVENOUS; SUBCUTANEOUS at 14:48

## 2023-09-02 RX ADMIN — METRONIDAZOLE 500 MG: 500 INJECTION, SOLUTION INTRAVENOUS at 14:06

## 2023-09-02 RX ADMIN — SUGAMMADEX 200 MG: 100 INJECTION, SOLUTION INTRAVENOUS at 10:40

## 2023-09-02 RX ADMIN — DEXAMETHASONE SODIUM PHOSPHATE 5 MG: 10 INJECTION, SOLUTION INTRAMUSCULAR; INTRAVENOUS at 09:24

## 2023-09-02 RX ADMIN — OXYCODONE HYDROCHLORIDE 5 MG: 5 TABLET ORAL at 18:28

## 2023-09-02 RX ADMIN — SODIUM CHLORIDE, SODIUM LACTATE, POTASSIUM CHLORIDE, AND CALCIUM CHLORIDE: .6; .31; .03; .02 INJECTION, SOLUTION INTRAVENOUS at 09:13

## 2023-09-02 RX ADMIN — CEFTRIAXONE 1000 MG: 1 INJECTION, SOLUTION INTRAVENOUS at 13:48

## 2023-09-02 RX ADMIN — Medication 100 MCG: at 09:25

## 2023-09-02 RX ADMIN — INSULIN HUMAN 5 UNITS: 100 INJECTION, SOLUTION PARENTERAL at 20:30

## 2023-09-02 RX ADMIN — ONDANSETRON 4 MG: 2 INJECTION INTRAMUSCULAR; INTRAVENOUS at 10:25

## 2023-09-02 RX ADMIN — METRONIDAZOLE 500 MG: 500 INJECTION, SOLUTION INTRAVENOUS at 05:41

## 2023-09-02 NOTE — PROGRESS NOTES
Progress Note - General Surgery   Kanwal Nash 74 y.o. female MRN: [id number]  Unit/Bed#: -01 Encounter: [id number]    Assessment:  Lamont Ohara is a 74 y.o. female with acute cholecystitis. Febrile yesterday afternoon Tmax 101.1, remainder vitals are stable. WBC 12.6 from 15, remainder of labs are within normal limits    Plan:  • OR today for laparoscopic cholecystectomy  • N.p.o.  • IV fluids  • Monitor abdominal exam, labs, vitals  • PRN pain medication and anti-emetics  • Encourage ambulation  • DVT ppx: heparin  • Incentive spirometry 10 times/hour while awake  • Continue home medications as prescribed   • Remainder of care per primary team-Slim    Subjective/Objective    Subjective: No acute events overnight. Objective:     Blood pressure 105/52, pulse 76, temperature 99.2 °F (37.3 °C), resp. rate 18, SpO2 98 %. ,There is no height or weight on file to calculate BMI. Intake/Output Summary (Last 24 hours) at 9/2/2023 0831  Last data filed at 9/1/2023 1900  Gross per 24 hour   Intake 440 ml   Output 450 ml   Net -10 ml       Invasive Devices     Peripheral Intravenous Line  Duration           Peripheral IV 08/30/23 Left Antecubital 2 days                Physical Exam:   GEN: NAD  HEENT: NCAT, MMM  CV: RRR, no m/r/g  Lung: Normal effort, CTA B/L, no w/r/r  Ab: Soft, nondistended, tender to palpation in the right upper quadrant. Extrem: No CCE   Neuro: A+Ox3     Lab, Imaging and other studies:I have personally reviewed pertinent lab results.      VTE Pharmacologic Prophylaxis: Heparin  VTE Mechanical Prophylaxis: sequential compression device    Recent Results (from the past 36 hour(s))   Fingerstick Glucose (POCT)    Collection Time: 08/31/23  9:08 PM   Result Value Ref Range    POC Glucose 342 (H) 65 - 140 mg/dl   Fingerstick Glucose (POCT)    Collection Time: 08/31/23 11:45 PM   Result Value Ref Range    POC Glucose 352 (H) 65 - 140 mg/dl   CBC and differential Collection Time: 09/01/23  4:52 AM   Result Value Ref Range    WBC 15.08 (H) 4.31 - 10.16 Thousand/uL    RBC 3.76 (L) 3.81 - 5.12 Million/uL    Hemoglobin 11.2 (L) 11.5 - 15.4 g/dL    Hematocrit 34.6 (L) 34.8 - 46.1 %    MCV 92 82 - 98 fL    MCH 29.8 26.8 - 34.3 pg    MCHC 32.4 31.4 - 37.4 g/dL    RDW 13.9 11.6 - 15.1 %    MPV 9.7 8.9 - 12.7 fL    Platelets 805 143 - 188 Thousands/uL    nRBC 0 /100 WBCs    Neutrophils Relative 72 43 - 75 %    Immat GRANS % 1 0 - 2 %    Lymphocytes Relative 14 14 - 44 %    Monocytes Relative 12 4 - 12 %    Eosinophils Relative 1 0 - 6 %    Basophils Relative 0 0 - 1 %    Neutrophils Absolute 10.98 (H) 1.85 - 7.62 Thousands/µL    Immature Grans Absolute 0.07 0.00 - 0.20 Thousand/uL    Lymphocytes Absolute 2.16 0.60 - 4.47 Thousands/µL    Monocytes Absolute 1.75 (H) 0.17 - 1.22 Thousand/µL    Eosinophils Absolute 0.08 0.00 - 0.61 Thousand/µL    Basophils Absolute 0.04 0.00 - 0.10 Thousands/µL   Fingerstick Glucose (POCT)    Collection Time: 09/01/23  4:52 AM   Result Value Ref Range    POC Glucose 300 (H) 65 - 140 mg/dl   Fingerstick Glucose (POCT)    Collection Time: 09/01/23  7:18 AM   Result Value Ref Range    POC Glucose 279 (H) 65 - 140 mg/dl   Blood culture    Collection Time: 09/01/23  9:25 AM    Specimen: Arm, Left; Blood   Result Value Ref Range    Blood Culture Received in Microbiology Lab. Culture in Progress. Blood culture    Collection Time: 09/01/23  9:25 AM    Specimen: Arm, Right; Blood   Result Value Ref Range    Blood Culture Received in Microbiology Lab. Culture in Progress.     Fingerstick Glucose (POCT)    Collection Time: 09/01/23 11:02 AM   Result Value Ref Range    POC Glucose 429 (H) 65 - 140 mg/dl   Comprehensive metabolic panel    Collection Time: 09/01/23 11:38 AM   Result Value Ref Range    Sodium 131 (L) 135 - 147 mmol/L    Potassium 3.7 3.5 - 5.3 mmol/L    Chloride 99 96 - 108 mmol/L    CO2 25 21 - 32 mmol/L    ANION GAP 7 mmol/L    BUN 15 5 - 25 mg/dL Creatinine 0.73 0.60 - 1.30 mg/dL    Glucose 402 (H) 65 - 140 mg/dL    Calcium 8.6 8.4 - 10.2 mg/dL    Corrected Calcium 9.1 8.3 - 10.1 mg/dL    AST 11 (L) 13 - 39 U/L    ALT 23 7 - 52 U/L    Alkaline Phosphatase 61 34 - 104 U/L    Total Protein 6.8 6.4 - 8.4 g/dL    Albumin 3.4 (L) 3.5 - 5.0 g/dL    Total Bilirubin 0.54 0.20 - 1.00 mg/dL    eGFR 80 ml/min/1.73sq m   Fingerstick Glucose (POCT)    Collection Time: 09/01/23 12:17 PM   Result Value Ref Range    POC Glucose 370 (H) 65 - 140 mg/dl   Fingerstick Glucose (POCT)    Collection Time: 09/01/23  5:50 PM   Result Value Ref Range    POC Glucose 260 (H) 65 - 140 mg/dl   Fingerstick Glucose (POCT)    Collection Time: 09/02/23 12:16 AM   Result Value Ref Range    POC Glucose 266 (H) 65 - 140 mg/dl   CBC and differential    Collection Time: 09/02/23  4:49 AM   Result Value Ref Range    WBC 12.68 (H) 4.31 - 10.16 Thousand/uL    RBC 3.48 (L) 3.81 - 5.12 Million/uL    Hemoglobin 10.6 (L) 11.5 - 15.4 g/dL    Hematocrit 32.5 (L) 34.8 - 46.1 %    MCV 93 82 - 98 fL    MCH 30.5 26.8 - 34.3 pg    MCHC 32.6 31.4 - 37.4 g/dL    RDW 13.7 11.6 - 15.1 %    MPV 10.5 8.9 - 12.7 fL    Platelets 560 939 - 580 Thousands/uL    nRBC 0 /100 WBCs    Neutrophils Relative 74 43 - 75 %    Immat GRANS % 1 0 - 2 %    Lymphocytes Relative 13 (L) 14 - 44 %    Monocytes Relative 11 4 - 12 %    Eosinophils Relative 1 0 - 6 %    Basophils Relative 0 0 - 1 %    Neutrophils Absolute 9.47 (H) 1.85 - 7.62 Thousands/µL    Immature Grans Absolute 0.08 0.00 - 0.20 Thousand/uL    Lymphocytes Absolute 1.63 0.60 - 4.47 Thousands/µL    Monocytes Absolute 1.39 (H) 0.17 - 1.22 Thousand/µL    Eosinophils Absolute 0.08 0.00 - 0.61 Thousand/µL    Basophils Absolute 0.03 0.00 - 0.10 Thousands/µL   Comprehensive metabolic panel    Collection Time: 09/02/23  4:49 AM   Result Value Ref Range    Sodium 132 (L) 135 - 147 mmol/L    Potassium 3.9 3.5 - 5.3 mmol/L    Chloride 102 96 - 108 mmol/L    CO2 24 21 - 32 mmol/L ANION GAP 6 mmol/L    BUN 14 5 - 25 mg/dL    Creatinine 0.65 0.60 - 1.30 mg/dL    Glucose 280 (H) 65 - 140 mg/dL    Calcium 7.8 (L) 8.4 - 10.2 mg/dL    Corrected Calcium 8.5 8.3 - 10.1 mg/dL    AST 11 (L) 13 - 39 U/L    ALT 19 7 - 52 U/L    Alkaline Phosphatase 56 34 - 104 U/L    Total Protein 6.3 (L) 6.4 - 8.4 g/dL    Albumin 3.1 (L) 3.5 - 5.0 g/dL    Total Bilirubin 0.64 0.20 - 1.00 mg/dL    eGFR 87 ml/min/1.73sq m   Fingerstick Glucose (POCT)    Collection Time: 09/02/23  4:53 AM   Result Value Ref Range    POC Glucose 291 (H) 65 - 140 mg/dl

## 2023-09-02 NOTE — PROGRESS NOTES
[address]  Progress Note  Name: Sharifa Farrell I  MRN: [id number]  Unit/Bed#: OR POOL I Date of Admission: 8/30/2023   Date of Service: 9/2/2023 I Hospital Day: 2    Assessment/Plan   * Acute cholecystitis  Assessment & Plan  · Patient with right upper quadrant abdominal pain  · Right upper quadrant ultrasound showing acute cholecystitis  · General surgery following and plan for cholecystectomy today  · Follow-up blood cultures x2   · Ceftriaxone and Flagyl  · Continue to monitor    Elevated d-dimer  Assessment & Plan  · Present on admission, evidenced by a d-dimer of 1.42  · Elevated D-dimer may be secondary to underlying infection  · Unable to have a CTA Chest in setting of allergy to Iodine  · V/Q scan low probability  · Lower extremity Doppler negative for any DVT    Hypertension  Assessment & Plan  · Chronic  · Blood pressure currently controlled  · Continue home amlodipine and metoprolol  · Monitor vital signs closely    Diabetes [facility])  Assessment & Plan  Lab Results   Component Value Date    HGBA1C 8.8 (H) 08/31/2023       Recent Labs     09/01/23  1217 09/01/23  1750 09/02/23  0016 09/02/23  0453   POCGLU 370* 260* 266* 291*       Blood Sugar Average: Last 72 hrs:  (P) 674.6113849059009010     • Chronic, uncontrolled, as evidenced by a hemoglobin A1C of 10.3  • Current elevation in blood sugars likely related to underlying infection  • Hold home oral anti-diabetics  • Every 6 hours blood sugar checks and insulin given n.p.o.  • Hypoglycemia protocol             VTE Pharmacologic Prophylaxis: VTE Score: 6 Moderate Risk (Score 3-4) - Pharmacological DVT Prophylaxis Ordered: heparin. Patient Centered Rounds: I performed bedside rounds with nursing staff today. Discussions with Specialists or Other Care Team Provider: General surgery, case management    Education and Discussions with Family / Patient: Updated  (son) at bedside.     Total Time Spent on Date of Encounter in care of patient: 40 minutes This time was spent on one or more of the following: performing physical exam; counseling and coordination of care; obtaining or reviewing history; documenting in the medical record; reviewing/ordering tests, medications or procedures; communicating with other healthcare professionals and discussing with patient's family/caregivers. Current Length of Stay: 2 day(s)  Current Patient Status: Inpatient   Certification Statement: The patient will continue to require additional inpatient hospital stay due to Cholecystitis  Discharge Plan: Anticipate discharge in 24-48 hrs to home. Code Status: Level 1 - Full Code    Subjective:   Patient resting comfortably on examination. Patient still having abdominal pain which is similar compared to yesterday. Objective:     Vitals:   Temp (24hrs), Av.8 °F (37.7 °C), Min:99.2 °F (37.3 °C), Max:101.1 °F (38.4 °C)    Temp:  [99.2 °F (37.3 °C)-101.1 °F (38.4 °C)] 99.2 °F (37.3 °C)  HR:  [74-89] 76  Resp:  [18] 18  BP: (105-133)/(52-66) 105/52  SpO2:  [95 %-99 %] 98 %  There is no height or weight on file to calculate BMI. Input and Output Summary (last 24 hours): Intake/Output Summary (Last 24 hours) at 2023 0936  Last data filed at 2023 1900  Gross per 24 hour   Intake 440 ml   Output 250 ml   Net 190 ml       Physical Exam:   Physical Exam  Vitals and nursing note reviewed. Constitutional:       General: She is not in acute distress. Appearance: She is well-developed. HENT:      Head: Normocephalic and atraumatic. Eyes:      General: No scleral icterus. Conjunctiva/sclera: Conjunctivae normal.   Cardiovascular:      Rate and Rhythm: Normal rate and regular rhythm. Heart sounds: Normal heart sounds. No murmur heard. No friction rub. No gallop. Pulmonary:      Effort: Pulmonary effort is normal. No respiratory distress. Breath sounds: Normal breath sounds. No wheezing or rales. Abdominal:      General: Bowel sounds are normal. There is no distension. Palpations: Abdomen is soft. Tenderness: There is abdominal tenderness. Comments: Tenderness to palpation of right upper quadrant   Musculoskeletal:         General: Normal range of motion. Skin:     General: Skin is warm. Findings: No rash. Neurological:      Mental Status: She is alert and oriented to person, place, and time. Additional Data:     Labs:  Results from last 7 days   Lab Units 09/02/23  0449   WBC Thousand/uL 12.68*   HEMOGLOBIN g/dL 10.6*   HEMATOCRIT % 32.5*   PLATELETS Thousands/uL 229   NEUTROS PCT % 74   LYMPHS PCT % 13*   MONOS PCT % 11   EOS PCT % 1     Results from last 7 days   Lab Units 09/02/23  0449   SODIUM mmol/L 132*   POTASSIUM mmol/L 3.9   CHLORIDE mmol/L 102   CO2 mmol/L 24   BUN mg/dL 14   CREATININE mg/dL 0.65   ANION GAP mmol/L 6   CALCIUM mg/dL 7.8*   ALBUMIN g/dL 3.1*   TOTAL BILIRUBIN mg/dL 0.64   ALK PHOS U/L 56   ALT U/L 19   AST U/L 11*   GLUCOSE RANDOM mg/dL 280*         Results from last 7 days   Lab Units 09/02/23  0453 09/02/23  0016 09/01/23  1750 09/01/23  1217 09/01/23  1102 09/01/23  0718 09/01/23  0452 08/31/23  2345 08/31/23  2108 08/31/23  1654 08/31/23  1520 08/31/23  1106   POC GLUCOSE mg/dl 291* 266* 260* 370* 429* 279* 300* 352* 342* 340* 377* 389*     Results from last 7 days   Lab Units 08/31/23  0540   HEMOGLOBIN A1C % 8.8*     Results from last 7 days   Lab Units 08/30/23  1000   LACTIC ACID mmol/L 1.5       Lines/Drains:  Invasive Devices     Peripheral Intravenous Line  Duration           Peripheral IV 08/30/23 Left Antecubital 2 days          Drain  Duration           NG/OG/Enteral Tube Orogastric 18 Fr Center mouth <1 day          Airway  Duration           ETT  Oral;Cuffed 7 mm <1 day                      Imaging: No pertinent imaging reviewed.     Recent Cultures (last 7 days):   Results from last 7 days   Lab Units 09/01/23  0925   BLOOD CULTURE  Received in Microbiology Lab. Culture in Progress. Received in Microbiology Lab. Culture in Progress.        Last 24 Hours Medication List:   Current Facility-Administered Medications   Medication Dose Route Frequency Provider Last Rate   • [MAR Hold] acetaminophen  650 mg Oral Q6H PRN Abelino Sandhu MD     • [MAR Hold] amLODIPine  5 mg Oral HS Abelino Sandhu MD     • [MAR Hold] aspirin  81 mg Oral Daily Abelino Sandhu MD     • [MAR Hold] cefTRIAXone  1,000 mg Intravenous Q24H John Bentley DO 1,000 mg (09/01/23 1441)   • [MAR Hold] heparin (porcine)  5,000 Units Subcutaneous Q8H Jeremy Ramey MD     • [MAR Hold] hydrALAZINE  5 mg Intravenous Q6H PRN NISHI Hathaway     • [MAR Hold] insulin lispro  1-6 Units Subcutaneous Q6H [facility] John Bentley DO     • [MAR Hold] metroNIDAZOLE  500 mg Intravenous Q8H John Bentley  mg (09/02/23 0541)   • [MAR Hold] oxyCODONE  5 mg Oral Q4H PRN Abelino Sandhu MD     • [MAR Hold] oxyCODONE  2.5 mg Oral Q4H PRN Abelino Sandhu MD     • [MAR Hold] pantoprazole  40 mg Intravenous Q12H [facility]     • sodium chloride  75 mL/hr Intravenous Continuous John Bentley DO 75 mL/hr (09/01/23 1441)   • [MAR Hold] temazepam  30 mg Oral HS PRN Amelia Whitt PA-C       Facility-Administered Medications Ordered in Other Encounters   Medication Dose Route Frequency Provider Last Rate   • ceFAZolin   Intravenous PRN Vinnie Corado CRNA     • dexamethasone (PF)   Intravenous PRN Vinnie Corado CRNA     • fentanyl citrate (PF)   Intravenous PRN Vinnie Corado CRNA     • lactated ringers   Intravenous Continuous PRN Adonis Mendez MD     • lidocaine (PF)   Intravenous PRN Vinnie Corado CRNA     • phenylephrine   Intravenous PRN Vinnie Corado CRNA     • propofol   Intravenous PRN Vinnie Corado CRNA     • ROCuronium   Intravenous PRN Vinnie Corado CRNA          Today, Patient Was Seen By: John Bentley DO    **Please Note: This note may have been constructed using a voice recognition system. **

## 2023-09-02 NOTE — ASSESSMENT & PLAN NOTE
Lab Results   Component Value Date    HGBA1C 8.8 (H) 08/31/2023       Recent Labs     09/01/23  1217 09/01/23  1750 09/02/23  0016 09/02/23  0453   POCGLU 370* 260* 266* 291*       Blood Sugar Average: Last 72 hrs:  (P) 073.7301635753996388     • Chronic, uncontrolled, as evidenced by a hemoglobin A1C of 10.3  • Current elevation in blood sugars likely related to underlying infection  • Hold home oral anti-diabetics  • Every 6 hours blood sugar checks and insulin given n.p.o.  • Hypoglycemia protocol

## 2023-09-02 NOTE — OP NOTE
OPERATIVE REPORT  PATIENT NAME: Unruly Marr    :  1950  MRN: [id number]  Pt Location: [facility] OR ROOM 04    SURGERY DATE: 2023    Surgeon(s) and Role:     * Ronald Santiago DO - Primary     * Kirstie Gordon PA-C - Assisting    Preop Diagnosis:  Acute cholecystitis [K81.0]    Post-Op Diagnosis Codes:     * Acute cholecystitis [K81.0]    Procedure(s):  CHOLECYSTECTOMY LAPAROSCOPIC    Specimen(s):  ID Type Source Tests Collected by Time Destination   1 : Gallbladder and contents  Tissue Gallbladder TISSUE EXAM Ronald Santiago DO 2023 1026        Estimated Blood Loss:   Minimal    Drains:  NG/OG/Enteral Tube Orogastric 18 Fr Center mouth (Active)   Number of days: 0       Anesthesia Type:   General    Operative Indications:  Acute cholecystitis [K81.0]    Operative Findings:  Gallbladder was acutely inflamed and distended with significant wall thickening  There were multiple adhesions from the surrounding tissue to the gallbladder which were taken down meticulously using electrocautery and blunt dissection  Gallbladder needed to be decompressed to be grasped, purulent hydrops was noted  The liver had a cirrhotic/scarred appearance    Complications:   None    Procedure and Technique:  The patient was seen again in Preoperative Holding. All the risks, benefits, complications, treatment options, and expected outcomes were discussed with the patient and family at length. All questions were answered to satisfaction. There was concurrence with the proposed plan and informed consent was obtained. The site of surgery was properly noted/marked. The patient was taken to Operating Room, identified, and the procedure verified as laparoscopic cholecystectomy, possible open. The patient was placed in the supine position on the operating room table. The patient had received preoperative antibiotics and SCDs placed on the bilateral lower extremities.   Anesthesia was then induced and the patient was intubated. The abdomen was then prepped and draped in the usual sterile fashion using ChloraPrep. A Time-Out was then performed with all involved present confirming the correct patient, procedure, antibiotics, and any additional concerns. A 1.5 centimeter supraumbilical incision was made in a transverse fashion using a #15 blade and the umbilical stalk was grasped and elevated. Using blunt dissection the fascia was exposed and was incised. Using a large blunt Bessy clamp the peritoneum was entered under direct visualization. A 11 mm port was then placed in the fascial opening and pneumoperitoneum was established. Initial pressure upon establishing pneumoperitoneum was noted to be low. Additional 5 mm ports were placed under direct visualization in the following locations:  Subxiphoid, Right subcostal in the midclavicular line, Right subcostal and anterior axillary line. The patient was then placed in reverse Trendelenburg and left lateral decubitus position. The gallbladder was exposed and was noted to be acutely inflamed, distended, with multiple adhesions from the surrounding fatty tissue. The gallbladder needed to be decompressed with the suction  and purulent hydrops was noted. The gallbladder wall was very thick and it was hard to grasp the gallbladder. The gallbladder fundus was then grasped and retracted cephalad. The gallbladder infundibulum was grasped and retracted cephalad and lateral.  Using a combination of blunt dissection using a [place] and the suction  both the cystic duct and cystic artery were exposed and skeletonized. Critical view was then obtained. The cystic duct was clipped using 5 mm clips, 2 clips distally and 1 clip proximally. The cystic duct was then transected using laparoscopic scissors. The cystic artery was clipped using 5 mm clips, 1 clip distally and 2 clips proximally.   The cystic artery was then transected using laparoscopic scissors. The gallbladder was then dissected off the liver bed using hook electrocautery. Hemostasis was noted. Both the cystic artery and cystic duct stumps were evaluated and no leakage of bile or blood was noted. The gallbladder was placed in the Endo-Catch bag and removed via the umbilical port. The abdomen was desufflated and the supraumbilical fascial incision was closed with 0 Vicryl in an interrupted fashion. The abdomen was then reinsufflated and the fascial incision was inspected and noted to be hemostatic without any insufflation leakage. Irrigation was instilled above the liver and in the infra hepatic area and was suctioned until clear. The remaining 5 mm ports were removed and the abdomen was desufflated. Local anesthesia infiltrated in the port sites using 0.25% Marcaine. The port sites were then closed using 4-0 Monocryl. The abdomen was then cleansed and dried and Gosia-Strips, 2 x 2, Tegaderm were used to cover the sites. All instrument, sponge, and needle counts were noted to be correct at the conclusion of the case. The patient was brought to the PACU in stable and satisfactory condition. I was present for the entire procedure., A qualified resident physician was not available. and A physician assistant was required during the procedure for retraction, tissue handling, dissection and suturing.     Patient Disposition:  extubated and stable        SIGNATURE: Monica Thorpe DO  DATE: September 2, 2023  TIME: 10:48 AM

## 2023-09-02 NOTE — DISCHARGE INSTR - AVS FIRST PAGE
Follow up: Following discharge from the hospital call the office in 1-2 days to set up a post operative appointment to be seen in 2 weeks. [phone]  Call the office if you have increased pain not relieved with pain medicine. Call the office if you have a fever,redness, the wound opens up, you have pus draining from your incision. AFTER YOU LEAVE: Following discharge from the hospital, you may have some questions about your procedure, your activities or your general condition. These instructions may answer some of your questions and help you adjust during the first few days following your operation. You can expect to be sore and tender mostly around the incisions. This pain should last approximally 5 days and gradually improve daily. Incisions: You may apply ice to the incisions to help with pain. It is normal to have some bruising, swelling or mild discoloration around the incision. If increasing redness or pain develops, call our office immediately. Do not apply any creams, lotions, or ointments. If you have dressings: You may remove the gauze dressing from your incisions 48 hours after surgery. Underneath this dressing is a tape like dressing called Steri Strips. Leave the steri strips in place for 5-7 days. They may fall off on their own. This is okay. Bathing: You may shower daily with soap and water the day after the procedure. It is OK to GENTLY wash the incision with soap and water then pat dry. DO NOT SCRUB. Do NOT soak incision in a tub, pool, or hot tub for 2 weeks. Diet:   Resume your normal diet unless specified otherwise. We recommend you slowly advance your diet. Try to start with softer bland foods and gradually advance as tolerated. Be sure to consume plenty of water. Avoid alcohol. Activity/Restrictions: The evening following the procedure you should rest as much as possible, sitting, lying or reclining.   you should be sure someone remains with you until the next morning. Gradually increase your activity daily. Walking 3-4 times daily is good and stairs are ok. Listen to your body. If you start to get tired or sore then rest.   No strenuous activity or exercise for 3-4 weeks. No heavy lifting, pushing or pulling. No driving for 5 days or while taking narcotics for pain. Return or work: You may return to work or other activities as soon as your pain is controlled and you feel comfortable. For many people, this is 5 to 7 days after surgery. If your job requires heavy lifting you will need to be on light duty for 2-3 weeks. Medication:   If you were given a prescription for Percocet, Norco, or Vicodin for pain be sure to eat prior to taking as these medications as they may cause nausea and vomiting on an empty stomach. If you do not want to take stronger medications for pain, you may take Tylenol, Aleve OR Ibuprofen  DO NOT take Tylenol  (acetaminophen) with these medication for a fever or for further pain control as these medications already contain Tylenol in them. Take one or the other. Do not exceed more than 4000 mg of acetaminophen in 24 hours or 3000 mg if you have liver disease. If you were given an antibiotic take it until it is finished. Constipation:   Patients often experience constipation after surgery. You may take a stool softener (Colace) to help prevent constipation. If you experience significant nausea or vomiting after abdominal surgery, call the office before trying any stronger medications or laxatives  Call the office if you haven't had a Bowel movement in 2-3days after surgery    Contact your healthcare provider if:   You have a fever over 101°F (38°C) or chills. You have pain or nausea that is not relieved by medicine. You have redness and swelling around your incisions, or blood or pus is leaking from your incisions. You are constipated or have diarrhea.     Your skin or eyes are yellow, or your bowel movements are pale. You have questions or concerns about your surgery, condition, or care. Seek care immediately or call 911 if:   You cannot stop vomiting. Your bowel movements are black or bloody. You have pain in your abdomen and it is swollen or hard. Your arm or leg feels warm, tender, and painful. It may look swollen and red. You feel lightheaded, short of breath, and have chest pain. You cough up blood. For COVID-19 infection    Please follow-up with primary care provider within the week  Paxlovid has been sent to the pharmacy, take twice a day for 5 days  Please monitor blood pressure while on Paxlovid as Paxlovid sometimes can interact with amlodipine blood pressure pill and can cause low blood pressure. Also monitor for any drowsiness while on Paxlovid and oxycodone. Please quarantine for 10 days and mask, attempt to isolate from others even while at home  Can end quarantine after 10 days or if patient has a fever, patient must be fever free for 24 hours to end quarantine. For family members that have been in contact with patient and exposed to COVID-19 if they do not have any symptoms they do not have to isolate at home, but must wear a mask if you have to go out in public  at any time for 10 days through 9/16. Can test yourself after 5 days on 9/12 to see if you are COVID-positive or not. Please watch out for any worsening shortness of breath, fevers. If any worsening shortness of breath, fevers or any low oxygen level below 90% please return back to emergency room  If any family members develop any symptoms, or COVID-positive after 5 days, they must quarantine for 10 days and follow above instructions. Wash hands.

## 2023-09-02 NOTE — ANESTHESIA POSTPROCEDURE EVALUATION
Post-Op Assessment Note    CV Status:  Stable    Pain management: adequate     Mental Status:  Alert and awake   Hydration Status:  Euvolemic   PONV Controlled:  Controlled   Airway Patency:  Patent      Post Op Vitals Reviewed: Yes      Staff: CRNA         No notable events documented.     /68 (09/02/23 1104)    Temp   98.8   Pulse 73 (09/02/23 1104)   Resp   16   SpO2 98

## 2023-09-02 NOTE — PLAN OF CARE
Problem: GENITOURINARY - ADULT  Goal: Absence of urinary retention  Description: INTERVENTIONS:  - Assess patient’s ability to void and empty bladder  - Monitor I/O  - Bladder scan as needed  - Discuss with physician/AP medications to alleviate retention as needed  - Discuss catheterization for long term situations as appropriate  9/2/2023 1846 by Veronica Murillo RN  Outcome: Progressing  9/2/2023 1846 by Veronica Murillo RN  Outcome: Progressing

## 2023-09-02 NOTE — ASSESSMENT & PLAN NOTE
· Present on admission, evidenced by a d-dimer of 1.42  · Elevated D-dimer may be secondary to underlying infection  · Unable to have a CTA Chest in setting of allergy to Iodine  · V/Q scan low probability  · Lower extremity Doppler negative for any DVT

## 2023-09-02 NOTE — ASSESSMENT & PLAN NOTE
· Patient with right upper quadrant abdominal pain  · Right upper quadrant ultrasound showing acute cholecystitis  · General surgery following and plan for cholecystectomy today  · Follow-up blood cultures x2   · Ceftriaxone and Flagyl  · Continue to monitor

## 2023-09-03 PROBLEM — A41.9 SEPSIS (HCC): Status: ACTIVE | Noted: 2023-09-03

## 2023-09-03 LAB
ANION GAP SERPL CALCULATED.3IONS-SCNC: 6 MMOL/L
BASOPHILS # BLD AUTO: 0.01 THOUSANDS/ÂΜL (ref 0–0.1)
BASOPHILS NFR BLD AUTO: 0 % (ref 0–1)
BUN SERPL-MCNC: 21 MG/DL (ref 5–25)
CALCIUM SERPL-MCNC: 8.5 MG/DL (ref 8.4–10.2)
CHLORIDE SERPL-SCNC: 105 MMOL/L (ref 96–108)
CO2 SERPL-SCNC: 23 MMOL/L (ref 21–32)
CREAT SERPL-MCNC: 0.77 MG/DL (ref 0.6–1.3)
EOSINOPHIL # BLD AUTO: 0 THOUSAND/ÂΜL (ref 0–0.61)
EOSINOPHIL NFR BLD AUTO: 0 % (ref 0–6)
ERYTHROCYTE [DISTWIDTH] IN BLOOD BY AUTOMATED COUNT: 13.8 % (ref 11.6–15.1)
GFR SERPL CREATININE-BSD FRML MDRD: 75 ML/MIN/1.73SQ M
GLUCOSE SERPL-MCNC: 149 MG/DL (ref 65–140)
GLUCOSE SERPL-MCNC: 176 MG/DL (ref 65–140)
GLUCOSE SERPL-MCNC: 196 MG/DL (ref 65–140)
GLUCOSE SERPL-MCNC: 201 MG/DL (ref 65–140)
GLUCOSE SERPL-MCNC: 243 MG/DL (ref 65–140)
GLUCOSE SERPL-MCNC: 247 MG/DL (ref 65–140)
GLUCOSE SERPL-MCNC: 255 MG/DL (ref 65–140)
GLUCOSE SERPL-MCNC: 319 MG/DL (ref 65–140)
GLUCOSE SERPL-MCNC: 345 MG/DL (ref 65–140)
GLUCOSE SERPL-MCNC: 434 MG/DL (ref 65–140)
GLUCOSE SERPL-MCNC: 488 MG/DL (ref 65–140)
HCT VFR BLD AUTO: 29.1 % (ref 34.8–46.1)
HGB BLD-MCNC: 9.5 G/DL (ref 11.5–15.4)
IMM GRANULOCYTES # BLD AUTO: 0.12 THOUSAND/UL (ref 0–0.2)
IMM GRANULOCYTES NFR BLD AUTO: 1 % (ref 0–2)
LYMPHOCYTES # BLD AUTO: 0.98 THOUSANDS/ÂΜL (ref 0.6–4.47)
LYMPHOCYTES NFR BLD AUTO: 8 % (ref 14–44)
MCH RBC QN AUTO: 30.2 PG (ref 26.8–34.3)
MCHC RBC AUTO-ENTMCNC: 32.6 G/DL (ref 31.4–37.4)
MCV RBC AUTO: 92 FL (ref 82–98)
MONOCYTES # BLD AUTO: 0.84 THOUSAND/ÂΜL (ref 0.17–1.22)
MONOCYTES NFR BLD AUTO: 7 % (ref 4–12)
NEUTROPHILS # BLD AUTO: 10.16 THOUSANDS/ÂΜL (ref 1.85–7.62)
NEUTS SEG NFR BLD AUTO: 84 % (ref 43–75)
NRBC BLD AUTO-RTO: 0 /100 WBCS
PLATELET # BLD AUTO: 266 THOUSANDS/UL (ref 149–390)
PMV BLD AUTO: 10.3 FL (ref 8.9–12.7)
POTASSIUM SERPL-SCNC: 3.8 MMOL/L (ref 3.5–5.3)
RBC # BLD AUTO: 3.15 MILLION/UL (ref 3.81–5.12)
SODIUM SERPL-SCNC: 134 MMOL/L (ref 135–147)
WBC # BLD AUTO: 12.11 THOUSAND/UL (ref 4.31–10.16)

## 2023-09-03 PROCEDURE — C9113 INJ PANTOPRAZOLE SODIUM, VIA: HCPCS

## 2023-09-03 PROCEDURE — 82948 REAGENT STRIP/BLOOD GLUCOSE: CPT

## 2023-09-03 PROCEDURE — 80048 BASIC METABOLIC PNL TOTAL CA: CPT

## 2023-09-03 PROCEDURE — 99233 SBSQ HOSP IP/OBS HIGH 50: CPT | Performed by: INTERNAL MEDICINE

## 2023-09-03 PROCEDURE — 99024 POSTOP FOLLOW-UP VISIT: CPT | Performed by: STUDENT IN AN ORGANIZED HEALTH CARE EDUCATION/TRAINING PROGRAM

## 2023-09-03 PROCEDURE — 85025 COMPLETE CBC W/AUTO DIFF WBC: CPT

## 2023-09-03 RX ADMIN — ACETAMINOPHEN 650 MG: 325 TABLET, FILM COATED ORAL at 18:30

## 2023-09-03 RX ADMIN — METRONIDAZOLE 500 MG: 500 INJECTION, SOLUTION INTRAVENOUS at 05:26

## 2023-09-03 RX ADMIN — PANTOPRAZOLE SODIUM 40 MG: 40 INJECTION, POWDER, FOR SOLUTION INTRAVENOUS at 21:30

## 2023-09-03 RX ADMIN — CEFTRIAXONE 1000 MG: 1 INJECTION, SOLUTION INTRAVENOUS at 13:37

## 2023-09-03 RX ADMIN — SODIUM CHLORIDE 4 UNITS/HR: 9 INJECTION, SOLUTION INTRAVENOUS at 08:27

## 2023-09-03 RX ADMIN — OXYCODONE HYDROCHLORIDE 5 MG: 5 TABLET ORAL at 13:33

## 2023-09-03 RX ADMIN — METRONIDAZOLE 500 MG: 500 INJECTION, SOLUTION INTRAVENOUS at 13:37

## 2023-09-03 RX ADMIN — PANTOPRAZOLE SODIUM 40 MG: 40 INJECTION, POWDER, FOR SOLUTION INTRAVENOUS at 08:21

## 2023-09-03 RX ADMIN — ASPIRIN 81 MG: 81 TABLET, COATED ORAL at 08:21

## 2023-09-03 RX ADMIN — OXYCODONE HYDROCHLORIDE 5 MG: 5 TABLET ORAL at 05:26

## 2023-09-03 RX ADMIN — HEPARIN SODIUM 5000 UNITS: 5000 INJECTION INTRAVENOUS; SUBCUTANEOUS at 05:26

## 2023-09-03 RX ADMIN — ACETAMINOPHEN 650 MG: 325 TABLET, FILM COATED ORAL at 08:21

## 2023-09-03 RX ADMIN — HEPARIN SODIUM 5000 UNITS: 5000 INJECTION INTRAVENOUS; SUBCUTANEOUS at 13:34

## 2023-09-03 RX ADMIN — AMLODIPINE BESYLATE 5 MG: 5 TABLET ORAL at 21:46

## 2023-09-03 RX ADMIN — METRONIDAZOLE 500 MG: 500 INJECTION, SOLUTION INTRAVENOUS at 21:29

## 2023-09-03 RX ADMIN — HEPARIN SODIUM 5000 UNITS: 5000 INJECTION INTRAVENOUS; SUBCUTANEOUS at 21:28

## 2023-09-03 RX ADMIN — TEMAZEPAM 30 MG: 15 CAPSULE ORAL at 21:48

## 2023-09-03 NOTE — ASSESSMENT & PLAN NOTE
· Patient with right upper quadrant abdominal pain  · Right upper quadrant ultrasound showing acute cholecystitis  · 9/2 cholecystectomy  · General surgery following  · Blood culture x2 no growth at 2 days  · Ceftriaxone and Flagyl for total 5 days  · Continue to monitor

## 2023-09-03 NOTE — ASSESSMENT & PLAN NOTE
Lab Results   Component Value Date    HGBA1C 8.8 (H) 08/31/2023       Recent Labs     09/04/23  0211 09/04/23  0424 09/04/23  0638 09/04/23  0804   POCGLU 165* 152* 104 83       Blood Sugar Average: Last 72 hrs:  (P) 259.08     • Chronic, uncontrolled, as evidenced by a hemoglobin A1C of 10.3  • Patient will be started on Lantus 18 units and insulin drip will be discontinued.   Patient required large amount of insulin over the past 24 hours and likely will need insulin upon discharge  • Insulin drip discontinued  • Correctional scale insulin  • Continue to monitor and adjust as needed  • Hypoglycemia protocol

## 2023-09-03 NOTE — PROGRESS NOTES
[address]  Progress Note  Name: Emilia Dubois I  MRN: [id number]  Unit/Bed#: -01 I Date of Admission: 8/30/2023   Date of Service: 9/3/2023 I Hospital Day: 3    Assessment/Plan   * Acute cholecystitis  Assessment & Plan  · Patient with right upper quadrant abdominal pain  · Right upper quadrant ultrasound showing acute cholecystitis  · 9/2 cholecystectomy  · General surgery following  · Blood culture x2 no growth at 2 days  · Ceftriaxone and Flagyl  · Continue to monitor    Elevated d-dimer  Assessment & Plan  · Present on admission, evidenced by a d-dimer of 1.42  · Elevated D-dimer may be secondary to underlying infection  · Unable to have a CTA Chest in setting of allergy to Iodine  · V/Q scan low probability  · Lower extremity Doppler negative for any DVT    Diabetes [facility])  Assessment & Plan  Lab Results   Component Value Date    HGBA1C 8.8 (H) 08/31/2023       Recent Labs     09/03/23  0059 09/03/23  0257 09/03/23  0516 09/03/23  0734   POCGLU 488* 434* 319* 201*       Blood Sugar Average: Last 72 hrs:  (P) 334.4231890724924486     • Chronic, uncontrolled, as evidenced by a hemoglobin A1C of 10.3  • Current elevation in blood sugars likely related to underlying infection  • Hold home oral anti-diabetics  • Patient placed on insulin drip given blood sugars greater than 500  • Continue insulin drip and likely transition to insulin tomorrow  • Hypoglycemia protocol    Hypertension  Assessment & Plan  · Chronic  · Blood pressure currently controlled  · Continue home amlodipine and metoprolol  · Monitor vital signs closely             VTE Pharmacologic Prophylaxis: VTE Score: 6 Moderate Risk (Score 3-4) - Pharmacological DVT Prophylaxis Ordered: heparin. Patient Centered Rounds: I performed bedside rounds with nursing staff today.   Discussions with Specialists or Other Care Team Provider: case management    Education and Discussions with Family / Patient: Attempted to update  (son) via phone. Left voicemail. Total Time Spent on Date of Encounter in care of patient: 40 minutes This time was spent on one or more of the following: performing physical exam; counseling and coordination of care; obtaining or reviewing history; documenting in the medical record; reviewing/ordering tests, medications or procedures; communicating with other healthcare professionals and discussing with patient's family/caregivers. Current Length of Stay: 3 day(s)  Current Patient Status: Inpatient   Certification Statement: The patient will continue to require additional inpatient hospital stay due to Hyperglycemia  Discharge Plan: Anticipate discharge in 24-48 hrs to home. Code Status: Level 1 - Full Code    Subjective:   Patient resting comfortably on examination. Patient noted to have elevated blood sugars overnight and was placed on insulin drip. Objective:     Vitals:   Temp (24hrs), Av.5 °F (36.9 °C), Min:98.3 °F (36.8 °C), Max:98.7 °F (37.1 °C)    Temp:  [98.3 °F (36.8 °C)-98.7 °F (37.1 °C)] 98.6 °F (37 °C)  HR:  [69-98] 69  Resp:  [18-20] 20  BP: (127-147)/(57-70) 131/59  SpO2:  [90 %-96 %] 95 %  Body mass index is 26.22 kg/m². Input and Output Summary (last 24 hours): Intake/Output Summary (Last 24 hours) at 9/3/2023 1314  Last data filed at 2023 2252  Gross per 24 hour   Intake 280 ml   Output 200 ml   Net 80 ml       Physical Exam:   Physical Exam  Vitals and nursing note reviewed. Constitutional:       General: She is not in acute distress. Appearance: She is well-developed. HENT:      Head: Normocephalic and atraumatic. Eyes:      General: No scleral icterus. Conjunctiva/sclera: Conjunctivae normal.   Cardiovascular:      Rate and Rhythm: Normal rate and regular rhythm. Heart sounds: Normal heart sounds. No murmur heard. No friction rub. No gallop. Pulmonary:      Effort: Pulmonary effort is normal. No respiratory distress. Breath sounds: Normal breath sounds. No wheezing or rales. Abdominal:      General: Bowel sounds are normal. There is no distension. Palpations: Abdomen is soft. Tenderness: There is no abdominal tenderness. Musculoskeletal:         General: Normal range of motion. Skin:     General: Skin is warm. Findings: No rash. Neurological:      Mental Status: She is alert and oriented to person, place, and time. Additional Data:     Labs:  Results from last 7 days   Lab Units 09/03/23  0518   WBC Thousand/uL 12.11*   HEMOGLOBIN g/dL 9.5*   HEMATOCRIT % 29.1*   PLATELETS Thousands/uL 266   NEUTROS PCT % 84*   LYMPHS PCT % 8*   MONOS PCT % 7   EOS PCT % 0     Results from last 7 days   Lab Units 09/03/23  0518 09/02/23  2226 09/02/23  0449   SODIUM mmol/L 134*   < > 132*   POTASSIUM mmol/L 3.8   < > 3.9   CHLORIDE mmol/L 105   < > 102   CO2 mmol/L 23   < > 24   BUN mg/dL 21   < > 14   CREATININE mg/dL 0.77   < > 0.65   ANION GAP mmol/L 6   < > 6   CALCIUM mg/dL 8.5   < > 7.8*   ALBUMIN g/dL  --   --  3.1*   TOTAL BILIRUBIN mg/dL  --   --  0.64   ALK PHOS U/L  --   --  56   ALT U/L  --   --  19   AST U/L  --   --  11*   GLUCOSE RANDOM mg/dL 345*   < > 280*    < > = values in this interval not displayed. Results from last 7 days   Lab Units 09/03/23  1200 09/03/23  1016 09/03/23  0734 09/03/23  0516 09/03/23  0257 09/03/23  0059 09/02/23  2347 09/02/23  2202 09/02/23  1957 09/02/23  1953 09/02/23  1622 09/02/23  1216   POC GLUCOSE mg/dl 247* 255* 201* 319* 434* 488* >500* >500* >500* >500* 407* 305*     Results from last 7 days   Lab Units 08/31/23  0540   HEMOGLOBIN A1C % 8.8*     Results from last 7 days   Lab Units 08/30/23  1000   LACTIC ACID mmol/L 1.5       Lines/Drains:  Invasive Devices     Peripheral Intravenous Line  Duration           Peripheral IV 09/03/23 Dorsal (posterior); Left Forearm <1 day    Peripheral IV 09/03/23 Dorsal (posterior); Right Hand <1 day Imaging: No pertinent imaging reviewed. Recent Cultures (last 7 days):   Results from last 7 days   Lab Units 09/01/23  0925   BLOOD CULTURE  No Growth at 48 hrs. No Growth at 48 hrs. Last 24 Hours Medication List:   Current Facility-Administered Medications   Medication Dose Route Frequency Provider Last Rate   • acetaminophen  650 mg Oral Q6H PRN Darreld Cardona, PA-C     • amLODIPine  5 mg Oral HS [facility] [place]     • aspirin  81 mg Oral Daily Darreld [place]     • cefTRIAXone  1,000 mg Intravenous Q24H Darreld Cardona, PA-C 1,000 mg (09/02/23 1348)   • heparin (porcine)  5,000 Units Subcutaneous [facility], [place]     • hydrALAZINE  5 mg Intravenous Q6H PRN Darreld Cardona, PA-C     • HYDROmorphone  0.5 mg Intravenous Q4H PRN Ritchie Ivy DO     • insulin regular (HumuLIN R,NovoLIN R) 1 Units/mL in sodium chloride 0.9 % 100 mL infusion  0.3-21 Units/hr Intravenous Titrated Helen South PA-C 6 Units/hr (09/03/23 1016)   • metroNIDAZOLE  500 mg Intravenous Q8H Darreld Brendan, PA-C 500 mg (09/03/23 0526)   • oxyCODONE  5 mg Oral Q4H PRN Darreld Cardona, PA-C     • oxyCODONE  2.5 mg Oral Q4H PRN Darreld Cardona, PA-C     • pantoprazole  40 mg Intravenous Q12H [id number] [place], [place]     • temazepam  30 mg Oral HS PRN Darreld Brendan, PA-C          Today, Patient Was Seen By: Ritchie vIy DO    **Please Note: This note may have been constructed using a voice recognition system. **

## 2023-09-03 NOTE — PROGRESS NOTES
Progress Note - General Surgery   King Bloodgood Charity 74 y.o. female MRN: [id number]  Unit/Bed#: -01 Encounter: [id number]    Assessment:  Spenser Graves is a 74 y.o. female POD1 s/p laparoscopic cholecystectomy. AVSS, AM labs pending    Plan:  • Continue regular diet as tolerated. Okay for discharge today from surgical standpoint. • Surgical dressings can be removed by patient tomorrow. Patient and family given discharge instructions as well as follow-up instructions. All questions answered. • Continue antibiotics p.o. to complete a 5-day regimen  • PRN pain medication and anti-emetics  • Encourage ambulation  • DVT ppx: heparin  • Incentive spirometry 10 times/hour while awake  • Continue home medications as prescribed   • Remainder of care per primary team-Slim  • General surgery will sign off. Subjective/Objective    Subjective: No acute events overnight. Objective:     Blood pressure 127/59, pulse 83, temperature 98.7 °F (37.1 °C), resp. rate 18, SpO2 91 %. ,There is no height or weight on file to calculate BMI. Intake/Output Summary (Last 24 hours) at 9/3/2023 0326  Last data filed at 9/2/2023 2252  Gross per 24 hour   Intake 280 ml   Output 200 ml   Net 80 ml       Invasive Devices     Peripheral Intravenous Line  Duration           Peripheral IV 08/30/23 Left Antecubital 3 days                Physical Exam:   GEN: NAD  HEENT: NCAT, MMM  CV: RRR, no m/r/g  Lung: Normal effort, CTA B/L, no w/r/r  Ab: Soft, nondistended, appropriately tender to palpation around surgical incisions. Incisions are clean dry and intact without erythema, edema, exudate. Extrem: No CCE   Neuro: A+Ox3     Lab, Imaging and other studies:I have personally reviewed pertinent lab results.      VTE Pharmacologic Prophylaxis: Heparin  VTE Mechanical Prophylaxis: sequential compression device    Recent Results (from the past 36 hour(s))   Fingerstick Glucose (POCT)    Collection Time: 09/01/23  5:50 PM Result Value Ref Range    POC Glucose 260 (H) 65 - 140 mg/dl   Fingerstick Glucose (POCT)    Collection Time: 09/02/23 12:16 AM   Result Value Ref Range    POC Glucose 266 (H) 65 - 140 mg/dl   CBC and differential    Collection Time: 09/02/23  4:49 AM   Result Value Ref Range    WBC 12.68 (H) 4.31 - 10.16 Thousand/uL    RBC 3.48 (L) 3.81 - 5.12 Million/uL    Hemoglobin 10.6 (L) 11.5 - 15.4 g/dL    Hematocrit 32.5 (L) 34.8 - 46.1 %    MCV 93 82 - 98 fL    MCH 30.5 26.8 - 34.3 pg    MCHC 32.6 31.4 - 37.4 g/dL    RDW 13.7 11.6 - 15.1 %    MPV 10.5 8.9 - 12.7 fL    Platelets 153 474 - 910 Thousands/uL    nRBC 0 /100 WBCs    Neutrophils Relative 74 43 - 75 %    Immat GRANS % 1 0 - 2 %    Lymphocytes Relative 13 (L) 14 - 44 %    Monocytes Relative 11 4 - 12 %    Eosinophils Relative 1 0 - 6 %    Basophils Relative 0 0 - 1 %    Neutrophils Absolute 9.47 (H) 1.85 - 7.62 Thousands/µL    Immature Grans Absolute 0.08 0.00 - 0.20 Thousand/uL    Lymphocytes Absolute 1.63 0.60 - 4.47 Thousands/µL    Monocytes Absolute 1.39 (H) 0.17 - 1.22 Thousand/µL    Eosinophils Absolute 0.08 0.00 - 0.61 Thousand/µL    Basophils Absolute 0.03 0.00 - 0.10 Thousands/µL   Comprehensive metabolic panel    Collection Time: 09/02/23  4:49 AM   Result Value Ref Range    Sodium 132 (L) 135 - 147 mmol/L    Potassium 3.9 3.5 - 5.3 mmol/L    Chloride 102 96 - 108 mmol/L    CO2 24 21 - 32 mmol/L    ANION GAP 6 mmol/L    BUN 14 5 - 25 mg/dL    Creatinine 0.65 0.60 - 1.30 mg/dL    Glucose 280 (H) 65 - 140 mg/dL    Calcium 7.8 (L) 8.4 - 10.2 mg/dL    Corrected Calcium 8.5 8.3 - 10.1 mg/dL    AST 11 (L) 13 - 39 U/L    ALT 19 7 - 52 U/L    Alkaline Phosphatase 56 34 - 104 U/L    Total Protein 6.3 (L) 6.4 - 8.4 g/dL    Albumin 3.1 (L) 3.5 - 5.0 g/dL    Total Bilirubin 0.64 0.20 - 1.00 mg/dL    eGFR 87 ml/min/1.73sq m   Fingerstick Glucose (POCT)    Collection Time: 09/02/23  4:53 AM   Result Value Ref Range    POC Glucose 291 (H) 65 - 140 mg/dl   Fingerstick Glucose (POCT)    Collection Time: 09/02/23 12:16 PM   Result Value Ref Range    POC Glucose 305 (H) 65 - 140 mg/dl   Fingerstick Glucose (POCT)    Collection Time: 09/02/23  4:22 PM   Result Value Ref Range    POC Glucose 407 (H) 65 - 140 mg/dl   Fingerstick Glucose (POCT)    Collection Time: 09/02/23  7:53 PM   Result Value Ref Range    POC Glucose >500 (HH) 65 - 140 mg/dl   Fingerstick Glucose (POCT)    Collection Time: 09/02/23  7:57 PM   Result Value Ref Range    POC Glucose >500 (HH) 65 - 140 mg/dl   Fingerstick Glucose (POCT)    Collection Time: 09/02/23 10:02 PM   Result Value Ref Range    POC Glucose >500 (HH) 65 - 140 mg/dl   Basic metabolic panel    Collection Time: 09/02/23 10:26 PM   Result Value Ref Range    Sodium 132 (L) 135 - 147 mmol/L    Potassium 4.9 3.5 - 5.3 mmol/L    Chloride 102 96 - 108 mmol/L    CO2 22 21 - 32 mmol/L    ANION GAP 8 mmol/L    BUN 21 5 - 25 mg/dL    Creatinine 0.91 0.60 - 1.30 mg/dL    Glucose 564 (HH) 65 - 140 mg/dL    Calcium 8.4 8.4 - 10.2 mg/dL    eGFR 61 ml/min/1.73sq m   Beta Hydroxybutyrate    Collection Time: 09/02/23 10:26 PM   Result Value Ref Range    BETA-HYDROXYBUTYRATE 0.3 <0.6 mmol/L   Blood gas, venous    Collection Time: 09/02/23 11:21 PM   Result Value Ref Range    pH, Norris 7.385 7.300 - 7.400    pCO2, Norris 36.3 (L) 42.0 - 50.0 mm Hg    pO2, Norris 57.2 (H) 35.0 - 45.0 mm Hg    HCO3, Norris 21.2 (L) 24 - 30 mmol/L    Base Excess, Norris -3.3 mmol/L    O2 Content, Norris 14.6 ml/dL    O2 HGB, VENOUS 87.7 (H) 60.0 - 80.0 %   Fingerstick Glucose (POCT)    Collection Time: 09/02/23 11:47 PM   Result Value Ref Range    POC Glucose >500 (HH) 65 - 140 mg/dl   Fingerstick Glucose (POCT)    Collection Time: 09/03/23 12:59 AM   Result Value Ref Range    POC Glucose 488 (H) 65 - 140 mg/dl   Fingerstick Glucose (POCT)    Collection Time: 09/03/23  2:57 AM   Result Value Ref Range    POC Glucose 434 (H) 65 - 140 mg/dl

## 2023-09-03 NOTE — ASSESSMENT & PLAN NOTE
· Patient with right upper quadrant abdominal pain  · Right upper quadrant ultrasound showing acute cholecystitis  · 9/2 cholecystectomy  · General surgery following  · Blood culture x2 no growth at 2 days  · Ceftriaxone and Flagyl  · Continue to monitor

## 2023-09-03 NOTE — ASSESSMENT & PLAN NOTE
Lab Results   Component Value Date    HGBA1C 8.8 (H) 08/31/2023       Recent Labs     09/03/23  0059 09/03/23  0257 09/03/23  0516 09/03/23  0734   POCGLU 488* 434* 319* 201*       Blood Sugar Average: Last 72 hrs:  (P) [id number]     • Chronic, uncontrolled, as evidenced by a hemoglobin A1C of 10.3  • Current elevation in blood sugars likely related to underlying infection  • Hold home oral anti-diabetics  • Patient placed on insulin drip given blood sugars greater than 500  • Continue insulin drip and likely transition to insulin tomorrow  • Hypoglycemia protocol

## 2023-09-03 NOTE — QUICK NOTE
Contacted by nursing that patient BG >500. Ordered 5U IV humulin. Recheck in 1 hour. BG recheck still >500. Will obtain BMP, VBG, Beta hydroxy. Start on insulin infusion. BG checks Q2H.

## 2023-09-04 PROBLEM — D62 ACUTE BLOOD LOSS ANEMIA: Status: ACTIVE | Noted: 2023-09-04

## 2023-09-04 PROBLEM — D64.9 ANEMIA: Status: ACTIVE | Noted: 2023-09-04

## 2023-09-04 LAB
BASOPHILS # BLD AUTO: 0.01 THOUSANDS/ÂΜL (ref 0–0.1)
BASOPHILS NFR BLD AUTO: 0 % (ref 0–1)
EOSINOPHIL # BLD AUTO: 0.12 THOUSAND/ÂΜL (ref 0–0.61)
EOSINOPHIL NFR BLD AUTO: 1 % (ref 0–6)
ERYTHROCYTE [DISTWIDTH] IN BLOOD BY AUTOMATED COUNT: 14.1 % (ref 11.6–15.1)
GLUCOSE SERPL-MCNC: 104 MG/DL (ref 65–140)
GLUCOSE SERPL-MCNC: 144 MG/DL (ref 65–140)
GLUCOSE SERPL-MCNC: 152 MG/DL (ref 65–140)
GLUCOSE SERPL-MCNC: 165 MG/DL (ref 65–140)
GLUCOSE SERPL-MCNC: 203 MG/DL (ref 65–140)
GLUCOSE SERPL-MCNC: 204 MG/DL (ref 65–140)
GLUCOSE SERPL-MCNC: 214 MG/DL (ref 65–140)
GLUCOSE SERPL-MCNC: 284 MG/DL (ref 65–140)
GLUCOSE SERPL-MCNC: 300 MG/DL (ref 65–140)
GLUCOSE SERPL-MCNC: 302 MG/DL (ref 65–140)
GLUCOSE SERPL-MCNC: 339 MG/DL (ref 65–140)
GLUCOSE SERPL-MCNC: 83 MG/DL (ref 65–140)
HCT VFR BLD AUTO: 24.8 % (ref 34.8–46.1)
HGB BLD-MCNC: 8 G/DL (ref 11.5–15.4)
IMM GRANULOCYTES # BLD AUTO: 0.07 THOUSAND/UL (ref 0–0.2)
IMM GRANULOCYTES NFR BLD AUTO: 1 % (ref 0–2)
LYMPHOCYTES # BLD AUTO: 2.04 THOUSANDS/ÂΜL (ref 0.6–4.47)
LYMPHOCYTES NFR BLD AUTO: 22 % (ref 14–44)
MCH RBC QN AUTO: 30.4 PG (ref 26.8–34.3)
MCHC RBC AUTO-ENTMCNC: 32.3 G/DL (ref 31.4–37.4)
MCV RBC AUTO: 94 FL (ref 82–98)
MONOCYTES # BLD AUTO: 0.85 THOUSAND/ÂΜL (ref 0.17–1.22)
MONOCYTES NFR BLD AUTO: 9 % (ref 4–12)
NEUTROPHILS # BLD AUTO: 6.39 THOUSANDS/ÂΜL (ref 1.85–7.62)
NEUTS SEG NFR BLD AUTO: 67 % (ref 43–75)
NRBC BLD AUTO-RTO: 0 /100 WBCS
PLATELET # BLD AUTO: 221 THOUSANDS/UL (ref 149–390)
PMV BLD AUTO: 9.8 FL (ref 8.9–12.7)
RBC # BLD AUTO: 2.63 MILLION/UL (ref 3.81–5.12)
WBC # BLD AUTO: 9.48 THOUSAND/UL (ref 4.31–10.16)

## 2023-09-04 PROCEDURE — C9113 INJ PANTOPRAZOLE SODIUM, VIA: HCPCS

## 2023-09-04 PROCEDURE — 99233 SBSQ HOSP IP/OBS HIGH 50: CPT | Performed by: INTERNAL MEDICINE

## 2023-09-04 PROCEDURE — 85025 COMPLETE CBC W/AUTO DIFF WBC: CPT | Performed by: INTERNAL MEDICINE

## 2023-09-04 PROCEDURE — 82948 REAGENT STRIP/BLOOD GLUCOSE: CPT

## 2023-09-04 RX ORDER — INSULIN LISPRO 100 [IU]/ML
1-6 INJECTION, SOLUTION INTRAVENOUS; SUBCUTANEOUS
Status: DISCONTINUED | OUTPATIENT
Start: 2023-09-04 | End: 2023-09-07 | Stop reason: HOSPADM

## 2023-09-04 RX ORDER — INSULIN GLARGINE 100 [IU]/ML
18 INJECTION, SOLUTION SUBCUTANEOUS EVERY MORNING
Status: DISCONTINUED | OUTPATIENT
Start: 2023-09-04 | End: 2023-09-05

## 2023-09-04 RX ORDER — INSULIN LISPRO 100 [IU]/ML
1-5 INJECTION, SOLUTION INTRAVENOUS; SUBCUTANEOUS
Status: DISCONTINUED | OUTPATIENT
Start: 2023-09-04 | End: 2023-09-07 | Stop reason: HOSPADM

## 2023-09-04 RX ADMIN — INSULIN LISPRO 2 UNITS: 100 INJECTION, SOLUTION INTRAVENOUS; SUBCUTANEOUS at 13:00

## 2023-09-04 RX ADMIN — METRONIDAZOLE 500 MG: 500 INJECTION, SOLUTION INTRAVENOUS at 21:30

## 2023-09-04 RX ADMIN — HEPARIN SODIUM 5000 UNITS: 5000 INJECTION INTRAVENOUS; SUBCUTANEOUS at 05:21

## 2023-09-04 RX ADMIN — HYDROMORPHONE HYDROCHLORIDE 0.5 MG: 1 INJECTION, SOLUTION INTRAMUSCULAR; INTRAVENOUS; SUBCUTANEOUS at 08:21

## 2023-09-04 RX ADMIN — INSULIN LISPRO 4 UNITS: 100 INJECTION, SOLUTION INTRAVENOUS; SUBCUTANEOUS at 22:41

## 2023-09-04 RX ADMIN — OXYCODONE HYDROCHLORIDE 5 MG: 5 TABLET ORAL at 16:51

## 2023-09-04 RX ADMIN — INSULIN GLARGINE 18 UNITS: 100 INJECTION, SOLUTION SUBCUTANEOUS at 08:26

## 2023-09-04 RX ADMIN — OXYCODONE HYDROCHLORIDE 5 MG: 5 TABLET ORAL at 12:57

## 2023-09-04 RX ADMIN — PANTOPRAZOLE SODIUM 40 MG: 40 INJECTION, POWDER, FOR SOLUTION INTRAVENOUS at 08:20

## 2023-09-04 RX ADMIN — INSULIN LISPRO 4 UNITS: 100 INJECTION, SOLUTION INTRAVENOUS; SUBCUTANEOUS at 18:44

## 2023-09-04 RX ADMIN — HEPARIN SODIUM 5000 UNITS: 5000 INJECTION INTRAVENOUS; SUBCUTANEOUS at 15:23

## 2023-09-04 RX ADMIN — PANTOPRAZOLE SODIUM 40 MG: 40 INJECTION, POWDER, FOR SOLUTION INTRAVENOUS at 21:36

## 2023-09-04 RX ADMIN — HEPARIN SODIUM 5000 UNITS: 5000 INJECTION INTRAVENOUS; SUBCUTANEOUS at 21:36

## 2023-09-04 RX ADMIN — METRONIDAZOLE 500 MG: 500 INJECTION, SOLUTION INTRAVENOUS at 14:08

## 2023-09-04 RX ADMIN — AMLODIPINE BESYLATE 5 MG: 5 TABLET ORAL at 22:24

## 2023-09-04 RX ADMIN — TEMAZEPAM 30 MG: 15 CAPSULE ORAL at 22:24

## 2023-09-04 RX ADMIN — SODIUM CHLORIDE 8 UNITS/HR: 9 INJECTION, SOLUTION INTRAVENOUS at 00:43

## 2023-09-04 RX ADMIN — ASPIRIN 81 MG: 81 TABLET, COATED ORAL at 08:26

## 2023-09-04 RX ADMIN — CEFTRIAXONE 1000 MG: 1 INJECTION, SOLUTION INTRAVENOUS at 12:59

## 2023-09-04 RX ADMIN — METRONIDAZOLE 500 MG: 500 INJECTION, SOLUTION INTRAVENOUS at 05:21

## 2023-09-04 NOTE — ASSESSMENT & PLAN NOTE
· Patient's hemoglobin slowly downtrending at this time  · Hemoglobin 8 currently  · May be postprocedural  · No outward signs of bleeding including hematuria, hematochezia, or melena  · Continue to monitor

## 2023-09-04 NOTE — PLAN OF CARE
Problem: GASTROINTESTINAL - ADULT  Goal: Minimal or absence of nausea and/or vomiting  Description: INTERVENTIONS:  - Administer IV fluids if ordered to ensure adequate hydration  - Maintain NPO status until nausea and vomiting are resolved  - Nasogastric tube if ordered  - Administer ordered antiemetic medications as needed  - Provide nonpharmacologic comfort measures as appropriate  - Advance diet as tolerated, if ordered  - Consider nutrition services referral to assist patient with adequate nutrition and appropriate food choices  Outcome: Progressing     Problem: METABOLIC, FLUID AND ELECTROLYTES - ADULT  Goal: Glucose maintained within target range  Description: INTERVENTIONS:  - Monitor Blood Glucose as ordered  - Assess for signs and symptoms of hyperglycemia and hypoglycemia  - Administer ordered medications to maintain glucose within target range  - Assess nutritional intake and initiate nutrition service referral as needed  Outcome: Progressing

## 2023-09-04 NOTE — PROGRESS NOTES
Patient c/o abdominal RUQ and LLQ pain. Abdomen distended. Bladder scanned for 333 ml of urine. RN to recheck. 1710: Patient bladder canned for 636. Family expressed concern  Regarding the patient being straight cath due to its discomfort; so they asked if she could eat diner first, try to void again before making a determination to straight cath. Family educated about the urinary retention protocol, bladder scanning process  RN to re-assess patient after dinner. 1 RN colleague assisted  Patient to the restroom. Patient  voided 200 ml plus an additional amount of unmeasured urine (patient removed the hat reportedly to have a bm). 1945:  Bladder scanned at the end of the shift during bed side shift report patient noted to have ~400 ml of urine. Family and patient made aware patient will be given an opportunity to void again this evening before assessing the need to be straight cath. Family requested that they be called if the patient needs to be straight cath due to urinary retention.

## 2023-09-04 NOTE — PROGRESS NOTES
[address]  Progress Note  Name: Quinn Masterson I  MRN: [id number]  Unit/Bed#: -21 I Date of Admission: 8/30/2023   Date of Service: 9/4/2023 I Hospital Day: 4    Assessment/Plan   * Acute cholecystitis  Assessment & Plan  · Patient with right upper quadrant abdominal pain  · Right upper quadrant ultrasound showing acute cholecystitis  · 9/2 cholecystectomy  · General surgery following  · Blood culture x2 no growth at 2 days  · Ceftriaxone and Flagyl for total 5 days  · Continue to monitor    Acute blood loss anemia  Assessment & Plan  · Patient's hemoglobin slowly downtrending at this time  · Hemoglobin 8 currently  · May be postprocedural  · No outward signs of bleeding including hematuria, hematochezia, or melena  · Continue to monitor    Diabetes [facility])  Assessment & Plan  Lab Results   Component Value Date    HGBA1C 8.8 (H) 08/31/2023       Recent Labs     09/04/23  0211 09/04/23  0424 09/04/23  0638 09/04/23  0804   POCGLU 165* 152* 104 83       Blood Sugar Average: Last 72 hrs:  (P) 259.08     • Chronic, uncontrolled, as evidenced by a hemoglobin A1C of 10.3  • Patient will be started on Lantus 18 units and insulin drip will be discontinued.   Patient required large amount of insulin over the past 24 hours and likely will need insulin upon discharge  • Insulin drip discontinued  • Correctional scale insulin  • Continue to monitor and adjust as needed  • Hypoglycemia protocol    Sepsis ([address])  Assessment & Plan  · Plan as above     Elevated d-dimer  Assessment & Plan  · Present on admission, evidenced by a d-dimer of 1.42  · Elevated D-dimer may be secondary to underlying infection  · Unable to have a CTA Chest in setting of allergy to Iodine  · V/Q scan low probability  · Lower extremity Doppler negative for any DVT    Hypertension  Assessment & Plan  · Chronic  · Blood pressure currently controlled  · Continue home amlodipine and metoprolol  · Monitor vital signs closely             VTE Pharmacologic Prophylaxis: VTE Score: 6 Moderate Risk (Score 3-4) - Pharmacological DVT Prophylaxis Ordered: heparin. Patient Centered Rounds: I performed bedside rounds with nursing staff today. Discussions with Specialists or Other Care Team Provider: Case management    Education and Discussions with Family / Patient: Updated  (son) via phone. Total Time Spent on Date of Encounter in care of patient: 42 minutes This time was spent on one or more of the following: performing physical exam; counseling and coordination of care; obtaining or reviewing history; documenting in the medical record; reviewing/ordering tests, medications or procedures; communicating with other healthcare professionals and discussing with patient's family/caregivers. Current Length of Stay: 4 day(s)  Current Patient Status: Inpatient   Certification Statement: The patient will continue to require additional inpatient hospital stay due to Hyperglycemia and abdominal pain  Discharge Plan: Anticipate discharge in 24-48 hrs to home. Code Status: Level 1 - Full Code    Subjective:   Patient complaining of abdominal pain on examination. Patient had no overnight events. Patient had no other complaints on exam.    Objective:     Vitals:   Temp (24hrs), Av.4 °F (36.9 °C), Min:98.3 °F (36.8 °C), Max:98.4 °F (36.9 °C)    Temp:  [98.3 °F (36.8 °C)-98.4 °F (36.9 °C)] 98.3 °F (36.8 °C)  HR:  [66-72] 72  Resp:  [20] 20  BP: (107-131)/(52-65) 131/64  SpO2:  [91 %-95 %] 92 %  Body mass index is 26.22 kg/m². Input and Output Summary (last 24 hours):   No intake or output data in the 24 hours ending 23 0859    Physical Exam:   Physical Exam  Vitals and nursing note reviewed. Constitutional:       General: She is not in acute distress. Appearance: She is well-developed. HENT:      Head: Normocephalic and atraumatic. Eyes:      General: No scleral icterus.      Conjunctiva/sclera: Conjunctivae normal.   Cardiovascular:      Rate and Rhythm: Normal rate and regular rhythm. Heart sounds: Normal heart sounds. No murmur heard. No friction rub. No gallop. Pulmonary:      Effort: Pulmonary effort is normal. No respiratory distress. Breath sounds: Normal breath sounds. No wheezing or rales. Abdominal:      General: Bowel sounds are normal. There is no distension. Palpations: Abdomen is soft. Tenderness: There is abdominal tenderness. Musculoskeletal:         General: Normal range of motion. Skin:     General: Skin is warm. Findings: No rash. Neurological:      Mental Status: She is alert and oriented to person, place, and time. Additional Data:     Labs:  Results from last 7 days   Lab Units 09/04/23  0528   WBC Thousand/uL 9.48   HEMOGLOBIN g/dL 8.0*   HEMATOCRIT % 24.8*   PLATELETS Thousands/uL 221   NEUTROS PCT % 67   LYMPHS PCT % 22   MONOS PCT % 9   EOS PCT % 1     Results from last 7 days   Lab Units 09/03/23  0518 09/02/23  2226 09/02/23  0449   SODIUM mmol/L 134*   < > 132*   POTASSIUM mmol/L 3.8   < > 3.9   CHLORIDE mmol/L 105   < > 102   CO2 mmol/L 23   < > 24   BUN mg/dL 21   < > 14   CREATININE mg/dL 0.77   < > 0.65   ANION GAP mmol/L 6   < > 6   CALCIUM mg/dL 8.5   < > 7.8*   ALBUMIN g/dL  --   --  3.1*   TOTAL BILIRUBIN mg/dL  --   --  0.64   ALK PHOS U/L  --   --  56   ALT U/L  --   --  19   AST U/L  --   --  11*   GLUCOSE RANDOM mg/dL 345*   < > 280*    < > = values in this interval not displayed.          Results from last 7 days   Lab Units 09/04/23  0804 09/04/23  4017 09/04/23  0424 09/04/23  0211 09/04/23  0023 09/03/23  2204 09/03/23  2001 09/03/23  1827 09/03/23  1606 09/03/23  1423 09/03/23  1200 09/03/23  1016   POC GLUCOSE mg/dl 83 104 152* 165* 214* 144* 149* 176* 196* 243* 247* 255*     Results from last 7 days   Lab Units 08/31/23  0540   HEMOGLOBIN A1C % 8.8*     Results from last 7 days   Lab Units 08/30/23  1000   LACTIC ACID mmol/L 1.5       Lines/Drains:  Invasive Devices     Peripheral Intravenous Line  Duration           Peripheral IV 09/03/23 Dorsal (posterior); Left Forearm 1 day    Peripheral IV 09/03/23 Dorsal (posterior); Right Hand 1 day                      Imaging: No pertinent imaging reviewed. Recent Cultures (last 7 days):   Results from last 7 days   Lab Units 09/01/23  0925   BLOOD CULTURE  No Growth at 48 hrs. No Growth at 48 hrs. Last 24 Hours Medication List:   Current Facility-Administered Medications   Medication Dose Route Frequency Provider Last Rate   • acetaminophen  650 mg Oral Q6H PRN Angelia Alvarado PA-C     • amLODIPine  5 mg Oral HS Angelia Kilmichael [place]     • aspirin  81 mg Oral Daily [place], [place]     • cefTRIAXone  1,000 mg Intravenous Q24H Wanda Araujo, DO 1,000 mg (09/03/23 1337)   • heparin (porcine)  5,000 Units Subcutaneous [facility] [place]     • hydrALAZINE  5 mg Intravenous Q6H PRN Cindy Nur PA-C     • HYDROmorphone  0.5 mg Intravenous Q4H PRN Wanda Araujo, DO     • insulin glargine  18 Units Subcutaneous QAM Wanda Araujo DO     • insulin lispro  1-5 Units Subcutaneous HS Wanda Araujo, DO     • insulin lispro  1-6 Units Subcutaneous TID AC Wanda Estebannet, DO     • metroNIDAZOLE  500 mg Intravenous Q8H Wanda Araujo,  mg (09/04/23 8879)   • oxyCODONE  5 mg Oral Q4H PRN Angelia Alvarado PA-C     • oxyCODONE  2.5 mg Oral Q4H PRN Angelia Alvarado PA-C     • pantoprazole  40 mg Intravenous Q12H [address] [place], [place]     • temazepam  30 mg Oral HS PRN Angelia Alvarado PA-C          Today, Patient Was Seen By: Wanda Araujo DO    **Please Note: This note may have been constructed using a voice recognition system. **

## 2023-09-05 LAB
ANION GAP SERPL CALCULATED.3IONS-SCNC: 5 MMOL/L
BASOPHILS # BLD AUTO: 0.04 THOUSANDS/ÂΜL (ref 0–0.1)
BASOPHILS NFR BLD AUTO: 0 % (ref 0–1)
BUN SERPL-MCNC: 13 MG/DL (ref 5–25)
CALCIUM SERPL-MCNC: 8.5 MG/DL (ref 8.4–10.2)
CHLORIDE SERPL-SCNC: 103 MMOL/L (ref 96–108)
CO2 SERPL-SCNC: 26 MMOL/L (ref 21–32)
CREAT SERPL-MCNC: 0.58 MG/DL (ref 0.6–1.3)
EOSINOPHIL # BLD AUTO: 0.42 THOUSAND/ÂΜL (ref 0–0.61)
EOSINOPHIL NFR BLD AUTO: 5 % (ref 0–6)
ERYTHROCYTE [DISTWIDTH] IN BLOOD BY AUTOMATED COUNT: 14.1 % (ref 11.6–15.1)
GFR SERPL CREATININE-BSD FRML MDRD: 90 ML/MIN/1.73SQ M
GLUCOSE SERPL-MCNC: 215 MG/DL (ref 65–140)
GLUCOSE SERPL-MCNC: 226 MG/DL (ref 65–140)
GLUCOSE SERPL-MCNC: 233 MG/DL (ref 65–140)
GLUCOSE SERPL-MCNC: 239 MG/DL (ref 65–140)
GLUCOSE SERPL-MCNC: 250 MG/DL (ref 65–140)
GLUCOSE SERPL-MCNC: 278 MG/DL (ref 65–140)
HCT VFR BLD AUTO: 30.3 % (ref 34.8–46.1)
HGB BLD-MCNC: 10.2 G/DL (ref 11.5–15.4)
IMM GRANULOCYTES # BLD AUTO: 0.2 THOUSAND/UL (ref 0–0.2)
IMM GRANULOCYTES NFR BLD AUTO: 2 % (ref 0–2)
LYMPHOCYTES # BLD AUTO: 2.38 THOUSANDS/ÂΜL (ref 0.6–4.47)
LYMPHOCYTES NFR BLD AUTO: 27 % (ref 14–44)
MCH RBC QN AUTO: 31 PG (ref 26.8–34.3)
MCHC RBC AUTO-ENTMCNC: 33.7 G/DL (ref 31.4–37.4)
MCV RBC AUTO: 92 FL (ref 82–98)
MONOCYTES # BLD AUTO: 0.87 THOUSAND/ÂΜL (ref 0.17–1.22)
MONOCYTES NFR BLD AUTO: 10 % (ref 4–12)
NEUTROPHILS # BLD AUTO: 5.04 THOUSANDS/ÂΜL (ref 1.85–7.62)
NEUTS SEG NFR BLD AUTO: 56 % (ref 43–75)
NRBC BLD AUTO-RTO: 0 /100 WBCS
PLATELET # BLD AUTO: 303 THOUSANDS/UL (ref 149–390)
PMV BLD AUTO: 9.8 FL (ref 8.9–12.7)
POTASSIUM SERPL-SCNC: 4.3 MMOL/L (ref 3.5–5.3)
RBC # BLD AUTO: 3.29 MILLION/UL (ref 3.81–5.12)
SODIUM SERPL-SCNC: 134 MMOL/L (ref 135–147)
WBC # BLD AUTO: 8.95 THOUSAND/UL (ref 4.31–10.16)

## 2023-09-05 PROCEDURE — 85025 COMPLETE CBC W/AUTO DIFF WBC: CPT | Performed by: INTERNAL MEDICINE

## 2023-09-05 PROCEDURE — 80048 BASIC METABOLIC PNL TOTAL CA: CPT | Performed by: INTERNAL MEDICINE

## 2023-09-05 PROCEDURE — 82948 REAGENT STRIP/BLOOD GLUCOSE: CPT

## 2023-09-05 PROCEDURE — C9113 INJ PANTOPRAZOLE SODIUM, VIA: HCPCS

## 2023-09-05 PROCEDURE — 99233 SBSQ HOSP IP/OBS HIGH 50: CPT | Performed by: FAMILY MEDICINE

## 2023-09-05 RX ORDER — INSULIN GLARGINE 100 [IU]/ML
25 INJECTION, SOLUTION SUBCUTANEOUS EVERY MORNING
Status: DISCONTINUED | OUTPATIENT
Start: 2023-09-06 | End: 2023-09-07 | Stop reason: HOSPADM

## 2023-09-05 RX ORDER — SODIUM CHLORIDE, SODIUM GLUCONATE, SODIUM ACETATE, POTASSIUM CHLORIDE, MAGNESIUM CHLORIDE, SODIUM PHOSPHATE, DIBASIC, AND POTASSIUM PHOSPHATE .53; .5; .37; .037; .03; .012; .00082 G/100ML; G/100ML; G/100ML; G/100ML; G/100ML; G/100ML; G/100ML
75 INJECTION, SOLUTION INTRAVENOUS CONTINUOUS
Status: DISCONTINUED | OUTPATIENT
Start: 2023-09-05 | End: 2023-09-07 | Stop reason: HOSPADM

## 2023-09-05 RX ORDER — ONDANSETRON 2 MG/ML
4 INJECTION INTRAMUSCULAR; INTRAVENOUS EVERY 6 HOURS PRN
Status: DISCONTINUED | OUTPATIENT
Start: 2023-09-05 | End: 2023-09-07 | Stop reason: HOSPADM

## 2023-09-05 RX ORDER — INSULIN LISPRO 100 [IU]/ML
5 INJECTION, SOLUTION INTRAVENOUS; SUBCUTANEOUS
Status: DISCONTINUED | OUTPATIENT
Start: 2023-09-05 | End: 2023-09-07 | Stop reason: HOSPADM

## 2023-09-05 RX ADMIN — ONDANSETRON 4 MG: 2 INJECTION INTRAMUSCULAR; INTRAVENOUS at 22:00

## 2023-09-05 RX ADMIN — HEPARIN SODIUM 5000 UNITS: 5000 INJECTION INTRAVENOUS; SUBCUTANEOUS at 22:00

## 2023-09-05 RX ADMIN — HEPARIN SODIUM 5000 UNITS: 5000 INJECTION INTRAVENOUS; SUBCUTANEOUS at 13:46

## 2023-09-05 RX ADMIN — INSULIN LISPRO 3 UNITS: 100 INJECTION, SOLUTION INTRAVENOUS; SUBCUTANEOUS at 18:02

## 2023-09-05 RX ADMIN — TEMAZEPAM 30 MG: 15 CAPSULE ORAL at 22:00

## 2023-09-05 RX ADMIN — SODIUM CHLORIDE, SODIUM GLUCONATE, SODIUM ACETATE, POTASSIUM CHLORIDE, MAGNESIUM CHLORIDE, SODIUM PHOSPHATE, DIBASIC, AND POTASSIUM PHOSPHATE 75 ML/HR: .53; .5; .37; .037; .03; .012; .00082 INJECTION, SOLUTION INTRAVENOUS at 15:29

## 2023-09-05 RX ADMIN — METRONIDAZOLE 500 MG: 500 INJECTION, SOLUTION INTRAVENOUS at 12:04

## 2023-09-05 RX ADMIN — CEFTRIAXONE 1000 MG: 1 INJECTION, SOLUTION INTRAVENOUS at 13:46

## 2023-09-05 RX ADMIN — ASPIRIN 81 MG: 81 TABLET, COATED ORAL at 09:43

## 2023-09-05 RX ADMIN — AMLODIPINE BESYLATE 5 MG: 5 TABLET ORAL at 22:19

## 2023-09-05 RX ADMIN — ONDANSETRON 4 MG: 2 INJECTION INTRAMUSCULAR; INTRAVENOUS at 15:28

## 2023-09-05 RX ADMIN — PANTOPRAZOLE SODIUM 40 MG: 40 INJECTION, POWDER, FOR SOLUTION INTRAVENOUS at 09:43

## 2023-09-05 RX ADMIN — PANTOPRAZOLE SODIUM 40 MG: 40 INJECTION, POWDER, FOR SOLUTION INTRAVENOUS at 22:00

## 2023-09-05 RX ADMIN — INSULIN LISPRO 5 UNITS: 100 INJECTION, SOLUTION INTRAVENOUS; SUBCUTANEOUS at 18:02

## 2023-09-05 RX ADMIN — METRONIDAZOLE 500 MG: 500 INJECTION, SOLUTION INTRAVENOUS at 22:00

## 2023-09-05 RX ADMIN — INSULIN GLARGINE 18 UNITS: 100 INJECTION, SOLUTION SUBCUTANEOUS at 09:43

## 2023-09-05 RX ADMIN — OXYCODONE HYDROCHLORIDE 5 MG: 5 TABLET ORAL at 05:54

## 2023-09-05 RX ADMIN — INSULIN LISPRO 2 UNITS: 100 INJECTION, SOLUTION INTRAVENOUS; SUBCUTANEOUS at 07:46

## 2023-09-05 RX ADMIN — INSULIN LISPRO 1 UNITS: 100 INJECTION, SOLUTION INTRAVENOUS; SUBCUTANEOUS at 22:40

## 2023-09-05 RX ADMIN — HEPARIN SODIUM 5000 UNITS: 5000 INJECTION INTRAVENOUS; SUBCUTANEOUS at 05:54

## 2023-09-05 RX ADMIN — METRONIDAZOLE 500 MG: 500 INJECTION, SOLUTION INTRAVENOUS at 05:55

## 2023-09-05 RX ADMIN — ACETAMINOPHEN 650 MG: 325 TABLET, FILM COATED ORAL at 22:00

## 2023-09-05 RX ADMIN — INSULIN LISPRO 3 UNITS: 100 INJECTION, SOLUTION INTRAVENOUS; SUBCUTANEOUS at 12:04

## 2023-09-05 NOTE — PLAN OF CARE
Problem: SAFETY ADULT  Goal: Patient will remain free of falls  Description: INTERVENTIONS:  - Educate patient/family on patient safety including physical limitations  - Instruct patient to call for assistance with activity   - Consult OT/PT to assist with strengthening/mobility   - Keep Call bell within reach  - Keep bed low and locked with side rails adjusted as appropriate  - Keep care items and personal belongings within reach  - Initiate and maintain comfort rounds  - Make Fall Risk Sign visible to staff  - Apply yellow socks and bracelet for high fall risk patients  - Consider moving patient to room near nurses station  Outcome: Progressing     Problem: SAFETY ADULT  Goal: Maintain or return to baseline ADL function  Description: INTERVENTIONS:  -  Assess patient's ability to carry out ADLs; assess patient's baseline for ADL function and identify physical deficits which impact ability to perform ADLs (bathing, care of mouth/teeth, toileting, grooming, dressing, etc.)  - Assess/evaluate cause of self-care deficits   - Assess range of motion  - Assess patient's mobility; develop plan if impaired  - Assess patient's need for assistive devices and provide as appropriate  - Encourage maximum independence but intervene and supervise when necessary  - Involve family in performance of ADLs  - Assess for home care needs following discharge   - Consider OT consult to assist with ADL evaluation and planning for discharge  - Provide patient education as appropriate  Outcome: Progressing

## 2023-09-05 NOTE — PROGRESS NOTES
[address]  Progress Note  Name: Darci Hedrick I  MRN: [id number]  Unit/Bed#: -89 I Date of Admission: 8/30/2023   Date of Service: 9/5/2023 I Hospital Day: 5    Assessment/Plan   * Acute cholecystitis  Assessment & Plan  · Patient with right upper quadrant abdominal pain  · Right upper quadrant ultrasound showing acute cholecystitis  · 9/2 cholecystectomy  · General surgery following  · Blood culture x2 no growth at 2 days  · Ceftriaxone and Flagyl for total 5 days, last day today  · Continue to monitor    Acute blood loss anemia  Assessment & Plan  · Patient's hemoglobin slowly downtrending at this time  · Hemoglobin 8 currently  · May be postprocedural  · No outward signs of bleeding including hematuria, hematochezia, or melena  · Continue to monitor    Sepsis [facility])  Assessment & Plan  · Plan as above     Elevated d-dimer  Assessment & Plan  · Present on admission, evidenced by a d-dimer of 1.42  · Elevated D-dimer may be secondary to underlying infection  · Unable to have a CTA Chest in setting of allergy to Iodine  · V/Q scan low probability  · Lower extremity Doppler negative for any DVT    Hypertension  Assessment & Plan  · Chronic  · Blood pressure currently controlled  · Continue home amlodipine and metoprolol  · Monitor vital signs closely    Diabetes [facility])  Assessment & Plan  Lab Results   Component Value Date    HGBA1C 8.8 (H) 08/31/2023       Recent Labs     09/04/23  2241 09/05/23  0736 09/05/23  1049 09/05/23  1216   POCGLU 339* 226* 250* 278*       Blood Sugar Average: Last 72 hrs:  (P) 249.4934054417608045     • Chronic, uncontrolled, as evidenced by a hemoglobin A1C of 10.3  • Insulin drip discontinued  • Increase Lantus to 25 units, add humalog 5 units TID   • Correctional scale insulin  • Continue to monitor and adjust as needed  • Hypoglycemia protocol  • likely will need home insulin Rx on discharge         VTE Pharmacologic Prophylaxis:   Pharmacologic: Heparin  Mechanical VTE Prophylaxis in Place: Yes    Patient Centered Rounds: I have performed bedside rounds with nursing staff today. Current Length of Stay: 5 day(s)    Current Patient Status: Inpatient   Certification Statement: The patient will continue to require additional inpatient hospital stay due to abdominal pain ongoing    Discharge Plan: 24 hours hopefully    Code Status: Level 1 - Full Code      Subjective:   Still complaining for lot of abdominal pain, unable to tolerate food. Very poor apetites. Will add supplement    Objective:     Vitals:   Temp (24hrs), Av.1 °F (36.7 °C), Min:97.9 °F (36.6 °C), Max:98.2 °F (36.8 °C)    Temp:  [97.9 °F (36.6 °C)-98.2 °F (36.8 °C)] 98 °F (36.7 °C)  HR:  [64-69] 64  Resp:  [17] 17  BP: (130-147)/(65-69) 147/69  SpO2:  [94 %-95 %] 94 %  Body mass index is 26.22 kg/m². Input and Output Summary (last 24 hours): Intake/Output Summary (Last 24 hours) at 2023 1402  Last data filed at 2023 1221  Gross per 24 hour   Intake 120 ml   Output 1400 ml   Net -1280 ml       Physical Exam:     Physical Exam  Constitutional:       Appearance: She is well-developed. HENT:      Head: Normocephalic and atraumatic. Pulmonary:      Effort: Pulmonary effort is normal. No respiratory distress. Breath sounds: Normal breath sounds. Abdominal:      Tenderness: There is abdominal tenderness. Musculoskeletal:      Cervical back: Normal range of motion. Skin:     General: Skin is warm and dry.             Additional Data:     Labs:    Results from last 7 days   Lab Units 23  0445   WBC Thousand/uL 8.95   HEMOGLOBIN g/dL 10.2*   HEMATOCRIT % 30.3*   PLATELETS Thousands/uL 303   NEUTROS PCT % 56   LYMPHS PCT % 27   MONOS PCT % 10   EOS PCT % 5     Results from last 7 days   Lab Units 23  0445 23  2226 23  0449   SODIUM mmol/L 134*   < > 132*   POTASSIUM mmol/L 4.3   < > 3.9   CHLORIDE mmol/L 103   < > 102   CO2 mmol/L 26   < > 24 BUN mg/dL 13   < > 14   CREATININE mg/dL 0.58*   < > 0.65   ANION GAP mmol/L 5   < > 6   CALCIUM mg/dL 8.5   < > 7.8*   ALBUMIN g/dL  --   --  3.1*   TOTAL BILIRUBIN mg/dL  --   --  0.64   ALK PHOS U/L  --   --  56   ALT U/L  --   --  19   AST U/L  --   --  11*   GLUCOSE RANDOM mg/dL 239*   < > 280*    < > = values in this interval not displayed. Results from last 7 days   Lab Units 09/05/23  1216 09/05/23  1049 09/05/23  0736 09/04/23  2241 09/04/23  1920 09/04/23  1612 09/04/23  1417 09/04/23  1157 09/04/23  1103 09/04/23  0804 09/04/23  0638 09/04/23  0424   POC GLUCOSE mg/dl 278* 250* 226* 339* 302* 284* 300* 203* 204* 83 104 152*     Results from last 7 days   Lab Units 08/31/23  0540   HEMOGLOBIN A1C % 8.8*     Results from last 7 days   Lab Units 08/30/23  1000   LACTIC ACID mmol/L 1.5        Recent Cultures (last 7 days):     Results from last 7 days   Lab Units 09/01/23  0925   BLOOD CULTURE  No Growth After 4 Days. No Growth After 4 Days.        Last 24 Hours Medication List:   Current Facility-Administered Medications   Medication Dose Route Frequency Provider Last Rate   • acetaminophen  650 mg Oral Q6H PRN Jasmine Galvez PA-C     • amLODIPine  5 mg Oral HS Machelle Izquierdo     • aspirin  81 mg Oral Daily Buzz Marks [place], [place]     • cefTRIAXone  1,000 mg Intravenous Q24H Sophie Bustillo DO 1,000 mg (09/05/23 1346)   • heparin (porcine)  5,000 Units Subcutaneous [facility]     • hydrALAZINE  5 mg Intravenous Q6H PRN Jasmine Galvez PA-C     • HYDROmorphone  0.5 mg Intravenous Q4H PRN Sophie Bustillo DO     • [START ON 9/6/2023] insulin glargine  25 Units Subcutaneous PEGGY Jennings MD     • insulin lispro  1-5 Units Subcutaneous HS Sophie Bustillo DO     • insulin lispro  1-6 Units Subcutaneous TID AC Sophie Bustillo DO     • insulin lispro  5 Units Subcutaneous TID With Meals Deirdre Jennings MD     • metroNIDAZOLE  500 mg Intravenous Q8H Tomma Crea,  mg (09/05/23 1204)   • oxyCODONE  5 mg Oral Q4H PRN Stephanie Cardona PA-C     • oxyCODONE  2.5 mg Oral Q4H PRN Machelle Mirza     • pantoprazole  40 mg Intravenous Q12H [address] [place], [place]     • temazepam  30 mg Oral HS PRN Stephanie Cardona PA-C          Today, Patient Was Seen By: Peterson Cantu MD    ** Please Note: Dictation voice to text software may have been used in the creation of this document.  **

## 2023-09-05 NOTE — ASSESSMENT & PLAN NOTE
· Patient with right upper quadrant abdominal pain  · Right upper quadrant ultrasound showing acute cholecystitis  · 9/2 cholecystectomy  · General surgery following  · Blood culture x2 no growth at 2 days  · Ceftriaxone and Flagyl for total 5 days, last day today  · Continue to monitor

## 2023-09-05 NOTE — PROGRESS NOTES
Post void bladder scan showed 510cc. Pt on urinary retention protocol but wanted to discuss straight cath with son Clif. Educated pt importance of fully emptying bladder. Pt decided she'd like to sit in chair, eat breakfast, then try to void again. AM nurse made aware of plan.

## 2023-09-05 NOTE — ASSESSMENT & PLAN NOTE
Lab Results   Component Value Date    HGBA1C 8.8 (H) 08/31/2023       Recent Labs     09/04/23  2241 09/05/23  0736 09/05/23  1049 09/05/23  1216   POCGLU 339* 226* 250* 278*       Blood Sugar Average: Last 72 hrs:  (P) 249.8180648547154855     • Chronic, uncontrolled, as evidenced by a hemoglobin A1C of 10.3  • Insulin drip discontinued  • Increase Lantus to 25 units, add humalog 5 units TID   • Correctional scale insulin  • Continue to monitor and adjust as needed  • Hypoglycemia protocol  • likely will need home insulin Rx on discharge

## 2023-09-05 NOTE — PLAN OF CARE
Problem: GASTROINTESTINAL - ADULT  Goal: Minimal or absence of nausea and/or vomiting  Description: INTERVENTIONS:  - Administer IV fluids if ordered to ensure adequate hydration  - Maintain NPO status until nausea and vomiting are resolved  - Nasogastric tube if ordered  - Administer ordered antiemetic medications as needed  - Provide nonpharmacologic comfort measures as appropriate  - Advance diet as tolerated, if ordered  - Consider nutrition services referral to assist patient with adequate nutrition and appropriate food choices  Outcome: Progressing     Problem: Nutrition/Hydration-ADULT  Goal: Nutrient/Hydration intake appropriate for improving, restoring or maintaining nutritional needs  Description: Monitor and assess patient's nutrition/hydration status for malnutrition. Collaborate with interdisciplinary team and initiate plan and interventions as ordered. Monitor patient's weight and dietary intake as ordered or per policy. Utilize nutrition screening tool and intervene as necessary. Determine patient's food preferences and provide high-protein, high-caloric foods as appropriate.      INTERVENTIONS:  - Monitor oral intake, urinary output, labs, and treatment plans  - Assess nutrition and hydration status and recommend course of action  - Evaluate amount of meals eaten  - Assist patient with eating if necessary   - Allow adequate time for meals  - Recommend/ encourage appropriate diets, oral nutritional supplements, and vitamin/mineral supplements  - Order, calculate, and assess calorie counts as needed  - Recommend, monitor, and adjust tube feedings and TPN/PPN based on assessed needs  - Assess need for intravenous fluids  - Provide specific nutrition/hydration education as appropriate  - Include patient/family/caregiver in decisions related to nutrition  Outcome: Progressing     Problem: Knowledge Deficit  Goal: Patient/family/caregiver demonstrates understanding of disease process, treatment plan, medications, and discharge instructions  Description: Complete learning assessment and assess knowledge base.   Interventions:  - Provide teaching at level of understanding  - Provide teaching via preferred learning methods  Outcome: Progressing     Problem: GENITOURINARY - ADULT  Goal: Maintains or returns to baseline urinary function  Description: INTERVENTIONS:  - Assess urinary function  - Encourage oral fluids to ensure adequate hydration if ordered  - Administer IV fluids as ordered to ensure adequate hydration  - Administer ordered medications as needed  - Offer frequent toileting  - Follow urinary retention protocol if ordered  Outcome: Progressing

## 2023-09-06 VITALS
BODY MASS INDEX: 26.22 KG/M2 | SYSTOLIC BLOOD PRESSURE: 130 MMHG | HEART RATE: 70 BPM | TEMPERATURE: 98.3 F | DIASTOLIC BLOOD PRESSURE: 72 MMHG | OXYGEN SATURATION: 96 % | RESPIRATION RATE: 17 BRPM | HEIGHT: 64 IN

## 2023-09-06 LAB
ANION GAP SERPL CALCULATED.3IONS-SCNC: 5 MMOL/L
BACTERIA BLD CULT: NORMAL
BACTERIA BLD CULT: NORMAL
BUN SERPL-MCNC: 7 MG/DL (ref 5–25)
CALCIUM SERPL-MCNC: 8.5 MG/DL (ref 8.4–10.2)
CHLORIDE SERPL-SCNC: 105 MMOL/L (ref 96–108)
CO2 SERPL-SCNC: 27 MMOL/L (ref 21–32)
CREAT SERPL-MCNC: 0.52 MG/DL (ref 0.6–1.3)
ERYTHROCYTE [DISTWIDTH] IN BLOOD BY AUTOMATED COUNT: 14.1 % (ref 11.6–15.1)
GFR SERPL CREATININE-BSD FRML MDRD: 93 ML/MIN/1.73SQ M
GLUCOSE SERPL-MCNC: 198 MG/DL (ref 65–140)
GLUCOSE SERPL-MCNC: 201 MG/DL (ref 65–140)
GLUCOSE SERPL-MCNC: 206 MG/DL (ref 65–140)
GLUCOSE SERPL-MCNC: 277 MG/DL (ref 65–140)
GLUCOSE SERPL-MCNC: 300 MG/DL (ref 65–140)
HCT VFR BLD AUTO: 29.4 % (ref 34.8–46.1)
HGB BLD-MCNC: 9.6 G/DL (ref 11.5–15.4)
MCH RBC QN AUTO: 29.9 PG (ref 26.8–34.3)
MCHC RBC AUTO-ENTMCNC: 32.7 G/DL (ref 31.4–37.4)
MCV RBC AUTO: 92 FL (ref 82–98)
PLATELET # BLD AUTO: 321 THOUSANDS/UL (ref 149–390)
PMV BLD AUTO: 9.7 FL (ref 8.9–12.7)
POTASSIUM SERPL-SCNC: 3.8 MMOL/L (ref 3.5–5.3)
RBC # BLD AUTO: 3.21 MILLION/UL (ref 3.81–5.12)
SARS-COV-2 RNA RESP QL NAA+PROBE: POSITIVE
SODIUM SERPL-SCNC: 137 MMOL/L (ref 135–147)
WBC # BLD AUTO: 8.6 THOUSAND/UL (ref 4.31–10.16)

## 2023-09-06 PROCEDURE — 80048 BASIC METABOLIC PNL TOTAL CA: CPT | Performed by: FAMILY MEDICINE

## 2023-09-06 PROCEDURE — C9113 INJ PANTOPRAZOLE SODIUM, VIA: HCPCS

## 2023-09-06 PROCEDURE — 85027 COMPLETE CBC AUTOMATED: CPT | Performed by: FAMILY MEDICINE

## 2023-09-06 PROCEDURE — 82948 REAGENT STRIP/BLOOD GLUCOSE: CPT

## 2023-09-06 PROCEDURE — 87635 SARS-COV-2 COVID-19 AMP PRB: CPT | Performed by: NURSE PRACTITIONER

## 2023-09-06 PROCEDURE — 99239 HOSP IP/OBS DSCHRG MGMT >30: CPT | Performed by: INTERNAL MEDICINE

## 2023-09-06 RX ORDER — OXYCODONE HYDROCHLORIDE 5 MG/1
2.5 TABLET ORAL EVERY 4 HOURS PRN
Qty: 10 TABLET | Refills: 0 | Status: SHIPPED | OUTPATIENT
Start: 2023-09-06 | End: 2023-09-11

## 2023-09-06 RX ORDER — ACETAMINOPHEN 325 MG/1
650 TABLET ORAL EVERY 6 HOURS PRN
Refills: 0
Start: 2023-09-06

## 2023-09-06 RX ADMIN — PANTOPRAZOLE SODIUM 40 MG: 40 INJECTION, POWDER, FOR SOLUTION INTRAVENOUS at 08:28

## 2023-09-06 RX ADMIN — INSULIN LISPRO 2 UNITS: 100 INJECTION, SOLUTION INTRAVENOUS; SUBCUTANEOUS at 08:28

## 2023-09-06 RX ADMIN — HEPARIN SODIUM 5000 UNITS: 5000 INJECTION INTRAVENOUS; SUBCUTANEOUS at 05:47

## 2023-09-06 RX ADMIN — SODIUM CHLORIDE, SODIUM GLUCONATE, SODIUM ACETATE, POTASSIUM CHLORIDE, MAGNESIUM CHLORIDE, SODIUM PHOSPHATE, DIBASIC, AND POTASSIUM PHOSPHATE 75 ML/HR: .53; .5; .37; .037; .03; .012; .00082 INJECTION, SOLUTION INTRAVENOUS at 09:02

## 2023-09-06 RX ADMIN — INSULIN LISPRO 4 UNITS: 100 INJECTION, SOLUTION INTRAVENOUS; SUBCUTANEOUS at 12:21

## 2023-09-06 RX ADMIN — INSULIN LISPRO 5 UNITS: 100 INJECTION, SOLUTION INTRAVENOUS; SUBCUTANEOUS at 16:21

## 2023-09-06 RX ADMIN — HEPARIN SODIUM 5000 UNITS: 5000 INJECTION INTRAVENOUS; SUBCUTANEOUS at 16:21

## 2023-09-06 RX ADMIN — INSULIN LISPRO 4 UNITS: 100 INJECTION, SOLUTION INTRAVENOUS; SUBCUTANEOUS at 16:21

## 2023-09-06 RX ADMIN — INSULIN LISPRO 5 UNITS: 100 INJECTION, SOLUTION INTRAVENOUS; SUBCUTANEOUS at 12:22

## 2023-09-06 RX ADMIN — ASPIRIN 81 MG: 81 TABLET, COATED ORAL at 08:28

## 2023-09-06 RX ADMIN — METRONIDAZOLE 500 MG: 500 INJECTION, SOLUTION INTRAVENOUS at 05:48

## 2023-09-06 RX ADMIN — INSULIN LISPRO 5 UNITS: 100 INJECTION, SOLUTION INTRAVENOUS; SUBCUTANEOUS at 08:33

## 2023-09-06 RX ADMIN — INSULIN GLARGINE 25 UNITS: 100 INJECTION, SOLUTION SUBCUTANEOUS at 08:28

## 2023-09-06 NOTE — PLAN OF CARE
Problem: GASTROINTESTINAL - ADULT  Goal: Minimal or absence of nausea and/or vomiting  Description: INTERVENTIONS:  - Administer IV fluids if ordered to ensure adequate hydration  - Maintain NPO status until nausea and vomiting are resolved  - Nasogastric tube if ordered  - Administer ordered antiemetic medications as needed  - Provide nonpharmacologic comfort measures as appropriate  - Advance diet as tolerated, if ordered  - Consider nutrition services referral to assist patient with adequate nutrition and appropriate food choices  Outcome: Progressing     Problem: GASTROINTESTINAL - ADULT  Goal: Maintains adequate nutritional intake  Description: INTERVENTIONS:  - Monitor percentage of each meal consumed  - Identify factors contributing to decreased intake, treat as appropriate  - Assist with meals as needed  - Monitor I&O, weight, and lab values if indicated  - Obtain nutrition services referral as needed  Outcome: Progressing

## 2023-09-06 NOTE — PLAN OF CARE
Problem: Nutrition/Hydration-ADULT  Goal: Nutrient/Hydration intake appropriate for improving, restoring or maintaining nutritional needs  Description: Monitor and assess patient's nutrition/hydration status for malnutrition. Collaborate with interdisciplinary team and initiate plan and interventions as ordered. Monitor patient's weight and dietary intake as ordered or per policy. Utilize nutrition screening tool and intervene as necessary. Determine patient's food preferences and provide high-protein, high-caloric foods as appropriate.      INTERVENTIONS:  - Monitor oral intake, urinary output, labs, and treatment plans  - Assess nutrition and hydration status and recommend course of action  - Evaluate amount of meals eaten  - Assist patient with eating if necessary   - Allow adequate time for meals  - Recommend/ encourage appropriate diets, oral nutritional supplements, and vitamin/mineral supplements  - Order, calculate, and assess calorie counts as needed  - Recommend, monitor, and adjust tube feedings and TPN/PPN based on assessed needs  - Assess need for intravenous fluids  - Provide specific nutrition/hydration education as appropriate  - Include patient/family/caregiver in decisions related to nutrition  Outcome: Progressing     Problem: METABOLIC, FLUID AND ELECTROLYTES - ADULT  Goal: Glucose maintained within target range  Description: INTERVENTIONS:  - Monitor Blood Glucose as ordered  - Assess for signs and symptoms of hyperglycemia and hypoglycemia  - Administer ordered medications to maintain glucose within target range  - Assess nutritional intake and initiate nutrition service referral as needed  Outcome: Progressing

## 2023-09-06 NOTE — DISCHARGE SUMMARY
[address]  Discharge- Lamont Ohara 1950, 74 y.o. female MRN: [id number]  Unit/Bed#: -Soni Encounter: [id number]  Primary Care Provider: No primary care provider on file. Date and time admitted to hospital: 8/30/2023  9:42 AM    Hypertension  Assessment & Plan  · Chronic  · Blood pressure currently controlled  · Continue home amlodipine and metoprolol    Diabetes [facility])  Assessment & Plan  Lab Results   Component Value Date    HGBA1C 8.8 (H) 08/31/2023       Recent Labs     09/05/23  2359 09/06/23  0744 09/06/23  1119 09/06/23  1552   POCGLU 206* 201* 277* 300*       Blood Sugar Average: Last 72 hrs:  (P) 238.3600906484454050     • Chronic, uncontrolled, as evidenced by a hemoglobin A1C of 10.3  • Resume home medications at discharge  • Diet compliance advised    * Acute cholecystitis  Assessment & Plan  · Patient with right upper quadrant abdominal pain  · Right upper quadrant ultrasound showing acute cholecystitis  · 9/2 cholecystectomy  · Completed a course of antibiotics  · Surgery cleared the patient for discharge      Admitting Provider:  Mely Hurtado DO  Discharge Provider:  Hawk Gonzalez MD  Admission Date: 8/30/2023       Discharge Date: 09/06/23   LOS: 6  Primary Care Physician at Discharge: No primary care provider on file. None    HOSPITAL COURSE:  Lamont Ohara is a 74 y.o. female who presented with significant abdominal pain and was diagnosed with acute cholecystitis and had laparoscopic cholecystectomy. Patient did have sepsis and completed a course of antibiotics in the hospital.  Patient still had some abdominal pain. Reconsulted surgery who said that the pain is probably due to a stitch at the site of the umbilicus. Spoke to the patient who said that she feels okay and she could go home. Spoke to the son who said that he would take the patient home.   Patient and son clearly instructed that if any problems occur they should report back to the ER. REASON FOR ADMISSION/ ADMISSION DIAGNOSES    Abdominal pain secondary to acute cholecystitis    DISCHARGE DIAGNOSES  Hypertension  Assessment & Plan  · Chronic  · Blood pressure currently controlled  · Continue home amlodipine and metoprolol    Diabetes [facility])  Assessment & Plan  Lab Results   Component Value Date    HGBA1C 8.8 (H) 08/31/2023       Recent Labs     09/05/23  2359 09/06/23  0744 09/06/23  1119 09/06/23  1552   POCGLU 206* 201* 277* 300*       Blood Sugar Average: Last 72 hrs:  (P) 238.3399785010334737     • Chronic, uncontrolled, as evidenced by a hemoglobin A1C of 10.3  • Resume home medications at discharge  • Diet compliance advised    * Acute cholecystitis  Assessment & Plan  · Patient with right upper quadrant abdominal pain  · Right upper quadrant ultrasound showing acute cholecystitis  · 9/2 cholecystectomy  · Completed a course of antibiotics  · Surgery cleared the patient for discharge      [address]  Procedure(s) (LRB):  CHOLECYSTECTOMY LAPAROSCOPIC (N/A)    RADIOLOGY RESULTS  NM lung ventilation / perfusion    Result Date: 9/1/2023  Impression: The probability for pulmonary embolus is low. Resident: Jose Eduardo Godoy, the attending radiologist, have reviewed the images and agree with the final report above. Workstation performed: [id number]     US right upper quadrant    Result Date: 9/1/2023  Impression: Abnormal gallbladder, with calculi, sludge, gallbladder wall thickening, positive Rivera sign, and small amount of pericholecystic fluid. Findings highly suspicious for acute cholecystitis in the appropriate clinical setting The examination demonstrates a significant  finding and was documented as such in [facility] for liaison and referring practitioner immediate notification.  Workstation performed: [id number]     CT recon only thoracolumbar    Result Date: 8/30/2023  Impression: No acute osseous abnormality of thoracic or lumbar spine. Chronic appearing L2 superior endplate compression fracture deformity with mild height loss. Chronic appearing L3 superior endplate compression fracture deformity with minimal height loss. Suspect severe canal stenosis at L3-L4. Degenerative changes, as detailed above. Please see same-day CT cervical spine and CT chest abdomen pelvis without contrast for further evaluation. The study was marked in [facility] for immediate notification. Workstation performed: [id number]     CT chest abdomen pelvis wo contrast    Result Date: 8/30/2023  Impression: No acute findings in the chest, abdomen or the pelvis. Workstation performed: [id number]     CT spine cervical without contrast    Result Date: 8/30/2023  Impression: No cervical spine fracture or traumatic malalignment. Please see same-day CT chest abdomen pelvis and CT recon only thoracolumbar no charge for further evaluation. Additional chronic/incidental findings as detailed above. Workstation performed: [id number]     XR chest 1 view portable    Result Date: 8/30/2023  Impression: No acute cardiopulmonary disease.  Findings are stable Workstation performed: [id number]       LABS  Results from last 7 days   Lab Units 09/06/23  0608 09/05/23  0445 09/04/23  0528 09/03/23  0518 09/02/23  0449 09/01/23  0452 08/31/23  0540   WBC Thousand/uL 8.60 8.95 9.48 12.11* 12.68* 15.08* 12.60*   HEMOGLOBIN g/dL 9.6* 10.2* 8.0* 9.5* 10.6* 11.2* 11.6   HEMATOCRIT % 29.4* 30.3* 24.8* 29.1* 32.5* 34.6* 34.5*   MCV fL 92 92 94 92 93 92 91   PLATELETS Thousands/uL 321 303 221 266 229 257 286     Results from last 7 days   Lab Units 09/06/23  0608 09/05/23  0445 09/03/23  0518 09/02/23  2226 09/02/23  0449 09/01/23  1138 08/31/23  0540   SODIUM mmol/L 137 134* 134* 132* 132* 131* 135   POTASSIUM mmol/L 3.8 4.3 3.8 4.9 3.9 3.7 3.9   CHLORIDE mmol/L 105 103 105 102 102 99 101   CO2 mmol/L 27 26 23 22 24 25 25   BUN mg/dL 7 13 21 21 14 15 17 CREATININE mg/dL 0.52* 0.58* 0.77 0.91 0.65 0.73 0.59*   CALCIUM mg/dL 8.5 8.5 8.5 8.4 7.8* 8.6 9.0   ALBUMIN g/dL  --   --   --   --  3.1* 3.4*  --    TOTAL BILIRUBIN mg/dL  --   --   --   --  0.64 0.54  --    ALK PHOS U/L  --   --   --   --  56 61  --    ALT U/L  --   --   --   --  19 23  --    AST U/L  --   --   --   --  11* 11*  --    EGFR ml/min/1.73sq m 93 90 75 61 87 80 89   GLUCOSE RANDOM mg/dL 198* 239* 345* 564* 280* 402* 283*                  Results from last 7 days   Lab Units 09/06/23  1552 09/06/23  1119 09/06/23  0744 09/05/23  2359 09/05/23  2201 09/05/23  1537 09/05/23  1216 09/05/23  1049 09/05/23  0736 09/04/23  2241   POC GLUCOSE mg/dl 300* 277* 201* 206* 215* 233* 278* 250* 226* 339*     Results from last 7 days   Lab Units 08/31/23  0540   HEMOGLOBIN A1C % 8.8*                   Cultures:         Invalid input(s): "URIBILINOGEN"        Results from last 7 days   Lab Units 09/01/23  0925   BLOOD CULTURE  No Growth After 5 Days. No Growth After 5 Days.        PHYSICAL EXAM:  Vitals:   Blood Pressure: 130/72 (09/06/23 1412)  Pulse: 70 (09/06/23 1412)  Temperature: 98.3 °F (36.8 °C) (09/06/23 1412)  Temp Source: Temporal (08/30/23 7650)  Respirations: 17 (09/05/23 2203)  Height: 5' 4" (162.6 cm) (09/03/23 1100)  SpO2: 96 % (09/06/23 1412)    General appearance: alert, appears stated age and cooperative  HEENT - atraumatic and normocephalic  Neck- supple  Skin - no fresh rash  Extremities no fresh focal deformities  Cardiovascular- S1-S2 heard  Respiratory- bilateral air entry present, no crackles or rhonchi  Skin - no fresh rash  Abdomen - normal bowel sounds present, no rebound tenderness  CNS- No fresh focal deficits  Psych- no acute psychosis     Planned Re-admission: No  Discharge Disposition: Home/Self Care    Test Results Pending at Discharge:   Pending Labs     Order Current Status    Tissue Exam In process      Incidental findings: None    Medications   · Summary of Medication Adjustments made as a result of this hospitalization: Pain meds  · Medication Dosing Tapers - Please refer to Discharge Medication List for details on any medication dosing tapers (if applicable to patient). · Discharge Medication List: See after visit summary for reconciled discharge medications. Diet restrictions: Soft diet       Diet Orders   (From admission, onward)             Start     Ordered    09/06/23 1153  Dietary nutrition supplements  Once        Question Answer Comment   Select Supplement: Kelly Marti    Select Supplement: Ankur Mckinnon    Frequency Lunch, Dinner        09/06/23 1153    09/02/23 2000  Diet Naveen/CHO Controlled; Consistent Carbohydrate Diet Level 3 (6 carb servings/90 grams CHO/meal)  Diet effective now        References:    Adult Nutrition Support Algorithm    RD Therapeutic Diet Order Protocol   Question Answer Comment   Diet Type Naveen/CHO Controlled    Naveen/CHO Controlled Consistent Carbohydrate Diet Level 3 (6 carb servings/90 grams CHO/meal)    RD to adjust diet per protocol? No        09/02/23 2000              Activity restrictions: No strenuous activity  Discharge Condition: stable    Outpatient Follow-Up and Discharge Instructions  See after visit summary section titled Discharge Instructions for information provided to patient and family. Code Status: Level 1 - Full Code  Discharge Statement   I spent 34 minutes discharging the patient. This time was spent on the day of discharge. Greater than 50% of total time was spent with the patient and / or family counseling and / or coordination of care. Rea Coker MD  [facility] Internal Medicine    ** Please Note: This note has been constructed using a voice recognition system.  **

## 2023-09-06 NOTE — ASSESSMENT & PLAN NOTE
Lab Results   Component Value Date    HGBA1C 8.8 (H) 08/31/2023       Recent Labs     09/05/23  2359 09/06/23  0744 09/06/23  1119 09/06/23  1552   POCGLU 206* 201* 277* 300*       Blood Sugar Average: Last 72 hrs:  (P) 238.9737180808644039     • Chronic, uncontrolled, as evidenced by a hemoglobin A1C of 10.3  • Resume home medications at discharge  • Diet compliance advised

## 2023-09-06 NOTE — PLAN OF CARE

## 2023-09-06 NOTE — ASSESSMENT & PLAN NOTE
· Patient with right upper quadrant abdominal pain  · Right upper quadrant ultrasound showing acute cholecystitis  · 9/2 cholecystectomy  · Completed a course of antibiotics  · Surgery cleared the patient for discharge

## 2023-09-07 RX ORDER — NIRMATRELVIR AND RITONAVIR 300-100 MG
3 KIT ORAL 2 TIMES DAILY
Qty: 30 TABLET | Refills: 0 | Status: SHIPPED | OUTPATIENT
Start: 2023-09-07 | End: 2023-09-12

## 2023-09-07 NOTE — UTILIZATION REVIEW
NOTIFICATION OF ADMISSION DISCHARGE   This is a Notification of Discharge from [address] [place] [address]. Please be advised that this patient has been discharge from our facility. Below you will find the admission and discharge date and time including the patient’s disposition. UTILIZATION REVIEW CONTACT:  Stephen Londono  Utilization   Network Utilization Review Department  Phone: [phone] x carefully listen to the prompts. All voicemails are confidential.  Email: [email or web address]     ADMISSION INFORMATION  PRESENTATION DATE: 8/30/2023  9:42 AM  OBERVATION ADMISSION DATE: 8/30/23  INPATIENT ADMISSION DATE: 8/31/23  3:14 PM   DISCHARGE DATE: 9/7/2023  1:09 AM   DISPOSITION:Home/Self Care    IMPORTANT INFORMATION:  Send all requests for admission clinical reviews, approved or denied determinations and any other requests to dedicated fax number below belonging to the campus where the patient is receiving treatment.  List of dedicated fax numbers:  [place] DENIALS (Administrative/Medical Necessity) [phone] [facility] [phone]   [facility] [phone]   [facility] [phone]   [facility] [phone]   [address] [place] [address] [phone]   [facility] [phone]   [address] [place] [address] [address] [place] [address] [phone]   [address] [place] [address] [phone]   [address] [facility] [phone]   [address] [facility] [address] [place] [address] [phone]

## 2023-09-07 NOTE — QUICK NOTE
Was notified by RN patient was complaining of cough and slight shortness of breath this evening so a COVID test was done at the request of the family prior to discharge. Patient was found to be COVID-positive. Did ambulate patient again tonight while checking pulse ox and pulse ox with ambulation remained 94 to 96%, vital signs stable. Patient reports she still feels short of breath with slight cough but no other symptoms and does feel comfortable going home. Did review with patient and patient's family that since patient is not hypoxic patient may be discharged. Family did request if there is possibility of medication to help with symptoms. Also reviewed monitoring closely for worsening shortness of breath, fevers, following up with PCP this week and returning to ED if any worsening shortness of breath, high fevers. Also discussed obtaining a pulse oximeter to monitor SPO2 level at home and monitoring blood pressure. Did review and discussed with patient and patient's family about Paxlovid as patient does meet criteria as symptoms started 9/6 and she tested positive on 9/6 and GFR is above 60, no history of end-stage renal disease or liver failure, patient over 70 with history of diabetes. .  Did review Paxlovid is not a cure for COVID, but can hopefully help reduce severity of symptoms. Reviewed side effects of Paxlovid including monitoring blood pressure especially while on amlodipine as blood pressure can drop. As well as monitoring while on Paxlovid with as needed oxycodone and reducing oxycodone dosing and not taking Paxlovid and oxycodone together and monitoring for any signs of drowsiness or respiratory distress. Patient's son in agreement that he will monitor his mom, and patient and patient's family in agreement to try Paxlovid. E prescription sent to pharmacy for 5-day course.   Reviewed with patient's family in regards to CDC recommendations for isolation and masking especially now family has been exposed about masking for 10 days if they need to be out in public while asymptomatic. And patient should quarantine for 10 days and mask answered all questions and concerns at this time. Patient will be discharged tonight. Discharge paperwork revised to include COVID-19 precautions.

## 2023-09-14 NOTE — DISCHARGE SUMMARY
[address]  Discharge- Ledy Lovett 1950, 74 y.o. female MRN: [id number]  Unit/Bed#: -Soni Encounter: [id number]  Primary Care Provider: No primary care provider on file. Date and time admitted to hospital: 8/30/2023  9:42 AM  Patient left the hospital after 12 midnight on 9/7/2023. Patient was not seen by me on 9/7/2023. Discharge summary date changed to 9/7/23 via this document    Hypertension  Assessment & Plan  · Chronic  · Blood pressure currently controlled  · Continue home amlodipine and metoprolol    Diabetes [facility])  Assessment & Plan  Lab Results   Component Value Date    HGBA1C 8.8 (H) 08/31/2023       Recent Labs     09/05/23  2359 09/06/23  0744 09/06/23  1119 09/06/23  1552   POCGLU 206* 201* 277* 300*       Blood Sugar Average: Last 72 hrs:  (P) 238.3448175088473347     • Chronic, uncontrolled, as evidenced by a hemoglobin A1C of 10.3  • Resume home medications at discharge  • Diet compliance advised    * Acute cholecystitis  Assessment & Plan  · Patient with right upper quadrant abdominal pain  · Right upper quadrant ultrasound showing acute cholecystitis  · 9/2 cholecystectomy  · Completed a course of antibiotics  · Surgery cleared the patient for discharge      Admitting Provider:  Mirna Bailey DO  Discharge Provider:  Javad Orellana MD  Admission Date: 8/30/2023       Discharge Date: 09/07/23   LOS: 6  Primary Care Physician at Discharge: No primary care provider on file. None    HOSPITAL COURSE:  Ledy Lovett is a 74 y.o. female who presented with significant abdominal pain and was diagnosed with acute cholecystitis and had laparoscopic cholecystectomy. Patient did have sepsis and completed a course of antibiotics in the hospital.  Patient still had some abdominal pain. Reconsulted surgery who said that the pain is probably due to a stitch at the site of the umbilicus.   Spoke to the patient who said that she feels okay and she could go home. Spoke to the son who said that he would take the patient home. Patient and son clearly instructed that if any problems occur they should report back to the ER. REASON FOR ADMISSION/ ADMISSION DIAGNOSES    Abdominal pain secondary to acute cholecystitis    DISCHARGE DIAGNOSES  Hypertension  Assessment & Plan  · Chronic  · Blood pressure currently controlled  · Continue home amlodipine and metoprolol    Diabetes [facility])  Assessment & Plan  Lab Results   Component Value Date    HGBA1C 8.8 (H) 08/31/2023       Recent Labs     09/05/23  2359 09/06/23  0744 09/06/23  1119 09/06/23  1552   POCGLU 206* 201* 277* 300*       Blood Sugar Average: Last 72 hrs:  (P) 238.9859134036864787     • Chronic, uncontrolled, as evidenced by a hemoglobin A1C of 10.3  • Resume home medications at discharge  • Diet compliance advised    * Acute cholecystitis  Assessment & Plan  · Patient with right upper quadrant abdominal pain  · Right upper quadrant ultrasound showing acute cholecystitis  · 9/2 cholecystectomy  · Completed a course of antibiotics  · Surgery cleared the patient for discharge      [address]  Procedure(s) (LRB):  CHOLECYSTECTOMY LAPAROSCOPIC (N/A)    RADIOLOGY RESULTS  NM lung ventilation / perfusion    Result Date: 9/1/2023  Impression: The probability for pulmonary embolus is low. Resident: Lion Carranza, the attending radiologist, have reviewed the images and agree with the final report above. Workstation performed: [id number]     US right upper quadrant    Result Date: 9/1/2023  Impression: Abnormal gallbladder, with calculi, sludge, gallbladder wall thickening, positive Rivera sign, and small amount of pericholecystic fluid.  Findings highly suspicious for acute cholecystitis in the appropriate clinical setting The examination demonstrates a significant  finding and was documented as such in [facility] for liaison and referring practitioner immediate notification. Workstation performed: [id number]     CT recon only thoracolumbar    Result Date: 8/30/2023  Impression: No acute osseous abnormality of thoracic or lumbar spine. Chronic appearing L2 superior endplate compression fracture deformity with mild height loss. Chronic appearing L3 superior endplate compression fracture deformity with minimal height loss. Suspect severe canal stenosis at L3-L4. Degenerative changes, as detailed above. Please see same-day CT cervical spine and CT chest abdomen pelvis without contrast for further evaluation. The study was marked in [facility] for immediate notification. Workstation performed: [id number]     CT chest abdomen pelvis wo contrast    Result Date: 8/30/2023  Impression: No acute findings in the chest, abdomen or the pelvis. Workstation performed: [id number]     CT spine cervical without contrast    Result Date: 8/30/2023  Impression: No cervical spine fracture or traumatic malalignment. Please see same-day CT chest abdomen pelvis and CT recon only thoracolumbar no charge for further evaluation. Additional chronic/incidental findings as detailed above. Workstation performed: [id number]     XR chest 1 view portable    Result Date: 8/30/2023  Impression: No acute cardiopulmonary disease.  Findings are stable Workstation performed: [id number]       LABS  Results from last 7 days   Lab Units 09/06/23  0608 09/05/23  0445 09/04/23  0528 09/03/23  0518 09/02/23  0449 09/01/23  0452 08/31/23  0540   WBC Thousand/uL 8.60 8.95 9.48 12.11* 12.68* 15.08* 12.60*   HEMOGLOBIN g/dL 9.6* 10.2* 8.0* 9.5* 10.6* 11.2* 11.6   HEMATOCRIT % 29.4* 30.3* 24.8* 29.1* 32.5* 34.6* 34.5*   MCV fL 92 92 94 92 93 92 91   PLATELETS Thousands/uL 321 303 221 266 229 257 286     Results from last 7 days   Lab Units 09/06/23  0608 09/05/23  0445 09/03/23  0518 09/02/23  2226 09/02/23  0449 09/01/23  1138 08/31/23  0540   SODIUM mmol/L 137 134* 134* 132* 132* 131* 135   POTASSIUM mmol/L 3.8 4.3 3.8 4.9 3.9 3.7 3.9   CHLORIDE mmol/L 105 103 105 102 102 99 101   CO2 mmol/L 27 26 23 22 24 25 25   BUN mg/dL 7 13 21 21 14 15 17   CREATININE mg/dL 0.52* 0.58* 0.77 0.91 0.65 0.73 0.59*   CALCIUM mg/dL 8.5 8.5 8.5 8.4 7.8* 8.6 9.0   ALBUMIN g/dL  --   --   --   --  3.1* 3.4*  --    TOTAL BILIRUBIN mg/dL  --   --   --   --  0.64 0.54  --    ALK PHOS U/L  --   --   --   --  56 61  --    ALT U/L  --   --   --   --  19 23  --    AST U/L  --   --   --   --  11* 11*  --    EGFR ml/min/1.73sq m 93 90 75 61 87 80 89   GLUCOSE RANDOM mg/dL 198* 239* 345* 564* 280* 402* 283*                  Results from last 7 days   Lab Units 09/06/23  1552 09/06/23  1119 09/06/23  0744 09/05/23  2359 09/05/23  2201 09/05/23  1537 09/05/23  1216 09/05/23  1049 09/05/23  0736 09/04/23  2241   POC GLUCOSE mg/dl 300* 277* 201* 206* 215* 233* 278* 250* 226* 339*     Results from last 7 days   Lab Units 08/31/23  0540   HEMOGLOBIN A1C % 8.8*                   Cultures:         Invalid input(s): "URIBILINOGEN"        Results from last 7 days   Lab Units 09/01/23  0925   BLOOD CULTURE  No Growth After 5 Days. No Growth After 5 Days. PHYSICAL EXAM:  Patient not seen by me on 9/7/23    Planned Re-admission: No  Discharge Disposition: Home/Self Care    Test Results Pending at Discharge:   Pending Labs     Order Current Status    Tissue Exam In process      Incidental findings: None    Medications   · Summary of Medication Adjustments made as a result of this hospitalization: Pain meds  · Medication Dosing Tapers - Please refer to Discharge Medication List for details on any medication dosing tapers (if applicable to patient). · Discharge Medication List: See after visit summary for reconciled discharge medications.      Diet restrictions: Soft diet       Diet Orders   (From admission, onward)             Start     Ordered    09/06/23 1153  Dietary nutrition supplements  Once        Question Answer Comment   Select Supplement: Glucerna-Strawberry    Select Supplement: Glucerna-Vanilla    Frequency Lunch, Dinner        09/06/23 1153    09/02/23 2000  Diet Naveen/CHO Controlled; Consistent Carbohydrate Diet Level 3 (6 carb servings/90 grams CHO/meal)  Diet effective now        References:    Adult Nutrition Support Algorithm    RD Therapeutic Diet Order Protocol   Question Answer Comment   Diet Type Naveen/CHO Controlled    Naveen/CHO Controlled Consistent Carbohydrate Diet Level 3 (6 carb servings/90 grams CHO/meal)    RD to adjust diet per protocol? No        09/02/23 2000              Activity restrictions: No strenuous activity  Discharge Condition: stable    Outpatient Follow-Up and Discharge Instructions  See after visit summary section titled Discharge Instructions for information provided to patient and family. Code Status: Level 1 - Full Code      Dar Balderrama MD  [facility]    ** Please Note: This note has been constructed using a voice recognition system.  **

## 2023-09-15 ENCOUNTER — TELEPHONE (OUTPATIENT)
Dept: SURGERY | Facility: CLINIC | Age: 73
End: 2023-09-15

## 2023-09-15 NOTE — TELEPHONE ENCOUNTER
CLAUDIA      I found a POST IT off to the side of my desk under a binder . I have no idea who wrote it, it was never handed to me or  never verbalized to me. It just had her name and med rec number. This patient had a Lap Becky with Dr Magdy Champion on 9/2. I noticed he did not a have a post operative appt scheduled. I called to help assist. Her son Clif answered. He declined setting something up. He stated she is going to [facility] , she comes back and forth a lot because her Mom is out there and will see her PCP there. I advised that as soon as she can, she should see Dr Magdy Champion for a Post Operative Follow up; and if no further appointments are needed, he will discharge her.

## 2023-09-25 PROCEDURE — 88360 TUMOR IMMUNOHISTOCHEM/MANUAL: CPT | Performed by: PATHOLOGY

## 2023-09-25 PROCEDURE — 88304 TISSUE EXAM BY PATHOLOGIST: CPT | Performed by: PATHOLOGY

## 2023-09-25 PROCEDURE — 88342 IMHCHEM/IMCYTCHM 1ST ANTB: CPT | Performed by: PATHOLOGY

## 2023-09-25 PROCEDURE — 88341 IMHCHEM/IMCYTCHM EA ADD ANTB: CPT | Performed by: PATHOLOGY

## 2023-09-26 ENCOUNTER — TELEPHONE (OUTPATIENT)
Dept: SURGERY | Facility: CLINIC | Age: 73
End: 2023-09-26

## 2023-09-26 NOTE — TELEPHONE ENCOUNTER
Called patient at [phone] to discuss pathology results. Went to voicemail and left message to call office at [phone] to discuss results.

## 2023-09-27 ENCOUNTER — TELEPHONE (OUTPATIENT)
Dept: SURGERY | Facility: CLINIC | Age: 73
End: 2023-09-27

## 2023-09-27 NOTE — TELEPHONE ENCOUNTER
Dr Carmen Goetz. FYI:    Son, Malathi Cancer, missed your call re: Pathology results.   He made patients Post Op appointment for tomorrow since she is back in town and said he is coming in and you can review them in person

## 2023-09-27 NOTE — TELEPHONE ENCOUNTER
Elizabeth Reardon thank you. I saw we have time where she is scheduled so please make her appt a 30 min time.

## 2023-09-28 ENCOUNTER — OFFICE VISIT (OUTPATIENT)
Dept: SURGERY | Facility: CLINIC | Age: 73
End: 2023-09-28

## 2023-09-28 ENCOUNTER — TELEPHONE (OUTPATIENT)
Dept: HEMATOLOGY ONCOLOGY | Facility: CLINIC | Age: 73
End: 2023-09-28

## 2023-09-28 VITALS
RESPIRATION RATE: 18 BRPM | HEIGHT: 64 IN | OXYGEN SATURATION: 99 % | BODY MASS INDEX: 24.17 KG/M2 | HEART RATE: 71 BPM | WEIGHT: 141.6 LBS | DIASTOLIC BLOOD PRESSURE: 72 MMHG | TEMPERATURE: 97.9 F | SYSTOLIC BLOOD PRESSURE: 110 MMHG

## 2023-09-28 DIAGNOSIS — C23 ADENOCARCINOMA OF GALLBLADDER (HCC): ICD-10-CM

## 2023-09-28 DIAGNOSIS — K81.0 ACUTE CHOLECYSTITIS: Primary | ICD-10-CM

## 2023-09-28 PROCEDURE — 99024 POSTOP FOLLOW-UP VISIT: CPT | Performed by: STUDENT IN AN ORGANIZED HEALTH CARE EDUCATION/TRAINING PROGRAM

## 2023-09-28 NOTE — TELEPHONE ENCOUNTER
I called Veronica Baxter in response to a referral that was received for patient to establish care with Medical Oncology and surgical oncology. Outreach was made to schedule a consultation. I left a voicemail explaining the reason for my call and advised patient to call [facility] at [phone]. Another attempt will be made to contact patient.

## 2023-09-28 NOTE — PROGRESS NOTES
Assessment/Plan:  68-year-old female status post laparoscopic cholecystectomy on 9/2/2023  -Patient is tolerating a diet though has a decreased appetite  -She is having bowel function  -States she does have some pain at her umbilicus and on the right lateral incisions, mostly with movement  -Discussed this is normal and should slowly improve with time  -Laparoscopic incisions healing well without erythema induration or drainage  -Pathology report reviewed  -Recommend no heavy lifting greater than 15 to 20 pounds for total of 4 weeks after surgery  -Follow-up in office as needed  -Pathology report was discussed at length with both the patient and her son  -Due to tumor being pT2 referral placed to both Surgical oncology and Medical oncology for further evaluation and management    Pathology Results:  Final Diagnosis   A. 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  -Tumor is present in perimuscular fibrous tissue (AJCC 8th edition pT2.) The surgical margins are uninvolved. Perineural invasion identified. No vascular invasion identified. See comment.  -Cholelithiasis. Comment  This case was  sent out to BRIAN Romo M.D., and an expert in gastrointestinal pathology at [facility] Pathology consultation Service and  signed out by her associate Dr Elizabeth Blank. The above mentioned diagnosis is exactly his consultation report diagnosis. A copy of this consultation report is on file at [facility] department of pathology. Please see his full consultation report in the notes section. I reviewed the pathology report and discussed the findings with the patient and their accompanying family or caregiver, if present.  All questions were answered to satisfaction and the patient along with family or caregiver, if present, voiced understanding. 1. Acute cholecystitis    2. Adenocarcinoma of gallbladder [facility])  -     Ambulatory Referral to Hematology / Oncology; Future  -     Ambulatory Referral to Surgical Oncology; Future               Subjective:      Patient ID: Arjun Belcher is a 74 y.o. female. Triage Notes:    Patient is a 68-year-old female who presents to office for evaluation status post laparoscopic cholecystectomy. She is tolerating a diet but has a decreased appetite. She is having bowel function. She has been having some pain on the right side of her abdomen near her incisions when she moves and also some discomfort at her umbilicus. She overall has been recovering well. The following portions of the patient's history were reviewed and updated as appropriate: allergies, current medications, past family history, past medical history, past social history, past surgical history and problem list.    Review of Systems   Constitutional: Negative for chills, fatigue and fever. HENT: Negative for congestion, hearing loss, rhinorrhea and sore throat. Eyes: Negative for pain and discharge. Respiratory: Negative for cough, chest tightness and shortness of breath. Cardiovascular: Negative for chest pain and palpitations. Gastrointestinal: Positive for abdominal pain. Negative for constipation, diarrhea, nausea and vomiting. Endocrine: Negative for cold intolerance and heat intolerance. Genitourinary: Negative for difficulty urinating and dysuria. Musculoskeletal: Negative for back pain and neck pain. Skin: Negative for color change and rash. Allergic/Immunologic: Negative for environmental allergies and food allergies. Neurological: Negative for seizures and headaches. Hematological: Negative for adenopathy. Does not bruise/bleed easily. Psychiatric/Behavioral: Negative for confusion and hallucinations.          Objective:      /72 (BP Location: Left arm, Patient Position: Sitting, Cuff Size: Standard)   Pulse 71   Temp 97.9 °F (36.6 °C)   Resp 18   Ht 5' 4" (1.626 m)   Wt 64.2 kg (141 lb 9.6 oz)   SpO2 99%   BMI 24.31 kg/m²     Below is the patient's most recent value for Albumin, ALT, AST, BUN, Calcium, Chloride, Cholesterol, CO2, Creatinine, GFR, Glucose, HDL, Hematocrit, Hemoglobin, Hemoglobin A1C, LDL, Magnesium, Phosphorus, Platelets, Potassium, PSA, Sodium, Triglycerides, and WBC. Lab Results   Component Value Date    ALT 19 09/02/2023    AST 11 (L) 09/02/2023    BUN 7 09/06/2023    CALCIUM 8.5 09/06/2023     09/06/2023    CO2 27 09/06/2023    CREATININE 0.52 (L) 09/06/2023    HCT 29.4 (L) 09/06/2023    HGB 9.6 (L) 09/06/2023    HGBA1C 8.8 (H) 08/31/2023    MG 1.6 (L) 08/30/2023    PHOS 2.9 08/30/2023     09/06/2023    K 3.8 09/06/2023    WBC 8.60 09/06/2023     Note: for a comprehensive list of the patient's lab results, access the Results Review activity. Physical Exam  Constitutional:       Appearance: Normal appearance. HENT:      Head: Normocephalic and atraumatic. Nose: Nose normal.   Eyes:      General: No scleral icterus. Conjunctiva/sclera: Conjunctivae normal.   Cardiovascular:      Rate and Rhythm: Normal rate. Pulmonary:      Effort: Pulmonary effort is normal.   Abdominal:      General: There is no distension. Palpations: Abdomen is soft. Tenderness: There is no abdominal tenderness. Comments: Laparoscopic incisions healing well without erythema induration or drainage   Musculoskeletal:         General: No signs of injury. Skin:     General: Skin is warm. Coloration: Skin is not jaundiced. Neurological:      General: No focal deficit present. Mental Status: She is alert and oriented to person, place, and time.    Psychiatric:         Mood and Affect: Mood normal.         Behavior: Behavior normal.

## 2023-10-02 ENCOUNTER — TELEPHONE (OUTPATIENT)
Dept: HEMATOLOGY ONCOLOGY | Facility: CLINIC | Age: 73
End: 2023-10-02

## 2023-10-02 NOTE — TELEPHONE ENCOUNTER
I called Antonio Mcnamara in response to a referral that was received for patient to establish care with Hematology. And surgical oncology    Outreach was made to schedule a consultation. I left a voicemail explaining the reason for my call and advised patient to call [facility] at [phone]. Another attempt will be made to contact patient.

## 2023-10-04 ENCOUNTER — DOCUMENTATION (OUTPATIENT)
Dept: HEMATOLOGY ONCOLOGY | Facility: CLINIC | Age: 73
End: 2023-10-04

## 2023-10-04 NOTE — PROGRESS NOTES
Intake received/ Chart reviewed for services completed outside of [facility]    Pathology completed: 9/25/2023 [id number]    Imaging completed: 9/11/2023 CT CAP    All records needed are in patients chart. No records retrieval needed at this time.   Patient is scheduled to see both Wally Arrington and Lynette Hall 10/13/2023

## 2023-10-04 NOTE — PROGRESS NOTES
Intake received and chart reviewed for pathway workup evaluation. Patient underwent recent cholecystectomy with pathology. Referring provider-  Dr. Mary Oliver-  9/6 CBC and BMP      Imaging-  8/31    Narrative & Impression   CT CHEST, ABDOMEN AND PELVIS WITHOUT IV CONTRAST     INDICATION:   acute chest pain, abdominal pain, RUQ/RLQ pain. "Patient presents with c/o RUQ abdominal pain, chest pain and vomiting - onset this morning. Constant pressure mid chest, non radiating. +Cardiac history"     COMPARISON: AP chest from earlier the same day.     TECHNIQUE: CT examination of the chest, abdomen and pelvis was performed without intravenous contrast. Multiplanar 2D reformatted images were created from the source data.     This examination, like all CT scans performed in the [facility], was performed utilizing techniques to minimize radiation dose exposure, including the use of iterative reconstruction and automated exposure control. Radiation dose length   product (DLP) for this visit:  821 mGy-cm     Enteric contrast was administered.     FINDINGS:     CHEST     LUNGS:  Lungs are clear. There is no tracheal or endobronchial lesion.     PLEURA:  Unremarkable.     HEART/GREAT VESSELS: Cardiomegaly. No thoracic aortic aneurysm.     MEDIASTINUM AND BETH:  Unremarkable.     CHEST WALL AND LOWER NECK:  Unremarkable.     ABDOMEN     LIVER/BILIARY TREE: Hepatomegaly.     GALLBLADDER: There are gallstone(s) within the gallbladder, without pericholecystic inflammatory changes.     SPLEEN:  Unremarkable.     PANCREAS:  Unremarkable.     ADRENAL GLANDS:  Unremarkable.     KIDNEYS/URETERS: Nonobstructive 4 mm right upper pole renal calculus.     No hydronephrosis.     STOMACH AND BOWEL:  Unremarkable.     APPENDIX: Noninflamed. Calcified uterine fibroids.     ABDOMINOPELVIC CAVITY:  No ascites. No pneumoperitoneum.   No lymphadenopathy.     VESSELS:  Unremarkable for patient's age.     PELVIS     REPRODUCTIVE ORGANS:  Unremarkable for patient's age.     URINARY BLADDER:  Unremarkable.     ABDOMINAL WALL/INGUINAL REGIONS:  Unremarkable.     OSSEOUS STRUCTURES:  No acute fracture or destructive osseous lesion. The CT thoracolumbar spine study is reported separately.     IMPRESSION:     No acute findings in the chest, abdomen or the pelvis.              Scheduled appointments-    10/13 Dr. Isadora Chester and Dr. Placido Finley      Surgical date-  9/2/23      Post surgical pathology-    Final Diagnosis   A. 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  -Tumor is present in perimuscular fibrous tissue (AJCC 8th edition pT2.) The surgical margins are uninvolved. Perineural invasion identified. No vascular invasion identified.

## 2023-10-13 ENCOUNTER — CONSULT (OUTPATIENT)
Dept: SURGICAL ONCOLOGY | Facility: CLINIC | Age: 73
End: 2023-10-13
Payer: COMMERCIAL

## 2023-10-13 ENCOUNTER — OFFICE VISIT (OUTPATIENT)
Dept: HEMATOLOGY ONCOLOGY | Facility: CLINIC | Age: 73
End: 2023-10-13
Payer: COMMERCIAL

## 2023-10-13 VITALS
SYSTOLIC BLOOD PRESSURE: 138 MMHG | RESPIRATION RATE: 18 BRPM | TEMPERATURE: 96.4 F | DIASTOLIC BLOOD PRESSURE: 70 MMHG | HEIGHT: 64 IN | OXYGEN SATURATION: 97 % | WEIGHT: 145 LBS | HEART RATE: 84 BPM | BODY MASS INDEX: 24.75 KG/M2

## 2023-10-13 VITALS
WEIGHT: 146 LBS | HEIGHT: 64 IN | OXYGEN SATURATION: 99 % | HEART RATE: 80 BPM | SYSTOLIC BLOOD PRESSURE: 114 MMHG | BODY MASS INDEX: 24.92 KG/M2 | TEMPERATURE: 97.3 F | DIASTOLIC BLOOD PRESSURE: 78 MMHG | RESPIRATION RATE: 18 BRPM

## 2023-10-13 DIAGNOSIS — C23 ADENOCARCINOMA OF GALLBLADDER (HCC): ICD-10-CM

## 2023-10-13 DIAGNOSIS — D64.9 ANEMIA, UNSPECIFIED TYPE: Primary | ICD-10-CM

## 2023-10-13 DIAGNOSIS — C23 ADENOCARCINOMA OF GALLBLADDER (HCC): Primary | ICD-10-CM

## 2023-10-13 PROCEDURE — 99205 OFFICE O/P NEW HI 60 MIN: CPT | Performed by: INTERNAL MEDICINE

## 2023-10-13 PROCEDURE — 99215 OFFICE O/P EST HI 40 MIN: CPT | Performed by: SURGERY

## 2023-10-13 PROCEDURE — 99205 OFFICE O/P NEW HI 60 MIN: CPT | Performed by: SURGERY

## 2023-10-13 RX ORDER — ALPRAZOLAM 1 MG/1
1 TABLET ORAL SEE ADMIN INSTRUCTIONS
Qty: 2 TABLET | Refills: 0 | Status: SHIPPED | OUTPATIENT
Start: 2023-10-13

## 2023-10-13 NOTE — PROGRESS NOTES
Hematology/Oncology Outpatient Follow-up  Savita Simons RFUTCCP 10 y.o. female [id number]    Date:  10/13/2023        Assessment and Plan:  1. Adenocarcinoma of gallbladder [facility])  I had a lengthy discussion with the patient and her son/daughter-in-law who who helped with Serbian translation during this office visit. The patient is status post laparoscopic cholecystectomy with pathological finding of moderately differentiated adenocarcinoma of the gallbladder, clinical stage T2 a N0 M0 with l negative margins and perineural invasion. The patient already was seen by the surgical oncology team who recommended an MRI of the abdomen/liver followed by resection of the segment 4B/5 of the liver with portal lymph node dissection. I did explain to the patient and her son/daughter-in-law in the potential benefit of adjuvant treatment extensively. The pattern of disease recurrence in completely resected GBC, in which the majority of patients have distant failure as a component of initial disease recurrence, suggests that chemotherapy might be a more rational adjuvant treatment strategy than RT with or without concurrent chemotherapy. The NCCN guidelines is recommending to consider capecitabine as adjuvant treatment  at the dose of 1500 mg twice a day 2 weeks on 1 week off for 6 months. The recommendation of concurrent chemoradiation according to the NCCN guidelines is rated as category 2B due to the lack of randomized trials which makes it difficult to ascertain whether survival is favorably impacted. We will wait until the final staging/planned surgery is completed then evaluated the patient again in the oncology clinic to discuss the final treatment plan. She was given material to read about capecitabine and potential side effects. The family stated that they are interested in seeing an oncologist at either [facility] or [address] [place] [address] since they live around that area.     - Cancer antigen 19-9; Future  - CEA; Future    2. Anemia, unspecified type  Blood work will be ordered to evaluate her anemia.  - CBC and differential; Future  - Comprehensive metabolic panel; Future  - Magnesium; Future  - C-reactive protein; Future  - Sedimentation rate, automated; Future  - LD,Blood; Future  - Iron Panel (Includes Ferritin, Iron Sat%, Iron, and TIBC); Future  - Ferritin; Future  - Vitamin B12; Future        HPI:  This is a very pleasant 68-year-old  female, originally from [place] with past medical history of diabetes mellitus and hypertension. She apparently had abdominal pain around the end of August of this year. She had multiple imaging including CT scan of the chest and pelvis without contrast on 8/30/2023 which was negative for acute findings. Ultrasound of the abdomen on 9/1/2023 showed abnormal gallbladder with calculi, sludge and gallbladder wall thickening with a positive Rivera sign. The findings were felt to be highly suspicious for acute cholecystitis. The patient then underwent laparoscopic cholecystectomy on 9/2/2023 with a surprising Logical finding of moderately differentiated adenocarcinoma arising in a background of erosive chronic cholecystitis with high-grade surface dysplasia and dense serosal adhesions. Tumor is present in perimuscular fibrous tissue (AJCC 8th edition pT2.) The surgical margins are uninvolved. Perineural invasion identified. No vascular invasion identified. The patient was seen by the surgical oncology team who recommended MRI of the abdomen followed by segment 4B/5 liver resection with portal lymph node dissection.     Oncology History   Adenocarcinoma of gallbladder ([address])   9/2/2023 Surgery    Laparoscopic cholecystectomy:  Gallbladder, Gallbladder and contents , cholecystectomy:  -Moderately differentiated adenocarcinoma arising in a background of erosive chronic cholecystitis with high grade surface dysplasia and dense serosal adhesions.  -Tumor is present in perimuscular fibrous tissue (AJCC 8th edition pT2.) The surgical margins are uninvolved. Perineural invasion identified. No vascular invasion identified.   -Cholelithiasis. 9/28/2023 Initial Diagnosis    Adenocarcinoma of gallbladder ([facility])     10/13/2023 -  Cancer Staged    Staging form: Gallbladder, AJCC 8th Edition  - Clinical: cT2, cN0, cM0 - Signed by Shira Hough MD on 10/13/2023  Total positive nodes: 0           Interval history:    ROS: Review of Systems   Constitutional:  Positive for fatigue. Negative for chills and fever. HENT:  Negative for ear pain and sore throat. Eyes:  Negative for pain and visual disturbance. Respiratory:  Positive for shortness of breath. Negative for cough. Cardiovascular:  Negative for chest pain and palpitations. Gastrointestinal:  Positive for nausea. Negative for abdominal pain and vomiting. Genitourinary:  Negative for dysuria and hematuria. Musculoskeletal:  Negative for arthralgias and back pain. Skin:  Negative for color change and rash. Neurological:  Positive for numbness and headaches. Negative for seizures and syncope. Psychiatric/Behavioral:  Positive for sleep disturbance. All other systems reviewed and are negative.       Past Medical History:   Diagnosis Date    Diabetes mellitus ([facility])     Hypertension        Past Surgical History:   Procedure Laterality Date    CHOLECYSTECTOMY LAPAROSCOPIC N/A 9/2/2023    Procedure: CHOLECYSTECTOMY LAPAROSCOPIC;  Surgeon: Jamie Tuttle DO;  Location: [facility] [place] OR;  Service: General       Social History     Socioeconomic History    Marital status: /Civil Union     Spouse name: None    Number of children: None    Years of education: None    Highest education level: None   Occupational History    None   Tobacco Use    Smoking status: Never     Passive exposure: Never    Smokeless tobacco: Never   Vaping Use    Vaping Use: Never used   Substance and Sexual Activity    Alcohol use: Never Drug use: None    Sexual activity: None   Other Topics Concern    None   Social History Narrative    None     Social Determinants of Health     Financial Resource Strain: Not on file   Food Insecurity: No Food Insecurity (8/31/2023)    Hunger Vital Sign     Worried About Running Out of Food in the Last Year: Never true     Ran Out of Food in the Last Year: Never true   Transportation Needs: No Transportation Needs (8/31/2023)    PRAPARE - Transportation     Lack of Transportation (Medical): No     Lack of Transportation (Non-Medical): No   Physical Activity: Not on file   Stress: Not on file   Social Connections: Not on file   Intimate Partner Violence: Not on file   Housing Stability: Low Risk  (8/31/2023)    Housing Stability Vital Sign     Unable to Pay for Housing in the Last Year: No     Number of Places Lived in the Last Year: 2     Unstable Housing in the Last Year: No       History reviewed. No pertinent family history. Allergies   Allergen Reactions    Cortisone Chest Pain    Dye [Iodinated Contrast Media] Chest Pain    Trulicity [Dulaglutide] Bradycardia         Current Outpatient Medications:     acetaminophen (TYLENOL) 325 mg tablet, Take 2 tablets (650 mg total) by mouth every 6 (six) hours as needed for mild pain, headaches or fever, Disp: , Rfl: 0    ALPRAZolam (XANAX) 1 mg tablet, Take 1 tablet (1 mg total) by mouth see administration instructions Take 1 tablet 30 minutes prior to MRI, take second tablet immediately before MRI if needed. , Disp: 2 tablet, Rfl: 0    amLODIPine (NORVASC) 5 mg tablet, Take 5 mg by mouth daily at bedtime, Disp: , Rfl:     aspirin (ECOTRIN LOW STRENGTH) 81 mg EC tablet, Take 81 mg by mouth daily, Disp: , Rfl:     glipiZIDE (GLUCOTROL) 5 mg tablet, Take 10 mg by mouth 2 (two) times a day, Disp: , Rfl:     metoprolol tartrate (LOPRESSOR) 50 mg tablet, Take 50 mg by mouth daily, Disp: , Rfl:     sitaGLIPtin-metFORMIN (Janumet)  MG per tablet, Take 1 tablet by mouth 2 (two) times a day with meals, Disp: , Rfl:     temazepam (RESTORIL) 22.5 MG capsule, Take 30 mg by mouth daily at bedtime as needed for sleep, Disp: , Rfl:       Physical Exam:  /70 (BP Location: Right arm, Patient Position: Sitting, Cuff Size: Adult)   Pulse 84   Temp (!) 96.4 °F (35.8 °C)   Resp 18   Ht 5' 4" (1.626 m)   Wt 65.8 kg (145 lb)   SpO2 97%   BMI 24.89 kg/m²     Physical Exam  Constitutional:       General: She is not in acute distress. Appearance: She is well-developed. She is not diaphoretic. HENT:      Head: Normocephalic and atraumatic. Nose: Nose normal.   Eyes:      General: No scleral icterus. Right eye: No discharge. Left eye: No discharge. Conjunctiva/sclera: Conjunctivae normal.      Pupils: Pupils are equal, round, and reactive to light. Neck:      Thyroid: No thyromegaly. Vascular: No JVD. Trachea: No tracheal deviation. Cardiovascular:      Rate and Rhythm: Normal rate and regular rhythm. Heart sounds: Normal heart sounds. No murmur heard. No friction rub. Pulmonary:      Effort: Pulmonary effort is normal. No respiratory distress. Breath sounds: Normal breath sounds. No stridor. No wheezing or rales. Chest:      Chest wall: No tenderness. Abdominal:      General: There is no distension. Palpations: Abdomen is soft. There is no hepatomegaly or splenomegaly. Tenderness: There is abdominal tenderness (On palpation in the right upper quadrant area). There is no guarding or rebound. Musculoskeletal:         General: No tenderness or deformity. Normal range of motion. Cervical back: Normal range of motion and neck supple. Lymphadenopathy:      Cervical: No cervical adenopathy. Skin:     General: Skin is warm and dry. Coloration: Skin is not pale. Findings: No erythema or rash. Neurological:      Mental Status: She is alert and oriented to person, place, and time. Cranial Nerves:  No cranial nerve deficit. Coordination: Coordination normal.      Deep Tendon Reflexes: Reflexes are normal and symmetric. Psychiatric:         Behavior: Behavior normal.         Thought Content: Thought content normal.         Judgment: Judgment normal.           Labs:  Lab Results   Component Value Date    WBC 8.60 09/06/2023    HGB 9.6 (L) 09/06/2023    HCT 29.4 (L) 09/06/2023    MCV 92 09/06/2023     09/06/2023     Lab Results   Component Value Date    K 3.8 09/06/2023     09/06/2023    CO2 27 09/06/2023    BUN 7 09/06/2023    CREATININE 0.52 (L) 09/06/2023    GLUF 283 (H) 08/31/2023    CALCIUM 8.5 09/06/2023    CORRECTEDCA 8.5 09/02/2023    AST 11 (L) 09/02/2023    ALT 19 09/02/2023    ALKPHOS 56 09/02/2023    EGFR 93 09/06/2023     No results found for: "TSH"    Patient voiced understanding and agreement in the above discussion. Aware to contact our office with questions/symptoms in the interim.

## 2023-10-13 NOTE — PROGRESS NOTES
Surgical Oncology Consult       [facility]  [address]  [place] [address]    Quinton Grad De Charity  1950  [id number]  [facility]  [address]  [place] [address]    Diagnoses and all orders for this visit:    Adenocarcinoma of gallbladder [facility])  -     Ambulatory Referral to Surgical Oncology  -     MRI abdomen w wo contrast; Future        Chief Complaint   Patient presents with    Advice Only       Return in about 1 week (around 10/20/2023) for Office Visit, Imaging - See orders. Oncology History   Adenocarcinoma of gallbladder ([address] )   9/2/2023 Surgery    Laparoscopic cholecystectomy:  Gallbladder, Gallbladder and contents , cholecystectomy:  -Moderately differentiated adenocarcinoma arising in a background of erosive chronic cholecystitis with high grade surface dysplasia and dense serosal adhesions.  -Tumor is present in perimuscular fibrous tissue (AJCC 8th edition pT2.) The surgical margins are uninvolved. Perineural invasion identified. No vascular invasion identified.   -Cholelithiasis. 9/28/2023 Initial Diagnosis    Adenocarcinoma of gallbladder (HCC)         History of Present Illness: 75-year-old female who presented with abdominal pain. CT without contrast on August 30, 2023 revealed no acute findings. Right upper quadrant ultrasound on September 1, 2023 revealed abnormal gallbladder with gallbladder wall thickening. I personally reviewed her films. She underwent laparoscopic cholecystectomy on September 2, 2023. The gallbladder was decompressed and there was purulent fluid removed. The specimen was brought out through an Endobag. Pathology revealed moderately differentiated adenocarcinoma with high-grade dysplasia. This was T2. Margins were negative. There was perineural invasion.   She comes in now to discuss further therapy. No significant abdominal pain. Her weight has been stable over the last 6 months. She is still recovering from surgery and her appetite is not completely back to normal.    Review of Systems  Complete ROS Surg Onc:   Constitutional: The patient denies new or recent history of general fatigue, no recent weight loss, no change in appetite. Eyes: No complaints of visual problems, no scleral icterus. ENT: no complaints of ear pain, no hoarseness, no difficulty swallowing,  no tinnitus and no new masses in head, oral cavity, or neck. Cardiovascular: No complaints of chest pain, no palpitations, no ankle edema. Respiratory: No complaints of shortness of breath, no cough. Gastrointestinal: No complaints of jaundice, no bloody stools, no pale stools. Genitourinary: No complaints of dysuria, no hematuria, no nocturia, no frequent urination, no urethral discharge. Musculoskeletal: No complaints of weakness, paralysis, joint stiffness or arthralgias. Integumentary: No complaints of rash, no new lesions. Neurological: No complaints of convulsions, no seizures, no dizziness. Hematologic/Lymphatic: No complaints of easy bruising. Endocrine:  No hot or cold intolerance. No polydipsia, polyphagia, or polyuria. Allergy/immunology:  No environmental allergies. No food allergies. Not immunocompromised. Skin:  No pallor or rash. No wound.           Patient Active Problem List   Diagnosis    Acute cholecystitis    Near syncope    Diabetes (HCC)    Hypertension    Elevated TSH    Hyperglycemia    Lightheadedness    Elevated d-dimer    Sepsis ([address])    Acute blood loss anemia    Adenocarcinoma of gallbladder ([address])     Past Medical History:   Diagnosis Date    Diabetes mellitus ([address])     Hypertension      Past Surgical History:   Procedure Laterality Date    CHOLECYSTECTOMY LAPAROSCOPIC N/A 9/2/2023    Procedure: CHOLECYSTECTOMY LAPAROSCOPIC;  Surgeon: Cory Newton DO;  Location: [facility]; Service: General     No family history on file. Social History     Socioeconomic History    Marital status: /Civil Union     Spouse name: Not on file    Number of children: Not on file    Years of education: Not on file    Highest education level: Not on file   Occupational History    Not on file   Tobacco Use    Smoking status: Never     Passive exposure: Never    Smokeless tobacco: Never   Vaping Use    Vaping Use: Never used   Substance and Sexual Activity    Alcohol use: Never    Drug use: Not on file    Sexual activity: Not on file   Other Topics Concern    Not on file   Social History Narrative    Not on file     Social Determinants of Health     Financial Resource Strain: Not on file   Food Insecurity: No Food Insecurity (8/31/2023)    Hunger Vital Sign     Worried About Running Out of Food in the Last Year: Never true     Ran Out of Food in the Last Year: Never true   Transportation Needs: No Transportation Needs (8/31/2023)    PRAPARE - Transportation     Lack of Transportation (Medical): No     Lack of Transportation (Non-Medical):  No   Physical Activity: Not on file   Stress: Not on file   Social Connections: Not on file   Intimate Partner Violence: Not on file   Housing Stability: Low Risk  (8/31/2023)    Housing Stability Vital Sign     Unable to Pay for Housing in the Last Year: No     Number of Places Lived in the Last Year: 2     Unstable Housing in the Last Year: No       Current Outpatient Medications:     acetaminophen (TYLENOL) 325 mg tablet, Take 2 tablets (650 mg total) by mouth every 6 (six) hours as needed for mild pain, headaches or fever, Disp: , Rfl: 0    amLODIPine (NORVASC) 5 mg tablet, Take 5 mg by mouth daily at bedtime, Disp: , Rfl:     aspirin (ECOTRIN LOW STRENGTH) 81 mg EC tablet, Take 81 mg by mouth daily, Disp: , Rfl:     glipiZIDE (GLUCOTROL) 5 mg tablet, Take 10 mg by mouth 2 (two) times a day, Disp: , Rfl:     metoprolol tartrate (LOPRESSOR) 50 mg tablet, Take 50 mg by mouth daily, Disp: , Rfl:     sitaGLIPtin-metFORMIN (Janumet)  MG per tablet, Take 1 tablet by mouth 2 (two) times a day with meals, Disp: , Rfl:     temazepam (RESTORIL) 22.5 MG capsule, Take 30 mg by mouth daily at bedtime as needed for sleep, Disp: , Rfl:   Allergies   Allergen Reactions    Cortisone Chest Pain    Dye [Iodinated Contrast Media] Chest Pain    Trulicity [Dulaglutide] Bradycardia     Vitals:    10/13/23 0857   BP: 114/78   Pulse: 80   Resp: 18   Temp: (!) 97.3 °F (36.3 °C)   SpO2: 99%       Physical Exam   Constitutional: General appearance: The Patient is well-developed and well-nourished who appears the stated age in no acute distress. Patient is pleasant and talkative. HEENT:  Normocephalic. Sclerae are anicteric. Mucous membranes are moist. Neck is supple without adenopathy. No JVD. Chest: The lungs are clear to auscultation. Cardiac: Heart is regular rate. Abdomen: Abdomen is soft, non-tender, non-distended and without masses. Extremities: There is no clubbing or cyanosis. There is no edema. Symmetric. Neuro: Grossly nonfocal. Gait is normal.     Lymphatic: No evidence of cervical adenopathy bilaterally. No evidence of axillary adenopathy bilaterally. No evidence of inguinal adenopathy bilaterally. Skin: Warm, anicteric. Psych:  Patient is pleasant and talkative. Breasts:      Pathology:  Final Diagnosis   A. 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  -Tumor is present in perimuscular fibrous tissue (AJCC 8th edition pT2.) The surgical margins are uninvolved. Perineural invasion identified. No vascular invasion identified. See comment.  -Cholelithiasis. Comment  This case was  sent out to BRIAN Gonzalez M.D., and an expert in gastrointestinal pathology at [facility] Pathology consultation Service and  signed out by her associate Dr Melvern Cabot. The above mentioned diagnosis is exactly his consultation report diagnosis. A copy of this consultation report is on file at [facility] department of pathology. Please see his full consultation report in the notes section. Interpretation performed at [address] [place] [address], [address] [place] [address]   Dr Trina Ovalle, was notified by [facility] Text on 9/25/23 at 3:40 PM and a copy of this report was emailed to his Office at the same time     Electronically signed by Davion Lopez MD on 9/25/2023 at  3:53 PM   Note    [address] AMAYA/DR. BARTOLO RUBIN    - DIAGNOSIS     A. Gallbladder, gallbladder and contents:     Moderately differentiated adenocarcinoma arising in a background of erosive chronic cholecystitis with high grade surface dysplasia and dense serosal adhesions. Tumor is present in perimuscular fibrous tissue (AJCC 8th edition pT2.) The surgical margins are uninvolved. Perineural invasion identified. No vascular invasion identified. See comment. Thank you for sharing this case. Dr. Chapito Klein is currently away. She has designated colleague Melvern Cabot to sign out directed consults during her absence so that results are reported in a timely manner. Dr. Prashanth Smith is a GI pathologist who has worked extensively with Dr. Chapito Klein at [facility]. Jailyn Damon MD     Clinical data  Op notes   Operative Findings:  Gallbladder was acutely inflamed and distended with significant wall thickening  There were multiple adhesions from the surrounding tissue to the gallbladder which were taken down meticulously using electrocautery and blunt dissection  Gallbladder needed to be decompressed to be grasped, purulent hydrops was noted     MCK, P53, KI-67 stains used in evaluation.   Controls reacted appropriately Interpretation performed at [facility], [address], [address] [place] [address]    Additional Information    All reported additional testing was performed with appropriately reactive controls. These tests were developed and their performance characteristics determined by [facility] or appropriate performing facility, though some tests may be performed on tissues which have not been validated for performance characteristics (such as staining performed on alcohol exposed cell blocks and decalcified tissues). Results should be interpreted with caution and in the context of the patients’ clinical condition. These tests may not be cleared or approved by the U.S. Food and Drug Administration, though the FDA has determined that such clearance or approval is not necessary. These tests are used for clinical purposes and they should not be regarded as investigational or for research. This laboratory has been approved by [place] [address], designated as a high-complexity laboratory and is qualified to perform these tests.   .   Synoptic Checklist   GALLBLADDER   8th Edition - Protocol posted: 6/30/2021GALLBLADDER: RESECTION/CHOLECYSTECTOMY - All Specimens  SPECIMEN   Procedure  Simple cholecystectomy   TUMOR   Tumor Site  Cannot be determined   Histologic Type  Adenocarcinoma, not otherwise specified (NOS)   Histologic Grade  G2, moderately differentiated   Tumor Size  Cannot be determined:  microscopic foci of tumor     Tumor Extent  Invades perimuscular connective tissue on the peritoneal side without serosal involvement   Lymphovascular Invasion  Not identified   Perineural Invasion  Present   MARGINS   Margin Status for Invasive Carcinoma  All margins negative for invasive carcinoma   Distance from Invasive Carcinoma to Cystic Duct Margin    more than 5 mm mm   Margin Status for Intraepithelial Neoplasia  All margins negative for high-grade intraepithelial neoplasia   REGIONAL LYMPH NODES   Regional Lymph Node Status  Not applicable (no regional lymph nodes submitted or found)   PATHOLOGIC STAGE CLASSIFICATION (pTNM, AJCC 8th Edition)   Reporting of pT, pN, and (when applicable) pM categories is based on information available to the pathologist at the time the report is issued. As per the AJCC (Chapter 1, 8th Ed.) it is the managing physician’s responsibility to establish the final pathologic stage based upon all pertinent information, including but potentially not limited to this pathology report. pT Category  pT2a   pN Category  pN not assigned (no nodes submitted or found)   ADDITIONAL FINDINGS   Additional Findings  Cholelithiasis     Chronic cholecystitis   . Labs:      Imaging  No results found. I personally reviewed and interpreted the above laboratory and imaging data. Discussion/Summary: 57-year-old female with a clinical T2N0M0 gallbladder carcinoma. There is no evidence of distant metastasis on her chest CT. I have recommended obtaining an MRI of the liver to make sure there is no significant intrahepatic tumor burden. If this is negative, I discussed the standard recommendation would be segment 4B/5 liver resection with portal lymph node dissection. I discussed that adjuvant chemoradiation would be recommended after surgery. If there is distant disease, chemotherapy would be the mainstay of treatment. I will plan on obtaining the MRI at this time. I will see her back once we have the results of the MRI to finalize her treatment plans. She is agreeable to this plan. All of her and her family's questions were answered. [place]

## 2023-10-13 NOTE — LETTER
October 13, 2023     Cory Newton DO  [facility]  [address]  [facility]    Patient: Darci Hedrick   YOB: 1950   Date of Visit: 10/13/2023       Dear Dr. Fahad Stevens: Thank you for referring Darci Hedrick to me for evaluation. Below are my notes for this consultation. If you have questions, please do not hesitate to call me. I look forward to following your patient along with you. Sincerely,        Joe Hammer MD        CC: MD Joe Ceballos MD  10/13/2023  9:38 AM  Incomplete               Surgical Oncology Consult       [address] [facility]Y [facility]  [address] [facility]  [address] Charity  1950  [id number]  [address] [facility]  [address] [facility] [place] [address]    Diagnoses and all orders for this visit:    Adenocarcinoma of gallbladder [facility])  -     Ambulatory Referral to Surgical Oncology  -     MRI abdomen w wo contrast; Future        Chief Complaint   Patient presents with   • Advice Only       Return in about 1 week (around 10/20/2023) for Office Visit, Imaging - See orders. Oncology History   Adenocarcinoma of gallbladder ([address])   9/2/2023 Surgery    Laparoscopic cholecystectomy:  Gallbladder, Gallbladder and contents , cholecystectomy:  -Moderately differentiated adenocarcinoma arising in a background of erosive chronic cholecystitis with high grade surface dysplasia and dense serosal adhesions.  -Tumor is present in perimuscular fibrous tissue (AJCC 8th edition pT2.) The surgical margins are uninvolved. Perineural invasion identified. No vascular invasion identified.   -Cholelithiasis. 9/28/2023 Initial Diagnosis    Adenocarcinoma of gallbladder (HCC)         History of Present Illness: 68-year-old female who presented with abdominal pain.  CT without contrast on August 30, 2023 revealed no acute findings. Right upper quadrant ultrasound on September 1, 2023 revealed abnormal gallbladder with gallbladder wall thickening. I personally reviewed her films. She underwent laparoscopic cholecystectomy on September 2, 2023. The gallbladder was decompressed and there was purulent fluid removed. The specimen was brought out through an Endobag. Pathology revealed moderately differentiated adenocarcinoma with high-grade dysplasia. This was T2. Margins were negative. There was perineural invasion. She comes in now to discuss further therapy. No significant abdominal pain. Her weight has been stable over the last 6 months. She is still recovering from surgery and her appetite is not completely back to normal.    Review of Systems  Complete ROS Surg Onc:   Constitutional: The patient denies new or recent history of general fatigue, no recent weight loss, no change in appetite. Eyes: No complaints of visual problems, no scleral icterus. ENT: no complaints of ear pain, no hoarseness, no difficulty swallowing,  no tinnitus and no new masses in head, oral cavity, or neck. Cardiovascular: No complaints of chest pain, no palpitations, no ankle edema. Respiratory: No complaints of shortness of breath, no cough. Gastrointestinal: No complaints of jaundice, no bloody stools, no pale stools. Genitourinary: No complaints of dysuria, no hematuria, no nocturia, no frequent urination, no urethral discharge. Musculoskeletal: No complaints of weakness, paralysis, joint stiffness or arthralgias. Integumentary: No complaints of rash, no new lesions. Neurological: No complaints of convulsions, no seizures, no dizziness. Hematologic/Lymphatic: No complaints of easy bruising. Endocrine:  No hot or cold intolerance. No polydipsia, polyphagia, or polyuria. Allergy/immunology:  No environmental allergies. No food allergies. Not immunocompromised. Skin:  No pallor or rash.   No wound. Patient Active Problem List   Diagnosis   • Acute cholecystitis   • Near syncope   • Diabetes ([address])   • Hypertension   • Elevated TSH   • Hyperglycemia   • Lightheadedness   • Elevated d-dimer   • Sepsis ([address])   • Acute blood loss anemia   • Adenocarcinoma of gallbladder (HCC)     Past Medical History:   Diagnosis Date   • Diabetes mellitus ([address])    • Hypertension      Past Surgical History:   Procedure Laterality Date   • CHOLECYSTECTOMY LAPAROSCOPIC N/A 9/2/2023    Procedure: CHOLECYSTECTOMY LAPAROSCOPIC;  Surgeon: Kam Cuba DO;  Location: [facility] OR;  Service: General     No family history on file. Social History     Socioeconomic History   • Marital status: /Civil Union     Spouse name: Not on file   • Number of children: Not on file   • Years of education: Not on file   • Highest education level: Not on file   Occupational History   • Not on file   Tobacco Use   • Smoking status: Never     Passive exposure: Never   • Smokeless tobacco: Never   Vaping Use   • Vaping Use: Never used   Substance and Sexual Activity   • Alcohol use: Never   • Drug use: Not on file   • Sexual activity: Not on file   Other Topics Concern   • Not on file   Social History Narrative   • Not on file     Social Determinants of Health     Financial Resource Strain: Not on file   Food Insecurity: No Food Insecurity (8/31/2023)    Hunger Vital Sign    • Worried About Running Out of Food in the Last Year: Never true    • Ran Out of Food in the Last Year: Never true   Transportation Needs: No Transportation Needs (8/31/2023)    PRAPARE - Transportation    • Lack of Transportation (Medical): No    • Lack of Transportation (Non-Medical):  No   Physical Activity: Not on file   Stress: Not on file   Social Connections: Not on file   Intimate Partner Violence: Not on file   Housing Stability: Low Risk  (8/31/2023)    Housing Stability Vital Sign    • Unable to Pay for Housing in the Last Year: No    • Number of Places Lived in the Last Year: 2    • Unstable Housing in the Last Year: No       Current Outpatient Medications:   •  acetaminophen (TYLENOL) 325 mg tablet, Take 2 tablets (650 mg total) by mouth every 6 (six) hours as needed for mild pain, headaches or fever, Disp: , Rfl: 0  •  amLODIPine (NORVASC) 5 mg tablet, Take 5 mg by mouth daily at bedtime, Disp: , Rfl:   •  aspirin (ECOTRIN LOW STRENGTH) 81 mg EC tablet, Take 81 mg by mouth daily, Disp: , Rfl:   •  glipiZIDE (GLUCOTROL) 5 mg tablet, Take 10 mg by mouth 2 (two) times a day, Disp: , Rfl:   •  metoprolol tartrate (LOPRESSOR) 50 mg tablet, Take 50 mg by mouth daily, Disp: , Rfl:   •  sitaGLIPtin-metFORMIN (Janumet)  MG per tablet, Take 1 tablet by mouth 2 (two) times a day with meals, Disp: , Rfl:   •  temazepam (RESTORIL) 22.5 MG capsule, Take 30 mg by mouth daily at bedtime as needed for sleep, Disp: , Rfl:   Allergies   Allergen Reactions   • Cortisone Chest Pain   • Dye [Iodinated Contrast Media] Chest Pain   • Trulicity [Dulaglutide] Bradycardia     Vitals:    10/13/23 0857   BP: 114/78   Pulse: 80   Resp: 18   Temp: (!) 97.3 °F (36.3 °C)   SpO2: 99%       Physical Exam   Constitutional: General appearance: The Patient is well-developed and well-nourished who appears the stated age in no acute distress. Patient is pleasant and talkative. HEENT:  Normocephalic. Sclerae are anicteric. Mucous membranes are moist. Neck is supple without adenopathy. No JVD. Chest: The lungs are clear to auscultation. Cardiac: Heart is regular rate. Abdomen: Abdomen is soft, non-tender, non-distended and without masses. Extremities: There is no clubbing or cyanosis. There is no edema. Symmetric. Neuro: Grossly nonfocal. Gait is normal.     Lymphatic: No evidence of cervical adenopathy bilaterally. No evidence of axillary adenopathy bilaterally. No evidence of inguinal adenopathy bilaterally. Skin: Warm, anicteric.     Psych:  Patient is pleasant and talkative. Breasts:      Pathology:  [unfilled]    Labs:      Imaging  No results found. I personally reviewed and interpreted the above laboratory and imaging data.     Discussion/Summary: 18-year-old female with a clinical T2N0M0 gallbladder carcinoma

## 2023-10-16 ENCOUNTER — HOSPITAL ENCOUNTER (OUTPATIENT)
Dept: MRI IMAGING | Facility: HOSPITAL | Age: 73
Discharge: HOME | End: 2023-10-16
Attending: SURGERY
Payer: COMMERCIAL

## 2023-10-16 DIAGNOSIS — C23 ADENOCARCINOMA OF GALLBLADDER (HCC): ICD-10-CM

## 2023-10-16 PROCEDURE — G1004 CDSM NDSC: HCPCS

## 2023-10-16 PROCEDURE — 74181 MRI ABDOMEN W/O CONTRAST: CPT

## 2023-10-19 ENCOUNTER — OFFICE VISIT (OUTPATIENT)
Dept: SURGICAL ONCOLOGY | Facility: CLINIC | Age: 73
End: 2023-10-19
Payer: COMMERCIAL

## 2023-10-19 ENCOUNTER — PREP FOR PROCEDURE (OUTPATIENT)
Dept: INTERVENTIONAL RADIOLOGY/VASCULAR | Facility: CLINIC | Age: 73
End: 2023-10-19

## 2023-10-19 VITALS
WEIGHT: 147.4 LBS | BODY MASS INDEX: 25.16 KG/M2 | HEIGHT: 64 IN | SYSTOLIC BLOOD PRESSURE: 110 MMHG | DIASTOLIC BLOOD PRESSURE: 82 MMHG | OXYGEN SATURATION: 98 % | HEART RATE: 74 BPM

## 2023-10-19 DIAGNOSIS — R16.0 LIVER MASS: Primary | ICD-10-CM

## 2023-10-19 DIAGNOSIS — C23 ADENOCARCINOMA OF GALLBLADDER (HCC): Primary | ICD-10-CM

## 2023-10-19 PROCEDURE — 99214 OFFICE O/P EST MOD 30 MIN: CPT | Performed by: SURGERY

## 2023-10-19 NOTE — H&P (VIEW-ONLY)
Surgical Oncology Follow Up       [facility] [address] [facility] [address]  [facility]  [address]  [facility] [address] [address] [facility]  1950  [id number]  [facility] ONC [facility] [place]  [facility] [address]  [address]  [phone]    Diagnoses and all orders for this visit:    Adenocarcinoma of gallbladder [facility])  -     Ambulatory Referral to Interventional Radiology; Future        Chief Complaint   Patient presents with    Follow-up     1 wk f/u Adeno of gallbladder - MRI 10/16         Return in about 2 weeks (around 11/2/2023) for Office Visit, Labs - See Treatment Plan. Oncology History   Adenocarcinoma of gallbladder ([address])   9/2/2023 Surgery    Laparoscopic cholecystectomy:  Gallbladder, Gallbladder and contents , cholecystectomy:  -Moderately differentiated adenocarcinoma arising in a background of erosive chronic cholecystitis with high grade surface dysplasia and dense serosal adhesions.  -Tumor is present in perimuscular fibrous tissue (AJCC 8th edition pT2.) The surgical margins are uninvolved. Perineural invasion identified. No vascular invasion identified.   -Cholelithiasis. 9/28/2023 Initial Diagnosis    Adenocarcinoma of gallbladder ([address])     10/13/2023 -  Cancer Staged    Staging form: Gallbladder, AJCC 8th Edition  - Clinical: cT2, cN0, cM0 - Signed by Jaret Wells MD on 10/13/2023  Total positive nodes: 0       10/19/2023 -  Cancer Staged    Staging form: Gallbladder, AJCC 8th Edition  - Pathologic stage from 10/19/2023: Stage IVB (pT2, pN0, cM1) - Signed by Jaret Wells MD on 10/19/2023  Stage prefix: Initial diagnosis  Total positive nodes: 0               History of Present Illness: Patient returns in follow-up. She is still having some right-sided abdominal discomfort.   MRI shows multiple hepatic lesions that are suspicious for metastasis. I personally reviewed the films. Review of Systems  Complete ROS Surg Onc:   Complete ROS Surg Onc:   Constitutional: The patient denies new or recent history of general fatigue, no recent weight loss, no change in appetite. Eyes: No complaints of visual problems, no scleral icterus. ENT: no complaints of ear pain, no hoarseness, no difficulty swallowing,  no tinnitus and no new masses in head, oral cavity, or neck. Cardiovascular: No complaints of chest pain, no palpitations, no ankle edema. Respiratory: No complaints of shortness of breath, no cough. Gastrointestinal: No complaints of jaundice, no bloody stools, no pale stools. Genitourinary: No complaints of dysuria, no hematuria, no nocturia, no frequent urination, no urethral discharge. Musculoskeletal: No complaints of weakness, paralysis, joint stiffness or arthralgias. Integumentary: No complaints of rash, no new lesions. Neurological: No complaints of convulsions, no seizures, no dizziness. Hematologic/Lymphatic: No complaints of easy bruising. Endocrine:  No hot or cold intolerance. No polydipsia, polyphagia, or polyuria. Allergy/immunology:  No environmental allergies. No food allergies. Not immunocompromised. Skin:  No pallor or rash. No wound. Patient Active Problem List   Diagnosis    Acute cholecystitis    Near syncope    Diabetes (HCC)    Hypertension    Elevated TSH    Hyperglycemia    Lightheadedness    Elevated d-dimer    Sepsis ([address])    Acute blood loss anemia    Adenocarcinoma of gallbladder ([address])     Past Medical History:   Diagnosis Date    Diabetes mellitus ([address])     Hypertension      Past Surgical History:   Procedure Laterality Date    CHOLECYSTECTOMY LAPAROSCOPIC N/A 9/2/2023    Procedure: CHOLECYSTECTOMY LAPAROSCOPIC;  Surgeon: Miguel Lebron DO;  Location: [facility];  Service: General     No family history on file.   Social History     Socioeconomic History    Marital status: /Civil Union     Spouse name: Not on file    Number of children: Not on file    Years of education: Not on file    Highest education level: Not on file   Occupational History    Not on file   Tobacco Use    Smoking status: Never     Passive exposure: Never    Smokeless tobacco: Never   Vaping Use    Vaping Use: Never used   Substance and Sexual Activity    Alcohol use: Never    Drug use: Not on file    Sexual activity: Not on file   Other Topics Concern    Not on file   Social History Narrative    Not on file     Social Determinants of Health     Financial Resource Strain: Not on file   Food Insecurity: No Food Insecurity (8/31/2023)    Hunger Vital Sign     Worried About Running Out of Food in the Last Year: Never true     Ran Out of Food in the Last Year: Never true   Transportation Needs: No Transportation Needs (8/31/2023)    PRAPARE - Transportation     Lack of Transportation (Medical): No     Lack of Transportation (Non-Medical): No   Physical Activity: Not on file   Stress: Not on file   Social Connections: Not on file   Intimate Partner Violence: Not on file   Housing Stability: Low Risk  (8/31/2023)    Housing Stability Vital Sign     Unable to Pay for Housing in the Last Year: No     Number of Places Lived in the Last Year: 2     Unstable Housing in the Last Year: No       Current Outpatient Medications:     acetaminophen (TYLENOL) 325 mg tablet, Take 2 tablets (650 mg total) by mouth every 6 (six) hours as needed for mild pain, headaches or fever, Disp: , Rfl: 0    ALPRAZolam (XANAX) 1 mg tablet, Take 1 tablet (1 mg total) by mouth see administration instructions Take 1 tablet 30 minutes prior to MRI, take second tablet immediately before MRI if needed. , Disp: 2 tablet, Rfl: 0    amLODIPine (NORVASC) 5 mg tablet, Take 5 mg by mouth daily at bedtime, Disp: , Rfl:     aspirin (ECOTRIN LOW STRENGTH) 81 mg EC tablet, Take 81 mg by mouth daily, Disp: , Rfl:     glipiZIDE (GLUCOTROL) 5 mg tablet, Take 10 mg by mouth 2 (two) times a day, Disp: , Rfl:     metoprolol tartrate (LOPRESSOR) 50 mg tablet, Take 50 mg by mouth daily, Disp: , Rfl:     sitaGLIPtin-metFORMIN (Janumet)  MG per tablet, Take 1 tablet by mouth 2 (two) times a day with meals, Disp: , Rfl:     temazepam (RESTORIL) 22.5 MG capsule, Take 30 mg by mouth daily at bedtime as needed for sleep, Disp: , Rfl:   Allergies   Allergen Reactions    Cortisone Chest Pain    Dye [Iodinated Contrast Media] Chest Pain    Trulicity [Dulaglutide] Bradycardia     Vitals:    10/19/23 1336   BP: 110/82   Pulse: 74   SpO2: 98%       Physical Exam  Constitutional: General appearance: The Patient is well-developed and well-nourished who appears the stated age in no acute distress. Patient is pleasant and talkative. HEENT:  Normocephalic. Sclerae are anicteric. Mucous membranes are moist. Neck is supple without adenopathy. No JVD. Abdomen: Abdomen is soft, tender on the right mid abdomen, non-distended and without masses. Extremities: There is no clubbing or cyanosis. There is no edema. Symmetric. Neuro: Grossly nonfocal. Gait is normal.     Lymphatic: No evidence of cervical adenopathy bilaterally. Skin: Warm, anicteric. Psych:  Patient is pleasant and talkative. Breasts:        Pathology:  [unfilled]    Labs:      Imaging  MRI abdomen wo contrast    Result Date: 10/18/2023  Narrative: MRI - ABDOMEN - WITHOUT CONTRAST INDICATION: 74 years / Female  C23: Malignant neoplasm of gallbladder. COMPARISON: TECHNIQUE:  Multiplanar/multisequence MRI of the abdomen was performed without the administration of contrast. IV Contrast: FINDINGS: LOWER CHEST:   Unremarkable. LIVER: Normal in size and configuration. Multiple hepatic lesions measuring up to 2.3 cm are identified which demonstrate T2 hyperintensity and restricted diffusion suspicious for metastases.  Limited evaluation of the hepatic veins and portal veins on this non-contrast MR is unremarkable. BILE DUCTS: No intrahepatic or extrahepatic bile duct dilation. GALLBLADDER: Patient is status post cholecystectomy. PANCREAS:  Unremarkable. ADRENAL GLANDS:  Unremarkable. SPLEEN:  Normal. KIDNEYS/PROXIMAL URETERS: No hydroureteronephrosis. No suspicious renal mass. BOWEL:   No dilated loops of bowel. PERITONEUM/RETROPERITONEUM: No mass. No ascites. LYMPH NODES: No abdominal lymphadenopathy. VASCULAR STRUCTURES:  No aneurysm. ABDOMINAL WALL:  Unremarkable. OSSEOUS STRUCTURES:  No suspicious osseous lesion. Impression: Interval cholecystectomy. Multiple hepatic mass lesions suspicious for metastases. Workstation performed: [id number]     I personally reviewed and interpreted the above laboratory and imaging data. Discussion/Summary: 25-year-old female with a history of a T2 gallbladder carcinoma. Her MRI is certainly concerning of liver metastasis. Despite the negative portal adenopathy, I would recommend that she undergo liver biopsy. We discussed that if this is malignant, the mainstay of treatment would be chemotherapy. If this is benign, we would proceed with either laparoscopic liver biopsy to ensure there was no sampling error or proceed with segment 4B/5 liver resection with portal lymph node dissection. I will see her back once we have the results of the IR biopsy. I have already reached out to IR to contact her son for an expedited appointment. All of her and her family's questions were answered.

## 2023-10-19 NOTE — LETTER
October 19, 2023     Miguelina Fernandes, [address] [facility] [address]  [address] [place] [address]    Patient: Roxanna Mcdonald   YOB: 1950   Date of Visit: 10/19/2023       Dear Dr. Flako Anne: Thank you for referring Roxanna Mcdonald to me for evaluation. Below are my notes for this consultation. If you have questions, please do not hesitate to call me. I look forward to following your patient along with you. Sincerely,        Meghann Villanueva MD        CC: DO Meghann East MD  10/19/2023  2:47 PM  Sign when Signing Visit               Surgical Oncology Follow Up       [facility] ONC [facility] SURGICAL ONCOLOGY [address] [id number]  [address] [place] [address]  1950  [id number]  [facility] [address] [facility] SURGICAL ONCOLOGY [address]  [phone]    Diagnoses and all orders for this visit:    Adenocarcinoma of gallbladder [facility])  -     Ambulatory Referral to Interventional Radiology; Future        Chief Complaint   Patient presents with   • Follow-up     1 wk f/u Adeno of gallbladder - MRI 10/16         Return in about 2 weeks (around 11/2/2023) for Office Visit, Labs - See Treatment Plan. Oncology History   Adenocarcinoma of gallbladder ([address] )   9/2/2023 Surgery    Laparoscopic cholecystectomy:  Gallbladder, Gallbladder and contents , cholecystectomy:  -Moderately differentiated adenocarcinoma arising in a background of erosive chronic cholecystitis with high grade surface dysplasia and dense serosal adhesions.  -Tumor is present in perimuscular fibrous tissue (AJCC 8th edition pT2.) The surgical margins are uninvolved. Perineural invasion identified. No vascular invasion identified.   -Cholelithiasis.      9/28/2023 Initial Diagnosis    Adenocarcinoma of gallbladder ([address] ) 10/13/2023 -  Cancer Staged    Staging form: Gallbladder, AJCC 8th Edition  - Clinical: cT2, cN0, cM0 - Signed by Rachael Artis MD on 10/13/2023  Total positive nodes: 0       10/19/2023 -  Cancer Staged    Staging form: Gallbladder, AJCC 8th Edition  - Pathologic stage from 10/19/2023: Stage IVB (pT2, pN0, cM1) - Signed by Rachael Artis MD on 10/19/2023  Stage prefix: Initial diagnosis  Total positive nodes: 0               History of Present Illness: Patient returns in follow-up. She is still having some right-sided abdominal discomfort. MRI shows multiple hepatic lesions that are suspicious for metastasis. I personally reviewed the films. Review of Systems  Complete ROS Surg Onc:   Complete ROS Surg Onc:   Constitutional: The patient denies new or recent history of general fatigue, no recent weight loss, no change in appetite. Eyes: No complaints of visual problems, no scleral icterus. ENT: no complaints of ear pain, no hoarseness, no difficulty swallowing,  no tinnitus and no new masses in head, oral cavity, or neck. Cardiovascular: No complaints of chest pain, no palpitations, no ankle edema. Respiratory: No complaints of shortness of breath, no cough. Gastrointestinal: No complaints of jaundice, no bloody stools, no pale stools. Genitourinary: No complaints of dysuria, no hematuria, no nocturia, no frequent urination, no urethral discharge. Musculoskeletal: No complaints of weakness, paralysis, joint stiffness or arthralgias. Integumentary: No complaints of rash, no new lesions. Neurological: No complaints of convulsions, no seizures, no dizziness. Hematologic/Lymphatic: No complaints of easy bruising. Endocrine:  No hot or cold intolerance. No polydipsia, polyphagia, or polyuria. Allergy/immunology:  No environmental allergies. No food allergies. Not immunocompromised. Skin:  No pallor or rash. No wound.         Patient Active Problem List   Diagnosis   • Acute cholecystitis • Near syncope   • Diabetes ([address])   • Hypertension   • Elevated TSH   • Hyperglycemia   • Lightheadedness   • Elevated d-dimer   • Sepsis ([address])   • Acute blood loss anemia   • Adenocarcinoma of gallbladder (HCC)     Past Medical History:   Diagnosis Date   • Diabetes mellitus ([address])    • Hypertension      Past Surgical History:   Procedure Laterality Date   • CHOLECYSTECTOMY LAPAROSCOPIC N/A 9/2/2023    Procedure: CHOLECYSTECTOMY LAPAROSCOPIC;  Surgeon: Monica Kilpatrick DO;  Location: [facility];  Service: General     No family history on file. Social History     Socioeconomic History   • Marital status: /Civil Union     Spouse name: Not on file   • Number of children: Not on file   • Years of education: Not on file   • Highest education level: Not on file   Occupational History   • Not on file   Tobacco Use   • Smoking status: Never     Passive exposure: Never   • Smokeless tobacco: Never   Vaping Use   • Vaping Use: Never used   Substance and Sexual Activity   • Alcohol use: Never   • Drug use: Not on file   • Sexual activity: Not on file   Other Topics Concern   • Not on file   Social History Narrative   • Not on file     Social Determinants of Health     Financial Resource Strain: Not on file   Food Insecurity: No Food Insecurity (8/31/2023)    Hunger Vital Sign    • Worried About Running Out of Food in the Last Year: Never true    • Ran Out of Food in the Last Year: Never true   Transportation Needs: No Transportation Needs (8/31/2023)    PRAPARE - Transportation    • Lack of Transportation (Medical): No    • Lack of Transportation (Non-Medical):  No   Physical Activity: Not on file   Stress: Not on file   Social Connections: Not on file   Intimate Partner Violence: Not on file   Housing Stability: Low Risk  (8/31/2023)    Housing Stability Vital Sign    • Unable to Pay for Housing in the Last Year: No    • Number of Places Lived in the Last Year: 2    • Unstable Housing in the Last Year: No Current Outpatient Medications:   •  acetaminophen (TYLENOL) 325 mg tablet, Take 2 tablets (650 mg total) by mouth every 6 (six) hours as needed for mild pain, headaches or fever, Disp: , Rfl: 0  •  ALPRAZolam (XANAX) 1 mg tablet, Take 1 tablet (1 mg total) by mouth see administration instructions Take 1 tablet 30 minutes prior to MRI, take second tablet immediately before MRI if needed. , Disp: 2 tablet, Rfl: 0  •  amLODIPine (NORVASC) 5 mg tablet, Take 5 mg by mouth daily at bedtime, Disp: , Rfl:   •  aspirin (ECOTRIN LOW STRENGTH) 81 mg EC tablet, Take 81 mg by mouth daily, Disp: , Rfl:   •  glipiZIDE (GLUCOTROL) 5 mg tablet, Take 10 mg by mouth 2 (two) times a day, Disp: , Rfl:   •  metoprolol tartrate (LOPRESSOR) 50 mg tablet, Take 50 mg by mouth daily, Disp: , Rfl:   •  sitaGLIPtin-metFORMIN (Janumet)  MG per tablet, Take 1 tablet by mouth 2 (two) times a day with meals, Disp: , Rfl:   •  temazepam (RESTORIL) 22.5 MG capsule, Take 30 mg by mouth daily at bedtime as needed for sleep, Disp: , Rfl:   Allergies   Allergen Reactions   • Cortisone Chest Pain   • Dye [Iodinated Contrast Media] Chest Pain   • Trulicity [Dulaglutide] Bradycardia     Vitals:    10/19/23 1336   BP: 110/82   Pulse: 74   SpO2: 98%       Physical Exam  Constitutional: General appearance: The Patient is well-developed and well-nourished who appears the stated age in no acute distress. Patient is pleasant and talkative. HEENT:  Normocephalic. Sclerae are anicteric. Mucous membranes are moist. Neck is supple without adenopathy. No JVD. Abdomen: Abdomen is soft, tender on the right mid abdomen, non-distended and without masses. Extremities: There is no clubbing or cyanosis. There is no edema. Symmetric. Neuro: Grossly nonfocal. Gait is normal.     Lymphatic: No evidence of cervical adenopathy bilaterally. Skin: Warm, anicteric.     Psych:  Patient is pleasant and talkative. Breasts:        Pathology:  [unfilled]    Labs:      Imaging  MRI abdomen wo contrast    Result Date: 10/18/2023  Narrative: MRI - ABDOMEN - WITHOUT CONTRAST INDICATION: 74 years / Female  C23: Malignant neoplasm of gallbladder. COMPARISON: TECHNIQUE:  Multiplanar/multisequence MRI of the abdomen was performed without the administration of contrast. IV Contrast: FINDINGS: LOWER CHEST:   Unremarkable. LIVER: Normal in size and configuration. Multiple hepatic lesions measuring up to 2.3 cm are identified which demonstrate T2 hyperintensity and restricted diffusion suspicious for metastases. Limited evaluation of the hepatic veins and portal veins on this non-contrast MR is unremarkable. BILE DUCTS: No intrahepatic or extrahepatic bile duct dilation. GALLBLADDER: Patient is status post cholecystectomy. PANCREAS:  Unremarkable. ADRENAL GLANDS:  Unremarkable. SPLEEN:  Normal. KIDNEYS/PROXIMAL URETERS: No hydroureteronephrosis. No suspicious renal mass. BOWEL:   No dilated loops of bowel. PERITONEUM/RETROPERITONEUM: No mass. No ascites. LYMPH NODES: No abdominal lymphadenopathy. VASCULAR STRUCTURES:  No aneurysm. ABDOMINAL WALL:  Unremarkable. OSSEOUS STRUCTURES:  No suspicious osseous lesion. Impression: Interval cholecystectomy. Multiple hepatic mass lesions suspicious for metastases. Workstation performed: [id number]     I personally reviewed and interpreted the above laboratory and imaging data. Discussion/Summary: 25-year-old female with a history of a T2 gallbladder carcinoma. Her MRI is certainly concerning of liver metastasis. Despite the negative portal adenopathy, I would recommend that she undergo liver biopsy. We discussed that if this is malignant, the mainstay of treatment would be chemotherapy.   If this is benign, we would proceed with either laparoscopic liver biopsy to ensure there was no sampling error or proceed with segment 4B/5 liver resection with portal lymph node dissection. I will see her back once we have the results of the IR biopsy. I have already reached out to IR to contact her son for an expedited appointment. All of her and her family's questions were answered.

## 2023-10-20 NOTE — PRE-PROCEDURE INSTRUCTIONS
Pre-procedure Instructions for Interventional Radiology  [address]  [address] [phone]    You are scheduled for a/an liver biopsy. On Tuesday 10-24-23. Your tentative arrival time is 0730. Short stay will notify you the day before your procedure with the exact arrival time and the location to arrive. To prepare for your procedure:  Please arrange for someone to drive you home after the procedure and stay with you until the next morning if you are instructed to do so. This is typically for patients receiving some type of sedative or anesthetic for the procedure. DO NOT EAT OR DRINK ANYTHING after midnight on the evening before your procedure including candy & gum. ONLY SIPS OF WATER with your medications are allowed on the morning of your procedure. TAKE ALL OF YOUR REGULAR MEDICATIONS THE MORNING OF YOUR PROCEDURE with sips of water! We may call you to stop some of your blood sugar, blood pressure and blood thinning medications depending on the procedure. Please take all of these medications unless we instruct you to stop them. If you have an allergy to x-ray dye, please contact Interventional Radiology for an x-ray dye preparation which usually consists of an oral steroid and Benadryl. The day of your procedure:  Bring a list of the medications you take at home. Bring medications you take for breathing problems (such as inhalers), medications for chest pain, or both. Bring a case for your glasses or contacts. Bring your insurance card and a form of photo ID. Please leave all valuables such as credit cards and jewelry at home. Report to the registration desk in the main lobby at the [facility]. Ask to be directed to [facility]. While your procedure is being performed, your family may wait in the Radiology Waiting Room on the 1st floor in Radiology.   if they need to leave, they may provide a number to be called following the procedure. Be prepared to stay overnight just in case. Sometimes procedures will indicate the need for further observation or treatment. If you are scheduled for a follow-up visit with the Interventional Radiologist after your procedure, you will be called with a date and time. Special Instructions (Medications to stop taking before your procedure etc.):  Hold aspirin starting today.

## 2023-10-24 ENCOUNTER — HOSPITAL ENCOUNTER (OUTPATIENT)
Dept: RADIOLOGY | Facility: HOSPITAL | Age: 73
Discharge: HOME | End: 2023-10-24
Attending: RADIOLOGY
Payer: COMMERCIAL

## 2023-10-24 VITALS
TEMPERATURE: 97.2 F | HEIGHT: 62 IN | DIASTOLIC BLOOD PRESSURE: 68 MMHG | BODY MASS INDEX: 25.76 KG/M2 | RESPIRATION RATE: 16 BRPM | SYSTOLIC BLOOD PRESSURE: 129 MMHG | WEIGHT: 140 LBS | HEART RATE: 61 BPM | OXYGEN SATURATION: 98 %

## 2023-10-24 DIAGNOSIS — R16.0 LIVER MASS: ICD-10-CM

## 2023-10-24 LAB
ERYTHROCYTE [DISTWIDTH] IN BLOOD BY AUTOMATED COUNT: 13.9 % (ref 11.6–15.1)
HCT VFR BLD AUTO: 38.4 % (ref 34.8–46.1)
HGB BLD-MCNC: 12.8 G/DL (ref 11.5–15.4)
INR PPP: 0.93 (ref 0.84–1.19)
MCH RBC QN AUTO: 30.1 PG (ref 26.8–34.3)
MCHC RBC AUTO-ENTMCNC: 33.3 G/DL (ref 31.4–37.4)
MCV RBC AUTO: 90 FL (ref 82–98)
PLATELET # BLD AUTO: 296 THOUSANDS/UL (ref 149–390)
PMV BLD AUTO: 9.5 FL (ref 8.9–12.7)
PROTHROMBIN TIME: 12.4 SECONDS (ref 11.6–14.5)
RBC # BLD AUTO: 4.25 MILLION/UL (ref 3.81–5.12)
WBC # BLD AUTO: 8.74 THOUSAND/UL (ref 4.31–10.16)

## 2023-10-24 PROCEDURE — 47000 NEEDLE BIOPSY OF LIVER PERQ: CPT

## 2023-10-24 PROCEDURE — 77012 CT SCAN FOR NEEDLE BIOPSY: CPT

## 2023-10-24 PROCEDURE — 88341 IMHCHEM/IMCYTCHM EA ADD ANTB: CPT | Performed by: PATHOLOGY

## 2023-10-24 PROCEDURE — 99152 MOD SED SAME PHYS/QHP 5/>YRS: CPT | Performed by: RADIOLOGY

## 2023-10-24 PROCEDURE — 85027 COMPLETE CBC AUTOMATED: CPT | Performed by: RADIOLOGY

## 2023-10-24 PROCEDURE — 85610 PROTHROMBIN TIME: CPT | Performed by: RADIOLOGY

## 2023-10-24 PROCEDURE — 88313 SPECIAL STAINS GROUP 2: CPT | Performed by: PATHOLOGY

## 2023-10-24 PROCEDURE — 99152 MOD SED SAME PHYS/QHP 5/>YRS: CPT

## 2023-10-24 PROCEDURE — 88342 IMHCHEM/IMCYTCHM 1ST ANTB: CPT | Performed by: PATHOLOGY

## 2023-10-24 PROCEDURE — 77012 CT SCAN FOR NEEDLE BIOPSY: CPT | Performed by: RADIOLOGY

## 2023-10-24 PROCEDURE — 99153 MOD SED SAME PHYS/QHP EA: CPT

## 2023-10-24 PROCEDURE — 47000 NEEDLE BIOPSY OF LIVER PERQ: CPT | Performed by: RADIOLOGY

## 2023-10-24 PROCEDURE — 88307 TISSUE EXAM BY PATHOLOGIST: CPT | Performed by: PATHOLOGY

## 2023-10-24 RX ORDER — SODIUM CHLORIDE 9 MG/ML
75 INJECTION, SOLUTION INTRAVENOUS CONTINUOUS
Status: DISCONTINUED | OUTPATIENT
Start: 2023-10-24 | End: 2023-10-25 | Stop reason: HOSPADM

## 2023-10-24 RX ORDER — MIDAZOLAM HYDROCHLORIDE 2 MG/2ML
INJECTION, SOLUTION INTRAMUSCULAR; INTRAVENOUS AS NEEDED
Status: COMPLETED | OUTPATIENT
Start: 2023-10-24 | End: 2023-10-24

## 2023-10-24 RX ORDER — ACETAMINOPHEN 325 MG/1
650 TABLET ORAL ONCE
Status: COMPLETED | OUTPATIENT
Start: 2023-10-24 | End: 2023-10-24

## 2023-10-24 RX ORDER — ONDANSETRON 2 MG/ML
INJECTION INTRAMUSCULAR; INTRAVENOUS AS NEEDED
Status: COMPLETED | OUTPATIENT
Start: 2023-10-24 | End: 2023-10-24

## 2023-10-24 RX ORDER — OXYCODONE HYDROCHLORIDE 5 MG/1
5 TABLET ORAL EVERY 4 HOURS PRN
Status: DISCONTINUED | OUTPATIENT
Start: 2023-10-24 | End: 2023-10-24

## 2023-10-24 RX ORDER — FENTANYL CITRATE 50 UG/ML
INJECTION, SOLUTION INTRAMUSCULAR; INTRAVENOUS AS NEEDED
Status: COMPLETED | OUTPATIENT
Start: 2023-10-24 | End: 2023-10-24

## 2023-10-24 RX ORDER — LIDOCAINE HYDROCHLORIDE 10 MG/ML
INJECTION, SOLUTION EPIDURAL; INFILTRATION; INTRACAUDAL; PERINEURAL AS NEEDED
Status: COMPLETED | OUTPATIENT
Start: 2023-10-24 | End: 2023-10-24

## 2023-10-24 RX ORDER — METOCLOPRAMIDE HYDROCHLORIDE 5 MG/ML
10 INJECTION INTRAMUSCULAR; INTRAVENOUS ONCE
Status: COMPLETED | OUTPATIENT
Start: 2023-10-24 | End: 2023-10-24

## 2023-10-24 RX ADMIN — MIDAZOLAM 0.5 MG: 1 INJECTION INTRAMUSCULAR; INTRAVENOUS at 09:46

## 2023-10-24 RX ADMIN — LIDOCAINE HYDROCHLORIDE 10 ML: 10 INJECTION, SOLUTION EPIDURAL; INFILTRATION; INTRACAUDAL; PERINEURAL at 09:45

## 2023-10-24 RX ADMIN — FENTANYL CITRATE 50 MCG: 50 INJECTION, SOLUTION INTRAMUSCULAR; INTRAVENOUS at 09:50

## 2023-10-24 RX ADMIN — ONDANSETRON 4 MG: 2 INJECTION INTRAMUSCULAR; INTRAVENOUS at 10:29

## 2023-10-24 RX ADMIN — ACETAMINOPHEN 650 MG: 325 TABLET, FILM COATED ORAL at 14:14

## 2023-10-24 RX ADMIN — MIDAZOLAM 0.5 MG: 1 INJECTION INTRAMUSCULAR; INTRAVENOUS at 09:35

## 2023-10-24 RX ADMIN — SODIUM CHLORIDE 75 ML/HR: 0.9 INJECTION, SOLUTION INTRAVENOUS at 08:31

## 2023-10-24 RX ADMIN — MIDAZOLAM 0.5 MG: 1 INJECTION INTRAMUSCULAR; INTRAVENOUS at 09:50

## 2023-10-24 RX ADMIN — MORPHINE SULFATE 2 MG: 2 INJECTION, SOLUTION INTRAMUSCULAR; INTRAVENOUS at 11:16

## 2023-10-24 RX ADMIN — FENTANYL CITRATE 50 MCG: 50 INJECTION, SOLUTION INTRAMUSCULAR; INTRAVENOUS at 09:29

## 2023-10-24 RX ADMIN — MIDAZOLAM 1 MG: 1 INJECTION INTRAMUSCULAR; INTRAVENOUS at 09:29

## 2023-10-24 RX ADMIN — FENTANYL CITRATE 50 MCG: 50 INJECTION, SOLUTION INTRAMUSCULAR; INTRAVENOUS at 09:45

## 2023-10-24 RX ADMIN — METOCLOPRAMIDE HYDROCHLORIDE 10 MG: 5 INJECTION INTRAMUSCULAR; INTRAVENOUS at 11:14

## 2023-10-24 RX ADMIN — FENTANYL CITRATE 50 MCG: 50 INJECTION, SOLUTION INTRAMUSCULAR; INTRAVENOUS at 10:18

## 2023-10-24 NOTE — BRIEF OP NOTE (RAD/CATH)
INTERVENTIONAL RADIOLOGY PROCEDURE NOTE    Date: 10/24/2023    Procedure:   Procedure Summary       Date: 10/24/23 Room / Location: [address] [place] [address] Scan    Anesthesia Start:  Anesthesia Stop:     Procedure: IR BIOPSY LIVER MASS Diagnosis:       Liver mass      (multiple liver masses concerning for mets)    Scheduled Providers:  Responsible Provider:     Anesthesia Type: Not recorded ASA Status: Not recorded            Preoperative diagnosis:   1. Liver mass         Postoperative diagnosis: Same. Surgeon: Esther Kumar MD     Assistant: None. No qualified resident was available. Blood loss: 1 mL    Specimens: six  18 g cores    Findings: Successful image guided biopsy of right liver mass. 10 passes were made, however, the first 4 passes yielded no specimen. Follow-up imaging demonstrated the center of the lesion filled with gas, consistent with necrosis. The needle was then repositioned multiple times in an effort to biopsy the lateral, inferior, and medial walls of the lesion. Unclear if speciman is adequate despite onsite pathology. Embocube used for hemostasis. Complications: None immediate.     Anesthesia: conscious sedation

## 2023-10-24 NOTE — QUICK NOTE
Patient returns from IR at this time, 10/10 pain in her right shouler, /97, HR 74.  IR called to make aware at this time

## 2023-10-24 NOTE — INTERVAL H&P NOTE
The history and physical were reviewed, along with progress notes, and the patient was examined. There are no changes since it was written.     /79 (BP Location: Left arm)   Pulse 65   Temp (!) 97.2 °F (36.2 °C) (Temporal)   Resp 16   Ht 5' 2" (1.575 m)   Wt 63.5 kg (140 lb)   SpO2 98%   BMI 25.61 kg/m²        Carolyn Hyde MD/October 24, 2023/9:20 AM

## 2023-10-24 NOTE — DISCHARGE INSTRUCTIONS
Percutaneous Liver Biopsy   WHAT YOU NEED TO KNOW:   A PLB is a procedure to remove a sample of tissue from your liver. The sample can be sent to a lab and tested for liver disease, cancer, or infection. After the procedure you may have pain and bruising at the biopsy site. You may also have pain in your right shoulder. These symptoms should get better in 48 to 72 hours. DISCHARGE INSTRUCTIONS:     [facility] patients,  Contact Interventional Radiology at [phone] PATIENTS: Contact Interventional Radiology at [phone]   [facility] PATIENTS: Contact Interventional Radiology at [phone] if:    Fever greater than 101 or chills  You have severe pain in your abdomen. Your abdomen is larger than usual and feels hard. Your neck is more swollen and you have trouble swallowing. You feel weak or dizzy. Your heart is beating faster than usual.   Your pain does not get better after you take pain medicine. Your wound is red, swollen, or draining pus. You have nausea or are vomiting. Your skin is itchy, swollen, or you have a rash. You have questions or concerns about your condition or care. Medicines:   Acetaminophen decreases pain and fever. It is available without a doctor's order. Acetaminophen can cause liver damage if not taken correctly. Take your home medicine as directed. Resume your normal diet. Small sips of flat soda will help with mild nausea. Self-care:   Rest as directed. Do not play sports, exercise, or lift anything heavier than 5 pounds for up to 1 week. Apply firm, steady pressure if bleeding occurs. A small amount of bleeding from your wound is possible. Apply pressure with a clean gauze or towel for 5 to 10 minutes. Call 911 if bleeding becomes heavy or does not stop. Ask your healthcare provider when to take your blood thinner or antiplatelet medicine. You may need to wait 24 to 72 hours to take your medicine.  This will prevent bleeding. Follow up with your healthcare provider as directed: Write down your questions so you remember to ask them during your visits.

## 2023-10-24 NOTE — SEDATION DOCUMENTATION
Liver mass biopsy performed by Dr Adam Rodrigues. Procedure tolerated well, VSS. IR Procedure Bedrest Start Time is 1030 X 4hrs. Leslye Silverman

## 2023-10-26 PROCEDURE — 88313 SPECIAL STAINS GROUP 2: CPT | Performed by: PATHOLOGY

## 2023-10-26 PROCEDURE — 88342 IMHCHEM/IMCYTCHM 1ST ANTB: CPT | Performed by: PATHOLOGY

## 2023-10-26 PROCEDURE — 88341 IMHCHEM/IMCYTCHM EA ADD ANTB: CPT | Performed by: PATHOLOGY

## 2023-10-26 PROCEDURE — 88307 TISSUE EXAM BY PATHOLOGIST: CPT | Performed by: PATHOLOGY

## 2023-10-27 ENCOUNTER — TELEPHONE (OUTPATIENT)
Dept: SURGICAL ONCOLOGY | Facility: CLINIC | Age: 73
End: 2023-10-27

## 2023-10-27 NOTE — TELEPHONE ENCOUNTER
First attempt at getting patient to schedule for Tues with  on phone with message  Please call patient and get her in on Tuesday. I know it is a far drive for them but we are only seeing patients 1 day next week. Also, cannot be virtual because she may need to sign consent. And  forgot to give the hopeline x4,  2nd attempt to speak to patient and leave hopeline number the voice mailbox is full. Will have to try again on Monday.

## 2023-10-30 ENCOUNTER — TELEPHONE (OUTPATIENT)
Dept: SURGICAL ONCOLOGY | Facility: CLINIC | Age: 73
End: 2023-10-30

## 2023-10-31 ENCOUNTER — OFFICE VISIT (OUTPATIENT)
Dept: SURGICAL ONCOLOGY | Facility: CLINIC | Age: 73
End: 2023-10-31
Payer: COMMERCIAL

## 2023-10-31 VITALS
HEART RATE: 62 BPM | SYSTOLIC BLOOD PRESSURE: 142 MMHG | OXYGEN SATURATION: 98 % | DIASTOLIC BLOOD PRESSURE: 78 MMHG | BODY MASS INDEX: 26.52 KG/M2 | TEMPERATURE: 97.3 F | WEIGHT: 145 LBS

## 2023-10-31 DIAGNOSIS — C23 ADENOCARCINOMA OF GALLBLADDER (HCC): Primary | ICD-10-CM

## 2023-10-31 PROCEDURE — 99215 OFFICE O/P EST HI 40 MIN: CPT | Performed by: SURGERY

## 2023-10-31 RX ORDER — NALOXONE HYDROCHLORIDE 4 MG/.1ML
SPRAY NASAL
Qty: 1 EACH | Refills: 1 | Status: SHIPPED | OUTPATIENT
Start: 2023-10-31 | End: 2024-10-30

## 2023-10-31 RX ORDER — CEFAZOLIN SODIUM 1 G/50ML
1000 SOLUTION INTRAVENOUS ONCE
OUTPATIENT
Start: 2023-10-31 | End: 2023-10-31

## 2023-10-31 RX ORDER — OXYCODONE HYDROCHLORIDE 5 MG/1
5 TABLET ORAL EVERY 6 HOURS PRN
Qty: 10 TABLET | Refills: 0 | Status: SHIPPED | OUTPATIENT
Start: 2023-10-31

## 2023-10-31 NOTE — PROGRESS NOTES
Surgical Oncology Follow Up       [address] [facility]  [address]  [facility] [address] [phone]    Sophie Nash  1950  [id number]  [address] [facility]  [address]  [facility] [id number] [phone]    Diagnoses and all orders for this visit:    Adenocarcinoma of gallbladder [facility])  -     Case request operating room: ROBOTIC LIVER BIOPSY, POSSIBLE OPEN; Standing  -     Comprehensive metabolic panel; Future  -     CBC and differential; Future  -     APTT; Future  -     Protime-INR; Future  -     oxyCODONE (Roxicodone) 5 immediate release tablet; Take 1 tablet (5 mg total) by mouth every 6 (six) hours as needed for moderate pain Max Daily Amount: 20 mg  -     naloxone (NARCAN) 4 mg/0.1 mL nasal spray; Administer 1 spray into a nostril. If no response after 2-3 minutes, give another dose in the other nostril using a new spray. -     Case request operating room: ROBOTIC LIVER BIOPSY, POSSIBLE OPEN    Other orders  -     Incentive spirometry; Standing  -     Insert and maintain IV line; Standing  -     Void On-Call to O.R.; Standing  -     Place sequential compression device; Standing  -     ceFAZolin (ANCEF) IVPB (premix in dextrose) 1,000 mg 50 mL        Chief Complaint   Patient presents with    Follow-up       No follow-ups on file. Oncology History   Adenocarcinoma of gallbladder ([address])   9/2/2023 Surgery    Laparoscopic cholecystectomy:  Gallbladder, Gallbladder and contents , cholecystectomy:  -Moderately differentiated adenocarcinoma arising in a background of erosive chronic cholecystitis with high grade surface dysplasia and dense serosal adhesions.  -Tumor is present in perimuscular fibrous tissue (AJCC 8th edition pT2.) The surgical margins are uninvolved. Perineural invasion identified. No vascular invasion identified. -Cholelithiasis. 9/28/2023 Initial Diagnosis    Adenocarcinoma of gallbladder ([address])     10/13/2023 -  Cancer Staged    Staging form: Gallbladder, AJCC 8th Edition  - Clinical: cT2, cN0, cM0 - Signed by Fatemeh Peralta MD on 10/13/2023  Total positive nodes: 0       10/19/2023 -  Cancer Staged    Staging form: Gallbladder, AJCC 8th Edition  - Pathologic stage from 10/19/2023: Stage IVB (pT2, pN0, cM1) - Signed by Fatemeh Peralta MD on 10/19/2023  Stage prefix: Initial diagnosis  Total positive nodes: 0               History of Present Illness: Patient returns in follow-up. Her IR guided liver biopsy was benign. This was not concordant with the pathology. Patient continues to feel well. Review of Systems  Complete ROS Surg Onc:   Complete ROS Surg Onc:   Constitutional: The patient denies new or recent history of general fatigue, no recent weight loss, no change in appetite. Eyes: No complaints of visual problems, no scleral icterus. ENT: no complaints of ear pain, no hoarseness, no difficulty swallowing,  no tinnitus and no new masses in head, oral cavity, or neck. Cardiovascular: No complaints of chest pain, no palpitations, no ankle edema. Respiratory: No complaints of shortness of breath, no cough. Gastrointestinal: No complaints of jaundice, no bloody stools, no pale stools. Genitourinary: No complaints of dysuria, no hematuria, no nocturia, no frequent urination, no urethral discharge. Musculoskeletal: No complaints of weakness, paralysis, joint stiffness or arthralgias. Integumentary: No complaints of rash, no new lesions. Neurological: No complaints of convulsions, no seizures, no dizziness. Hematologic/Lymphatic: No complaints of easy bruising. Endocrine:  No hot or cold intolerance. No polydipsia, polyphagia, or polyuria. Allergy/immunology:  No environmental allergies. No food allergies. Not immunocompromised. Skin:  No pallor or rash. No wound.         Patient Active Problem List   Diagnosis    Acute cholecystitis    Near syncope    Diabetes (HCC)    Hypertension    Elevated TSH    Hyperglycemia    Lightheadedness    Elevated d-dimer    Sepsis ([address])    Acute blood loss anemia    Adenocarcinoma of gallbladder ([address])     Past Medical History:   Diagnosis Date    Diabetes mellitus ([address])     Hypertension      Past Surgical History:   Procedure Laterality Date    CHOLECYSTECTOMY LAPAROSCOPIC N/A 9/2/2023    Procedure: CHOLECYSTECTOMY LAPAROSCOPIC;  Surgeon: Aruelio Salmeron DO;  Location: [facility];  Service: General    IR BIOPSY LIVER MASS  10/24/2023     No family history on file. Social History     Socioeconomic History    Marital status: /Civil Union     Spouse name: Not on file    Number of children: Not on file    Years of education: Not on file    Highest education level: Not on file   Occupational History    Not on file   Tobacco Use    Smoking status: Never     Passive exposure: Never    Smokeless tobacco: Never   Vaping Use    Vaping Use: Never used   Substance and Sexual Activity    Alcohol use: Never    Drug use: Not on file    Sexual activity: Not on file   Other Topics Concern    Not on file   Social History Narrative    Not on file     Social Determinants of Health     Financial Resource Strain: Not on file   Food Insecurity: No Food Insecurity (8/31/2023)    Hunger Vital Sign     Worried About Running Out of Food in the Last Year: Never true     Ran Out of Food in the Last Year: Never true   Transportation Needs: No Transportation Needs (8/31/2023)    PRAPARE - Transportation     Lack of Transportation (Medical): No     Lack of Transportation (Non-Medical):  No   Physical Activity: Not on file   Stress: Not on file   Social Connections: Not on file   Intimate Partner Violence: Not on file   Housing Stability: Low Risk  (8/31/2023)    Housing Stability Vital Sign     Unable to Pay for Housing in the Last Year: No     Number of [address] in the Last Year: 2     Unstable Housing in the Last Year: No       Current Outpatient Medications:     acetaminophen (TYLENOL) 325 mg tablet, Take 2 tablets (650 mg total) by mouth every 6 (six) hours as needed for mild pain, headaches or fever, Disp: , Rfl: 0    ALPRAZolam (XANAX) 1 mg tablet, Take 1 tablet (1 mg total) by mouth see administration instructions Take 1 tablet 30 minutes prior to MRI, take second tablet immediately before MRI if needed. , Disp: 2 tablet, Rfl: 0    amLODIPine (NORVASC) 5 mg tablet, Take 5 mg by mouth daily at bedtime, Disp: , Rfl:     aspirin (ECOTRIN LOW STRENGTH) 81 mg EC tablet, Take 81 mg by mouth daily, Disp: , Rfl:     glipiZIDE (GLUCOTROL) 5 mg tablet, Take 10 mg by mouth 2 (two) times a day, Disp: , Rfl:     metoprolol tartrate (LOPRESSOR) 50 mg tablet, Take 50 mg by mouth daily, Disp: , Rfl:     naloxone (NARCAN) 4 mg/0.1 mL nasal spray, Administer 1 spray into a nostril. If no response after 2-3 minutes, give another dose in the other nostril using a new spray., Disp: 1 each, Rfl: 1    oxyCODONE (Roxicodone) 5 immediate release tablet, Take 1 tablet (5 mg total) by mouth every 6 (six) hours as needed for moderate pain Max Daily Amount: 20 mg, Disp: 10 tablet, Rfl: 0    sitaGLIPtin-metFORMIN (Janumet)  MG per tablet, Take 1 tablet by mouth 2 (two) times a day with meals, Disp: , Rfl:     temazepam (RESTORIL) 22.5 MG capsule, Take 30 mg by mouth daily at bedtime as needed for sleep, Disp: , Rfl:   Allergies   Allergen Reactions    Cortisone Chest Pain    Dye [Iodinated Contrast Media] Chest Pain    Trulicity [Dulaglutide] Bradycardia     Vitals:    10/31/23 1119   BP: 142/78   Pulse: 62   Temp: (!) 97.3 °F (36.3 °C)   SpO2: 98%       Physical Exam  Constitutional: General appearance: The Patient is well-developed and well-nourished who appears the stated age in no acute distress. Patient is pleasant and talkative. HEENT:  Normocephalic. Sclerae are anicteric.  Mucous membranes are moist. Neck is supple without adenopathy. No JVD. Chest: The lungs are clear to auscultation. Cardiac: Heart is regular rate. Abdomen: Abdomen is soft, non-tender, non-distended and without masses. Extremities: There is no clubbing or cyanosis. There is no edema. Symmetric. Neuro: Grossly nonfocal. Gait is normal.     Lymphatic: No evidence of cervical adenopathy bilaterally. Skin: Warm, anicteric. Psych:  Patient is pleasant and talkative. Breasts:        Pathology:  Final Diagnosis   A. Liver, mass, needle biopsy:  - Steatohepatitis. - Bridging fibrosis with focal nodular formation (stage 3-4 of 4). - Negative for malignancy. Note: Patient medical history of gallbladder adenocarcinoma with multiple liver masses is noted. In this biopsy, the portal triads are evenly distributed across all cores as highlighted by CK19. The current findings can't explain a liver mass lesion. Re-biopsy is suggested if clinical indicated. Electronically signed by Matty Rucker MD on 10/26/2023 at 11:14 AM       Labs:      Imaging  IR biopsy liver mass    Result Date: 10/24/2023  Narrative: CT-scan guided needle biopsy of a right liver mass History: Gallbladder carcinoma Radiation dose: 1005.75 mGy Conscious sedation time: 60 minutes Technique: This examination, like all CT scans performed in the [facility], was performed utilizing techniques to minimize radiation dose exposure, including the use of iterative reconstruction and automated exposure control. The patient was brought to the CT scanner and placed supine on the table. After axial images were obtained through the region of interest an area of the skin was then marked, prepped, and draped in usual sterile fashion. Lidocaine was administered to the  skin and a small skin incision was made. A 17-gauge cannula was advanced up to the lesion, and through this, an 18-gauge core needle was advanced.  4 passes were made which did not yield the specimen. Repeat imaging demonstrated the center of the lesion to be filled with gas, consistent with necrosis. The needle was repositioned, and 6 additional passes were made from the medial wall, inferior wall, and lateral wall of the lesion. It is unclear whether the specimen is adequate at this point. The specimen was sent to the lab in formalin. At the end of the procedure, Embo cube was used for hemostasis through the cannula as it was removed. The patient tolerated the procedure well and suffered no complications. Impression: Impression: Successful percutaneous core biopsy of a right liver lesion as described. A full pathology report will follow. Workstation performed: [id number]     MRI abdomen wo contrast    Result Date: 10/18/2023  Narrative: MRI - ABDOMEN - WITHOUT CONTRAST INDICATION: 74 years / Female  C23: Malignant neoplasm of gallbladder. COMPARISON: TECHNIQUE:  Multiplanar/multisequence MRI of the abdomen was performed without the administration of contrast. IV Contrast: FINDINGS: LOWER CHEST:   Unremarkable. LIVER: Normal in size and configuration. Multiple hepatic lesions measuring up to 2.3 cm are identified which demonstrate T2 hyperintensity and restricted diffusion suspicious for metastases. Limited evaluation of the hepatic veins and portal veins on this non-contrast MR is unremarkable. BILE DUCTS: No intrahepatic or extrahepatic bile duct dilation. GALLBLADDER: Patient is status post cholecystectomy. PANCREAS:  Unremarkable. ADRENAL GLANDS:  Unremarkable. SPLEEN:  Normal. KIDNEYS/PROXIMAL URETERS: No hydroureteronephrosis. No suspicious renal mass. BOWEL:   No dilated loops of bowel. PERITONEUM/RETROPERITONEUM: No mass. No ascites. LYMPH NODES: No abdominal lymphadenopathy. VASCULAR STRUCTURES:  No aneurysm. ABDOMINAL WALL:  Unremarkable. OSSEOUS STRUCTURES:  No suspicious osseous lesion. Impression: Interval cholecystectomy.  Multiple hepatic mass lesions suspicious for metastases. Workstation performed: [id number]     I personally reviewed and interpreted the above laboratory and imaging data. Discussion/Summary: 66-year-old female with a clinical T2N0M1 gallbladder carcinoma. Her MRI is concerning for liver metastasis, although the patient states she was found to have liver lesions several years ago. None of these were biopsied in the past.  It is unclear if these are even the same lesions. Nevertheless, her pathology is not concordant with the imaging and the pathology does not explain a mass lesion. I have discussed with interventional radiology, and the biopsy was difficult and they do not think a repeat biopsy would be beneficial by IR. Consequently, I have recommended that she undergo robotic liver biopsy, possible open liver biopsy. The risks were explained including bleeding, infection, need for further surgery, wound complications, bile leak and adjacent organ injury. Informed consent was obtained. We will schedule this at our earliest mutual convenience. We discussed that if this is indeed malignant, and chemotherapy would be the mainstay of treatment. If these lesions are benign I would then proceed with segment 4B/5 liver resection with portal node dissection. This was discussed in detail with the patient and her family. All of their questions were answered. This visit took 50 minutes of face-to-face time with the patient and greater than 50% time spent counseling and coordinating care.

## 2023-10-31 NOTE — LETTER
October 31, 2023     Shannan Jung DO  [facility]  [address]  [place]    Patient: Sharifa Farrell   YOB: 1950   Date of Visit: 10/31/2023       Dear Dr. Carmen Goetz: Thank you for referring Sharifa Farrell to me for evaluation. Below are my notes for this consultation. If you have questions, please do not hesitate to call me. I look forward to following your patient along with you. Sincerely,        Shari Galicia MD        CC: MD Shari Guadarrama MD  10/31/2023  1:07 PM  Sign when Signing Visit               Surgical Oncology Follow Up       [facility] [address] [address]  [address] [place] [address]  1950  [id number]  [address] [facility] Heather Kamara  [facility] [address] [facility] [address] [phone]    Diagnoses and all orders for this visit:    Adenocarcinoma of gallbladder [facility])  -     Case request operating room: ROBOTIC LIVER BIOPSY, POSSIBLE OPEN; Standing  -     Comprehensive metabolic panel; Future  -     CBC and differential; Future  -     APTT; Future  -     Protime-INR; Future  -     oxyCODONE (Roxicodone) 5 immediate release tablet; Take 1 tablet (5 mg total) by mouth every 6 (six) hours as needed for moderate pain Max Daily Amount: 20 mg  -     naloxone (NARCAN) 4 mg/0.1 mL nasal spray; Administer 1 spray into a nostril. If no response after 2-3 minutes, give another dose in the other nostril using a new spray.   -     Case request operating room: ROBOTIC LIVER BIOPSY, POSSIBLE OPEN    Other orders  -     Incentive spirometry; Standing  -     Insert and maintain IV line; Standing  -     Void On-Call to O.R.; Standing  -     Place sequential compression device; Standing  -     ceFAZolin (ANCEF) IVPB (premix in dextrose) 1,000 mg 50 mL        Chief Complaint   Patient presents with   • Follow-up       No follow-ups on file. Oncology History   Adenocarcinoma of gallbladder ([address])   9/2/2023 Surgery    Laparoscopic cholecystectomy:  Gallbladder, Gallbladder and contents , cholecystectomy:  -Moderately differentiated adenocarcinoma arising in a background of erosive chronic cholecystitis with high grade surface dysplasia and dense serosal adhesions.  -Tumor is present in perimuscular fibrous tissue (AJCC 8th edition pT2.) The surgical margins are uninvolved. Perineural invasion identified. No vascular invasion identified.   -Cholelithiasis. 9/28/2023 Initial Diagnosis    Adenocarcinoma of gallbladder ([address])     10/13/2023 -  Cancer Staged    Staging form: Gallbladder, AJCC 8th Edition  - Clinical: cT2, cN0, cM0 - Signed by Henrique Chaudhary MD on 10/13/2023  Total positive nodes: 0       10/19/2023 -  Cancer Staged    Staging form: Gallbladder, AJCC 8th Edition  - Pathologic stage from 10/19/2023: Stage IVB (pT2, pN0, cM1) - Signed by Henrique Chaudhary MD on 10/19/2023  Stage prefix: Initial diagnosis  Total positive nodes: 0               History of Present Illness: Patient returns in follow-up. Her IR guided liver biopsy was benign. This was not concordant with the pathology. Patient continues to feel well. Review of Systems  Complete ROS Surg Onc:   Complete ROS Surg Onc:   Constitutional: The patient denies new or recent history of general fatigue, no recent weight loss, no change in appetite. Eyes: No complaints of visual problems, no scleral icterus. ENT: no complaints of ear pain, no hoarseness, no difficulty swallowing,  no tinnitus and no new masses in head, oral cavity, or neck. Cardiovascular: No complaints of chest pain, no palpitations, no ankle edema. Respiratory: No complaints of shortness of breath, no cough. Gastrointestinal: No complaints of jaundice, no bloody stools, no pale stools.    Genitourinary: No complaints of dysuria, no hematuria, no nocturia, no frequent urination, no urethral discharge. Musculoskeletal: No complaints of weakness, paralysis, joint stiffness or arthralgias. Integumentary: No complaints of rash, no new lesions. Neurological: No complaints of convulsions, no seizures, no dizziness. Hematologic/Lymphatic: No complaints of easy bruising. Endocrine:  No hot or cold intolerance. No polydipsia, polyphagia, or polyuria. Allergy/immunology:  No environmental allergies. No food allergies. Not immunocompromised. Skin:  No pallor or rash. No wound. Patient Active Problem List   Diagnosis   • Acute cholecystitis   • Near syncope   • Diabetes ([address])   • Hypertension   • Elevated TSH   • Hyperglycemia   • Lightheadedness   • Elevated d-dimer   • Sepsis ([address])   • Acute blood loss anemia   • Adenocarcinoma of gallbladder (HCC)     Past Medical History:   Diagnosis Date   • Diabetes mellitus ([address])    • Hypertension      Past Surgical History:   Procedure Laterality Date   • CHOLECYSTECTOMY LAPAROSCOPIC N/A 9/2/2023    Procedure: CHOLECYSTECTOMY LAPAROSCOPIC;  Surgeon: Cristina Mauro DO;  Location: [facility];  Service: General   • IR BIOPSY LIVER MASS  10/24/2023     No family history on file.   Social History     Socioeconomic History   • Marital status: /Civil Union     Spouse name: Not on file   • Number of children: Not on file   • Years of education: Not on file   • Highest education level: Not on file   Occupational History   • Not on file   Tobacco Use   • Smoking status: Never     Passive exposure: Never   • Smokeless tobacco: Never   Vaping Use   • Vaping Use: Never used   Substance and Sexual Activity   • Alcohol use: Never   • Drug use: Not on file   • Sexual activity: Not on file   Other Topics Concern   • Not on file   Social History Narrative   • Not on file     Social Determinants of Health     Financial Resource Strain: Not on file   Food Insecurity: No Food Insecurity (8/31/2023)    Hunger Vital Sign    • Worried About Running Out of Food in the Last Year: Never true    • Ran Out of Food in the Last Year: Never true   Transportation Needs: No Transportation Needs (8/31/2023)    PRAPARE - Transportation    • Lack of Transportation (Medical): No    • Lack of Transportation (Non-Medical): No   Physical Activity: Not on file   Stress: Not on file   Social Connections: Not on file   Intimate Partner Violence: Not on file   Housing Stability: Low Risk  (8/31/2023)    Housing Stability Vital Sign    • Unable to Pay for Housing in the Last Year: No    • Number of Places Lived in the Last Year: 2    • Unstable Housing in the Last Year: No       Current Outpatient Medications:   •  acetaminophen (TYLENOL) 325 mg tablet, Take 2 tablets (650 mg total) by mouth every 6 (six) hours as needed for mild pain, headaches or fever, Disp: , Rfl: 0  •  ALPRAZolam (XANAX) 1 mg tablet, Take 1 tablet (1 mg total) by mouth see administration instructions Take 1 tablet 30 minutes prior to MRI, take second tablet immediately before MRI if needed. , Disp: 2 tablet, Rfl: 0  •  amLODIPine (NORVASC) 5 mg tablet, Take 5 mg by mouth daily at bedtime, Disp: , Rfl:   •  aspirin (ECOTRIN LOW STRENGTH) 81 mg EC tablet, Take 81 mg by mouth daily, Disp: , Rfl:   •  glipiZIDE (GLUCOTROL) 5 mg tablet, Take 10 mg by mouth 2 (two) times a day, Disp: , Rfl:   •  metoprolol tartrate (LOPRESSOR) 50 mg tablet, Take 50 mg by mouth daily, Disp: , Rfl:   •  naloxone (NARCAN) 4 mg/0.1 mL nasal spray, Administer 1 spray into a nostril.  If no response after 2-3 minutes, give another dose in the other nostril using a new spray., Disp: 1 each, Rfl: 1  •  oxyCODONE (Roxicodone) 5 immediate release tablet, Take 1 tablet (5 mg total) by mouth every 6 (six) hours as needed for moderate pain Max Daily Amount: 20 mg, Disp: 10 tablet, Rfl: 0  •  sitaGLIPtin-metFORMIN (Janumet)  MG per tablet, Take 1 tablet by mouth 2 (two) times a day with meals, Disp: , Rfl:   • temazepam (RESTORIL) 22.5 MG capsule, Take 30 mg by mouth daily at bedtime as needed for sleep, Disp: , Rfl:   Allergies   Allergen Reactions   • Cortisone Chest Pain   • Dye [Iodinated Contrast Media] Chest Pain   • Trulicity [Dulaglutide] Bradycardia     Vitals:    10/31/23 1119   BP: 142/78   Pulse: 62   Temp: (!) 97.3 °F (36.3 °C)   SpO2: 98%       Physical Exam  Constitutional: General appearance: The Patient is well-developed and well-nourished who appears the stated age in no acute distress. Patient is pleasant and talkative. HEENT:  Normocephalic. Sclerae are anicteric. Mucous membranes are moist. Neck is supple without adenopathy. No JVD. Chest: The lungs are clear to auscultation. Cardiac: Heart is regular rate. Abdomen: Abdomen is soft, non-tender, non-distended and without masses. Extremities: There is no clubbing or cyanosis. There is no edema. Symmetric. Neuro: Grossly nonfocal. Gait is normal.     Lymphatic: No evidence of cervical adenopathy bilaterally. Skin: Warm, anicteric. Psych:  Patient is pleasant and talkative. Breasts:        Pathology:  Final Diagnosis   A. Liver, mass, needle biopsy:  - Steatohepatitis. - Bridging fibrosis with focal nodular formation (stage 3-4 of 4). - Negative for malignancy. Note: Patient medical history of gallbladder adenocarcinoma with multiple liver masses is noted. In this biopsy, the portal triads are evenly distributed across all cores as highlighted by CK19. The current findings can't explain a liver mass lesion. Re-biopsy is suggested if clinical indicated. Electronically signed by Sergio Mandujano MD on 10/26/2023 at 11:14 AM       Labs:      Imaging  IR biopsy liver mass    Result Date: 10/24/2023  Narrative: CT-scan guided needle biopsy of a right liver mass History: Gallbladder carcinoma Radiation dose: 1005.75 mGy Conscious sedation time: 60 minutes Technique:  This examination, like all CT scans performed in the [facility], was performed utilizing techniques to minimize radiation dose exposure, including the use of iterative reconstruction and automated exposure control. The patient was brought to the CT scanner and placed supine on the table. After axial images were obtained through the region of interest an area of the skin was then marked, prepped, and draped in usual sterile fashion. Lidocaine was administered to the  skin and a small skin incision was made. A 17-gauge cannula was advanced up to the lesion, and through this, an 18-gauge core needle was advanced. 4 passes were made which did not yield the specimen. Repeat imaging demonstrated the center of the lesion to be filled with gas, consistent with necrosis. The needle was repositioned, and 6 additional passes were made from the medial wall, inferior wall, and lateral wall of the lesion. It is unclear whether the specimen is adequate at this point. The specimen was sent to the lab in formalin. At the end of the procedure, Embo cube was used for hemostasis through the cannula as it was removed. The patient tolerated the procedure well and suffered no complications. Impression: Impression: Successful percutaneous core biopsy of a right liver lesion as described. A full pathology report will follow. Workstation performed: [id number]     MRI abdomen wo contrast    Result Date: 10/18/2023  Narrative: MRI - ABDOMEN - WITHOUT CONTRAST INDICATION: 74 years / Female  C23: Malignant neoplasm of gallbladder. COMPARISON: TECHNIQUE:  Multiplanar/multisequence MRI of the abdomen was performed without the administration of contrast. IV Contrast: FINDINGS: LOWER CHEST:   Unremarkable. LIVER: Normal in size and configuration. Multiple hepatic lesions measuring up to 2.3 cm are identified which demonstrate T2 hyperintensity and restricted diffusion suspicious for metastases. Limited evaluation of the hepatic veins and portal veins on this non-contrast MR is unremarkable. BILE DUCTS: No intrahepatic or extrahepatic bile duct dilation. GALLBLADDER: Patient is status post cholecystectomy. PANCREAS:  Unremarkable. ADRENAL GLANDS:  Unremarkable. SPLEEN:  Normal. KIDNEYS/PROXIMAL URETERS: No hydroureteronephrosis. No suspicious renal mass. BOWEL:   No dilated loops of bowel. PERITONEUM/RETROPERITONEUM: No mass. No ascites. LYMPH NODES: No abdominal lymphadenopathy. VASCULAR STRUCTURES:  No aneurysm. ABDOMINAL WALL:  Unremarkable. OSSEOUS STRUCTURES:  No suspicious osseous lesion. Impression: Interval cholecystectomy. Multiple hepatic mass lesions suspicious for metastases. Workstation performed: [id number]     I personally reviewed and interpreted the above laboratory and imaging data. Discussion/Summary: 42-year-old female with a clinical T2N0M1 gallbladder carcinoma. Her MRI is concerning for liver metastasis, although the patient states she was found to have liver lesions several years ago. None of these were biopsied in the past.  It is unclear if these are even the same lesions. Nevertheless, her pathology is not concordant with the imaging and the pathology does not explain a mass lesion. I have discussed with interventional radiology, and the biopsy was difficult and they do not think a repeat biopsy would be beneficial by IR. Consequently, I have recommended that she undergo robotic liver biopsy, possible open liver biopsy. The risks were explained including bleeding, infection, need for further surgery, wound complications, bile leak and adjacent organ injury. Informed consent was obtained. We will schedule this at our earliest mutual convenience. We discussed that if this is indeed malignant, and chemotherapy would be the mainstay of treatment. If these lesions are benign I would then proceed with segment 4B/5 liver resection with portal node dissection. This was discussed in detail with the patient and her family. All of their questions were answered.   This visit took 50 minutes of face-to-face time with the patient and greater than 50% time spent counseling and coordinating care.

## 2023-10-31 NOTE — H&P (VIEW-ONLY)
Surgical Oncology Follow Up       [address] [facility] [place]  [address]  [place] [address] [phone]    Kristine Nash  1950  [id number]  [address] [facility]  [address]  [place] [address] [phone]    Diagnoses and all orders for this visit:    Adenocarcinoma of gallbladder [facility])  -     Case request operating room: ROBOTIC LIVER BIOPSY, POSSIBLE OPEN; Standing  -     Comprehensive metabolic panel; Future  -     CBC and differential; Future  -     APTT; Future  -     Protime-INR; Future  -     oxyCODONE (Roxicodone) 5 immediate release tablet; Take 1 tablet (5 mg total) by mouth every 6 (six) hours as needed for moderate pain Max Daily Amount: 20 mg  -     naloxone (NARCAN) 4 mg/0.1 mL nasal spray; Administer 1 spray into a nostril. If no response after 2-3 minutes, give another dose in the other nostril using a new spray. -     Case request operating room: ROBOTIC LIVER BIOPSY, POSSIBLE OPEN    Other orders  -     Incentive spirometry; Standing  -     Insert and maintain IV line; Standing  -     Void On-Call to O.R.; Standing  -     Place sequential compression device; Standing  -     ceFAZolin (ANCEF) IVPB (premix in dextrose) 1,000 mg 50 mL        Chief Complaint   Patient presents with    Follow-up       No follow-ups on file. Oncology History   Adenocarcinoma of gallbladder ([address])   9/2/2023 Surgery    Laparoscopic cholecystectomy:  Gallbladder, Gallbladder and contents , cholecystectomy:  -Moderately differentiated adenocarcinoma arising in a background of erosive chronic cholecystitis with high grade surface dysplasia and dense serosal adhesions.  -Tumor is present in perimuscular fibrous tissue (AJCC 8th edition pT2.) The surgical margins are uninvolved. Perineural invasion identified. No vascular invasion identified. -Cholelithiasis. 9/28/2023 Initial Diagnosis    Adenocarcinoma of gallbladder ([address])     10/13/2023 -  Cancer Staged    Staging form: Gallbladder, AJCC 8th Edition  - Clinical: cT2, cN0, cM0 - Signed by Makeda Madrigal MD on 10/13/2023  Total positive nodes: 0       10/19/2023 -  Cancer Staged    Staging form: Gallbladder, AJCC 8th Edition  - Pathologic stage from 10/19/2023: Stage IVB (pT2, pN0, cM1) - Signed by Makeda Madrigal MD on 10/19/2023  Stage prefix: Initial diagnosis  Total positive nodes: 0               History of Present Illness: Patient returns in follow-up. Her IR guided liver biopsy was benign. This was not concordant with the pathology. Patient continues to feel well. Review of Systems  Complete ROS Surg Onc:   Complete ROS Surg Onc:   Constitutional: The patient denies new or recent history of general fatigue, no recent weight loss, no change in appetite. Eyes: No complaints of visual problems, no scleral icterus. ENT: no complaints of ear pain, no hoarseness, no difficulty swallowing,  no tinnitus and no new masses in head, oral cavity, or neck. Cardiovascular: No complaints of chest pain, no palpitations, no ankle edema. Respiratory: No complaints of shortness of breath, no cough. Gastrointestinal: No complaints of jaundice, no bloody stools, no pale stools. Genitourinary: No complaints of dysuria, no hematuria, no nocturia, no frequent urination, no urethral discharge. Musculoskeletal: No complaints of weakness, paralysis, joint stiffness or arthralgias. Integumentary: No complaints of rash, no new lesions. Neurological: No complaints of convulsions, no seizures, no dizziness. Hematologic/Lymphatic: No complaints of easy bruising. Endocrine:  No hot or cold intolerance. No polydipsia, polyphagia, or polyuria. Allergy/immunology:  No environmental allergies. No food allergies. Not immunocompromised. Skin:  No pallor or rash. No wound.         Patient Active Problem List   Diagnosis    Acute cholecystitis    Near syncope    Diabetes (HCC)    Hypertension    Elevated TSH    Hyperglycemia    Lightheadedness    Elevated d-dimer    Sepsis ([address])    Acute blood loss anemia    Adenocarcinoma of gallbladder ([address])     Past Medical History:   Diagnosis Date    Diabetes mellitus ([address])     Hypertension      Past Surgical History:   Procedure Laterality Date    CHOLECYSTECTOMY LAPAROSCOPIC N/A 9/2/2023    Procedure: CHOLECYSTECTOMY LAPAROSCOPIC;  Surgeon: Monica Kilpatrick DO;  Location: [facility];  Service: General    IR BIOPSY LIVER MASS  10/24/2023     No family history on file. Social History     Socioeconomic History    Marital status: /Civil Union     Spouse name: Not on file    Number of children: Not on file    Years of education: Not on file    Highest education level: Not on file   Occupational History    Not on file   Tobacco Use    Smoking status: Never     Passive exposure: Never    Smokeless tobacco: Never   Vaping Use    Vaping Use: Never used   Substance and Sexual Activity    Alcohol use: Never    Drug use: Not on file    Sexual activity: Not on file   Other Topics Concern    Not on file   Social History Narrative    Not on file     Social Determinants of Health     Financial Resource Strain: Not on file   Food Insecurity: No Food Insecurity (8/31/2023)    Hunger Vital Sign     Worried About Running Out of Food in the Last Year: Never true     Ran Out of Food in the Last Year: Never true   Transportation Needs: No Transportation Needs (8/31/2023)    PRAPARE - Transportation     Lack of Transportation (Medical): No     Lack of Transportation (Non-Medical):  No   Physical Activity: Not on file   Stress: Not on file   Social Connections: Not on file   Intimate Partner Violence: Not on file   Housing Stability: Low Risk  (8/31/2023)    Housing Stability Vital Sign     Unable to Pay for Housing in the Last Year: No     Number of [address] in the Last Year: 2     Unstable Housing in the Last Year: No       Current Outpatient Medications:     acetaminophen (TYLENOL) 325 mg tablet, Take 2 tablets (650 mg total) by mouth every 6 (six) hours as needed for mild pain, headaches or fever, Disp: , Rfl: 0    ALPRAZolam (XANAX) 1 mg tablet, Take 1 tablet (1 mg total) by mouth see administration instructions Take 1 tablet 30 minutes prior to MRI, take second tablet immediately before MRI if needed. , Disp: 2 tablet, Rfl: 0    amLODIPine (NORVASC) 5 mg tablet, Take 5 mg by mouth daily at bedtime, Disp: , Rfl:     aspirin (ECOTRIN LOW STRENGTH) 81 mg EC tablet, Take 81 mg by mouth daily, Disp: , Rfl:     glipiZIDE (GLUCOTROL) 5 mg tablet, Take 10 mg by mouth 2 (two) times a day, Disp: , Rfl:     metoprolol tartrate (LOPRESSOR) 50 mg tablet, Take 50 mg by mouth daily, Disp: , Rfl:     naloxone (NARCAN) 4 mg/0.1 mL nasal spray, Administer 1 spray into a nostril. If no response after 2-3 minutes, give another dose in the other nostril using a new spray., Disp: 1 each, Rfl: 1    oxyCODONE (Roxicodone) 5 immediate release tablet, Take 1 tablet (5 mg total) by mouth every 6 (six) hours as needed for moderate pain Max Daily Amount: 20 mg, Disp: 10 tablet, Rfl: 0    sitaGLIPtin-metFORMIN (Janumet)  MG per tablet, Take 1 tablet by mouth 2 (two) times a day with meals, Disp: , Rfl:     temazepam (RESTORIL) 22.5 MG capsule, Take 30 mg by mouth daily at bedtime as needed for sleep, Disp: , Rfl:   Allergies   Allergen Reactions    Cortisone Chest Pain    Dye [Iodinated Contrast Media] Chest Pain    Trulicity [Dulaglutide] Bradycardia     Vitals:    10/31/23 1119   BP: 142/78   Pulse: 62   Temp: (!) 97.3 °F (36.3 °C)   SpO2: 98%       Physical Exam  Constitutional: General appearance: The Patient is well-developed and well-nourished who appears the stated age in no acute distress. Patient is pleasant and talkative. HEENT:  Normocephalic. Sclerae are anicteric.  Mucous membranes are moist. Neck is supple without adenopathy. No JVD. Chest: The lungs are clear to auscultation. Cardiac: Heart is regular rate. Abdomen: Abdomen is soft, non-tender, non-distended and without masses. Extremities: There is no clubbing or cyanosis. There is no edema. Symmetric. Neuro: Grossly nonfocal. Gait is normal.     Lymphatic: No evidence of cervical adenopathy bilaterally. Skin: Warm, anicteric. Psych:  Patient is pleasant and talkative. Breasts:        Pathology:  Final Diagnosis   A. Liver, mass, needle biopsy:  - Steatohepatitis. - Bridging fibrosis with focal nodular formation (stage 3-4 of 4). - Negative for malignancy. Note: Patient medical history of gallbladder adenocarcinoma with multiple liver masses is noted. In this biopsy, the portal triads are evenly distributed across all cores as highlighted by CK19. The current findings can't explain a liver mass lesion. Re-biopsy is suggested if clinical indicated. Electronically signed by Leslie Fritz MD on 10/26/2023 at 11:14 AM       Labs:      Imaging  IR biopsy liver mass    Result Date: 10/24/2023  Narrative: CT-scan guided needle biopsy of a right liver mass History: Gallbladder carcinoma Radiation dose: 1005.75 mGy Conscious sedation time: 60 minutes Technique: This examination, like all CT scans performed in the [facility], was performed utilizing techniques to minimize radiation dose exposure, including the use of iterative reconstruction and automated exposure control. The patient was brought to the CT scanner and placed supine on the table. After axial images were obtained through the region of interest an area of the skin was then marked, prepped, and draped in usual sterile fashion. Lidocaine was administered to the  skin and a small skin incision was made. A 17-gauge cannula was advanced up to the lesion, and through this, an 18-gauge core needle was advanced.  4 passes were made which did not yield the specimen. Repeat imaging demonstrated the center of the lesion to be filled with gas, consistent with necrosis. The needle was repositioned, and 6 additional passes were made from the medial wall, inferior wall, and lateral wall of the lesion. It is unclear whether the specimen is adequate at this point. The specimen was sent to the lab in formalin. At the end of the procedure, Embo cube was used for hemostasis through the cannula as it was removed. The patient tolerated the procedure well and suffered no complications. Impression: Impression: Successful percutaneous core biopsy of a right liver lesion as described. A full pathology report will follow. Workstation performed: [id number]     MRI abdomen wo contrast    Result Date: 10/18/2023  Narrative: MRI - ABDOMEN - WITHOUT CONTRAST INDICATION: 74 years / Female  C23: Malignant neoplasm of gallbladder. COMPARISON: TECHNIQUE:  Multiplanar/multisequence MRI of the abdomen was performed without the administration of contrast. IV Contrast: FINDINGS: LOWER CHEST:   Unremarkable. LIVER: Normal in size and configuration. Multiple hepatic lesions measuring up to 2.3 cm are identified which demonstrate T2 hyperintensity and restricted diffusion suspicious for metastases. Limited evaluation of the hepatic veins and portal veins on this non-contrast MR is unremarkable. BILE DUCTS: No intrahepatic or extrahepatic bile duct dilation. GALLBLADDER: Patient is status post cholecystectomy. PANCREAS:  Unremarkable. ADRENAL GLANDS:  Unremarkable. SPLEEN:  Normal. KIDNEYS/PROXIMAL URETERS: No hydroureteronephrosis. No suspicious renal mass. BOWEL:   No dilated loops of bowel. PERITONEUM/RETROPERITONEUM: No mass. No ascites. LYMPH NODES: No abdominal lymphadenopathy. VASCULAR STRUCTURES:  No aneurysm. ABDOMINAL WALL:  Unremarkable. OSSEOUS STRUCTURES:  No suspicious osseous lesion. Impression: Interval cholecystectomy.  Multiple hepatic mass lesions suspicious for metastases. Workstation performed: [id number]     I personally reviewed and interpreted the above laboratory and imaging data. Discussion/Summary: 58-year-old female with a clinical T2N0M1 gallbladder carcinoma. Her MRI is concerning for liver metastasis, although the patient states she was found to have liver lesions several years ago. None of these were biopsied in the past.  It is unclear if these are even the same lesions. Nevertheless, her pathology is not concordant with the imaging and the pathology does not explain a mass lesion. I have discussed with interventional radiology, and the biopsy was difficult and they do not think a repeat biopsy would be beneficial by IR. Consequently, I have recommended that she undergo robotic liver biopsy, possible open liver biopsy. The risks were explained including bleeding, infection, need for further surgery, wound complications, bile leak and adjacent organ injury. Informed consent was obtained. We will schedule this at our earliest mutual convenience. We discussed that if this is indeed malignant, and chemotherapy would be the mainstay of treatment. If these lesions are benign I would then proceed with segment 4B/5 liver resection with portal node dissection. This was discussed in detail with the patient and her family. All of their questions were answered. This visit took 50 minutes of face-to-face time with the patient and greater than 50% time spent counseling and coordinating care.

## 2023-11-02 PROBLEM — A41.9 SEPSIS (HCC): Status: RESOLVED | Noted: 2023-09-03 | Resolved: 2023-11-02

## 2023-11-07 ENCOUNTER — APPOINTMENT (OUTPATIENT)
Dept: LAB | Facility: HOSPITAL | Age: 73
End: 2023-11-07
Attending: SURGERY
Payer: COMMERCIAL

## 2023-11-07 DIAGNOSIS — C23 ADENOCARCINOMA OF GALLBLADDER (HCC): ICD-10-CM

## 2023-11-07 LAB
ALBUMIN SERPL BCP-MCNC: 4.2 G/DL (ref 3.5–5)
ALP SERPL-CCNC: 72 U/L (ref 34–104)
ALT SERPL W P-5'-P-CCNC: 31 U/L (ref 7–52)
ANION GAP SERPL CALCULATED.3IONS-SCNC: 7 MMOL/L
APTT PPP: 27 SECONDS (ref 23–37)
AST SERPL W P-5'-P-CCNC: 20 U/L (ref 13–39)
BASOPHILS # BLD AUTO: 0.04 THOUSANDS/ÂΜL (ref 0–0.1)
BASOPHILS NFR BLD AUTO: 1 % (ref 0–1)
BILIRUB SERPL-MCNC: 0.32 MG/DL (ref 0.2–1)
BUN SERPL-MCNC: 20 MG/DL (ref 5–25)
CALCIUM SERPL-MCNC: 9.6 MG/DL (ref 8.4–10.2)
CHLORIDE SERPL-SCNC: 100 MMOL/L (ref 96–108)
CO2 SERPL-SCNC: 28 MMOL/L (ref 21–32)
CREAT SERPL-MCNC: 0.86 MG/DL (ref 0.6–1.3)
EOSINOPHIL # BLD AUTO: 0.16 THOUSAND/ÂΜL (ref 0–0.61)
EOSINOPHIL NFR BLD AUTO: 2 % (ref 0–6)
ERYTHROCYTE [DISTWIDTH] IN BLOOD BY AUTOMATED COUNT: 13.7 % (ref 11.6–15.1)
GFR SERPL CREATININE-BSD FRML MDRD: 66 ML/MIN/1.73SQ M
GLUCOSE SERPL-MCNC: 243 MG/DL (ref 65–140)
HCT VFR BLD AUTO: 37.5 % (ref 34.8–46.1)
HGB BLD-MCNC: 12.6 G/DL (ref 11.5–15.4)
IMM GRANULOCYTES # BLD AUTO: 0.03 THOUSAND/UL (ref 0–0.2)
IMM GRANULOCYTES NFR BLD AUTO: 0 % (ref 0–2)
INR PPP: 0.9 (ref 0.84–1.19)
LYMPHOCYTES # BLD AUTO: 1.92 THOUSANDS/ÂΜL (ref 0.6–4.47)
LYMPHOCYTES NFR BLD AUTO: 23 % (ref 14–44)
MCH RBC QN AUTO: 30.4 PG (ref 26.8–34.3)
MCHC RBC AUTO-ENTMCNC: 33.6 G/DL (ref 31.4–37.4)
MCV RBC AUTO: 90 FL (ref 82–98)
MONOCYTES # BLD AUTO: 0.58 THOUSAND/ÂΜL (ref 0.17–1.22)
MONOCYTES NFR BLD AUTO: 7 % (ref 4–12)
NEUTROPHILS # BLD AUTO: 5.64 THOUSANDS/ÂΜL (ref 1.85–7.62)
NEUTS SEG NFR BLD AUTO: 67 % (ref 43–75)
NRBC BLD AUTO-RTO: 0 /100 WBCS
PLATELET # BLD AUTO: 298 THOUSANDS/UL (ref 149–390)
PMV BLD AUTO: 9.5 FL (ref 8.9–12.7)
POTASSIUM SERPL-SCNC: 4.6 MMOL/L (ref 3.5–5.3)
PROT SERPL-MCNC: 7.4 G/DL (ref 6.4–8.4)
PROTHROMBIN TIME: 12.7 SECONDS (ref 11.6–14.5)
RBC # BLD AUTO: 4.15 MILLION/UL (ref 3.81–5.12)
SODIUM SERPL-SCNC: 135 MMOL/L (ref 135–147)
WBC # BLD AUTO: 8.37 THOUSAND/UL (ref 4.31–10.16)

## 2023-11-07 PROCEDURE — 80053 COMPREHEN METABOLIC PANEL: CPT

## 2023-11-07 PROCEDURE — 85610 PROTHROMBIN TIME: CPT

## 2023-11-07 PROCEDURE — 36415 COLL VENOUS BLD VENIPUNCTURE: CPT

## 2023-11-07 PROCEDURE — 85730 THROMBOPLASTIN TIME PARTIAL: CPT

## 2023-11-07 PROCEDURE — 85025 COMPLETE CBC W/AUTO DIFF WBC: CPT

## 2023-11-10 ENCOUNTER — DOCUMENTATION (OUTPATIENT)
Dept: HEMATOLOGY ONCOLOGY | Facility: CLINIC | Age: 73
End: 2023-11-10

## 2023-11-10 ENCOUNTER — ANESTHESIA EVENT (OUTPATIENT)
Dept: PERIOP | Facility: HOSPITAL | Age: 73
End: 2023-11-10
Payer: COMMERCIAL

## 2023-11-10 NOTE — PRE-PROCEDURE INSTRUCTIONS
Pre-Surgery Instructions:   Medication Instructions    acetaminophen (TYLENOL) 325 mg tablet Take Day of Surgery; Continue to take as prescribed including DOS with a small sip of water, unless usually taken at night     ALPRAZolam (XANAX) 1 mg tablet Take Day of Surgery; Continue to take as prescribed including DOS with a small sip of water, unless usually taken at night     amLODIPine (NORVASC) 5 mg tablet Take Day of Surgery; Continue to take as prescribed including DOS with a small sip of water, unless usually taken at night     aspirin (ECOTRIN LOW STRENGTH) 81 mg EC tablet Avoid 1 week prior to surgery - will begin holding 11/10    glipiZIDE (GLUCOTROL) 5 mg tablet Do not take day of surgery; continue as prescribed excluding DOS     metoprolol tartrate (LOPRESSOR) 50 mg tablet Take Day of Surgery; Continue to take as prescribed including DOS with a small sip of water, unless usually taken at night     sitaGLIPtin-metFORMIN (Janumet)  MG per tablet Do not take day of surgery; continue as prescribed excluding DOS     Medication instructions for day surgery reviewed. Please use only a sip of water to take your instructed medications. Avoid all over the counter vitamins, supplements and NSAIDS for one week prior to surgery per anesthesia guidelines. Tylenol is ok to take as needed. You will receive a call one business day prior to surgery with an arrival time and hospital directions. If your surgery is scheduled on a Monday, the hospital will be calling you on the Friday prior to your surgery. If you have not heard from anyone by 8pm, please call the hospital supervisor through the hospital  at [phone]. Marzena Azar [phone]). Do not eat or drink anything after midnight the night before your surgery, including candy, mints, lifesavers, or chewing gum. Do not drink alcohol 24hrs before your surgery. Try not to smoke at least 24hrs before your surgery.        Follow the pre surgery showering instructions as listed in the [facility] Surgical Experience Booklet” or otherwise provided by your surgeon's office. Do not use a blade to shave the surgical area 1 week before surgery. It is okay to use a clean electric clippers up to 24 hours before surgery. Do not apply any lotions, creams, including makeup, cologne, deodorant, or perfumes after showering on the day of your surgery. Do not use dry shampoo, hair spray, hair gel, or any type of hair products. No contact lenses, eye make-up, or artificial eyelashes. Remove nail polish, including gel polish, and any artificial, gel, or acrylic nails if possible. Remove all jewelry including rings and body piercing jewelry. Wear causal clothing that is easy to take on and off. Consider your type of surgery. Keep any valuables, jewelry, piercings at home. Please bring any specially ordered equipment (sling, braces) if indicated. Arrange for a responsible person to drive you to and from the hospital on the day of your surgery. Visitor Guidelines discussed. Call the surgeon's office with any new illnesses, exposures, or additional questions prior to surgery. Please reference your [facility] Surgical Experience Booklet” for additional information to prepare for your upcoming surgery.

## 2023-11-10 NOTE — PROGRESS NOTES
ncology Finance Advocacy Intake and Intervention  Oncology Finance Counselor/Advocate placed call to patient. This writer informed patient that this writer is here to assist patient with billing questions, financial assistance, payment/payment plans, quotes, copayment assistance, insurance optimization, and insurance navigation.    This writer conducted a thorough benefit review of copayment, deductible, and out of pocket cost. This information is documented below and has been reviewed with patient.     Copayment: NONE  Deductible: NONE  Out of Pocket Cost: NONE  Insurance optimization (Limited benefit vs self-pay): SELF PAY  Patient assistance status: NONE  Free Drug Applications: NONE  Interventions:  Called the pt w/interpretor# [id number]. Pts son  on the line & he sd that he speaks english & he doesn't need the interpretor so she disconnected.& I spoke w/pts son . I went over the [place] medicaid & the [employer] plan. I told him that I want to verify that plan & that after I get taht info I will call him back..  I called the ins & spoke to tripp call ref# [id number] he sd that this plan is active effective 10/01/22. He sd that that pa is out of the network & the pt has to stay in network. The pt has to stay in [place] for her services to be covered. He sd that this is an HMO. If the pt switched to a PPO plan & stays w/[employer] that would cover out of state but that wouldn't be effective until 10/01/23  Called the pt's son back & I gave him all that info he sd that he's going to talk to his mom & he will let me know what she decides to do.  We also talked about f/a thru the hosp & he's going to mention that to the pt as well. He sd that he will get back to me. He thanked me for the call  Weight (last 2 days)       None                Information above was review thoroughly with patient and patient was advise of possible assistance programs/interventions. If any question arise patient can contact this writer at below  information. This information was given to patient at time of contact.      Jamila Lindquist  Phone:[phone]  Email: [email or web address]

## 2023-11-14 ENCOUNTER — TELEPHONE (OUTPATIENT)
Dept: HEMATOLOGY ONCOLOGY | Facility: CLINIC | Age: 73
End: 2023-11-14

## 2023-11-14 NOTE — TELEPHONE ENCOUNTER
I am reaching out regarding your appointment with Dr. Nils Parra on 11/30/23. There has been a change to the providers schedule, and we will need to reschedule your appointment. I left a voicemail explaining to patient that this appointment will need to be rescheduled due to a change in the providers schedule.  Patient was advised to call [facility] [phone] to reschedule.      schedule on 11/16 or 12/7 in [place]

## 2023-11-15 ENCOUNTER — TELEPHONE (OUTPATIENT)
Dept: HEMATOLOGY ONCOLOGY | Facility: CLINIC | Age: 73
End: 2023-11-15

## 2023-11-15 ENCOUNTER — ANESTHESIA (OUTPATIENT)
Dept: PERIOP | Facility: HOSPITAL | Age: 73
End: 2023-11-15
Payer: COMMERCIAL

## 2023-11-15 ENCOUNTER — HOSPITAL ENCOUNTER (OUTPATIENT)
Facility: HOSPITAL | Age: 73
Setting detail: OUTPATIENT SURGERY
Discharge: HOME | End: 2023-11-15
Attending: SURGERY | Admitting: SURGERY
Payer: COMMERCIAL

## 2023-11-15 VITALS
WEIGHT: 140 LBS | DIASTOLIC BLOOD PRESSURE: 81 MMHG | RESPIRATION RATE: 15 BRPM | SYSTOLIC BLOOD PRESSURE: 148 MMHG | HEART RATE: 71 BPM | HEIGHT: 62 IN | BODY MASS INDEX: 25.76 KG/M2 | OXYGEN SATURATION: 97 % | TEMPERATURE: 97.5 F

## 2023-11-15 DIAGNOSIS — C23 ADENOCARCINOMA OF GALLBLADDER (HCC): ICD-10-CM

## 2023-11-15 LAB
ANION GAP SERPL CALCULATED.3IONS-SCNC: 9 MMOL/L
BASOPHILS # BLD AUTO: 0.04 THOUSANDS/ÂΜL (ref 0–0.1)
BASOPHILS NFR BLD AUTO: 0 % (ref 0–1)
BUN SERPL-MCNC: 20 MG/DL (ref 5–25)
CALCIUM SERPL-MCNC: 8.4 MG/DL (ref 8.4–10.2)
CARDIAC TROPONIN I PNL SERPL HS: 4 NG/L
CHLORIDE SERPL-SCNC: 106 MMOL/L (ref 96–108)
CO2 SERPL-SCNC: 21 MMOL/L (ref 21–32)
CREAT SERPL-MCNC: 0.65 MG/DL (ref 0.6–1.3)
EOSINOPHIL # BLD AUTO: 0.08 THOUSAND/ÂΜL (ref 0–0.61)
EOSINOPHIL NFR BLD AUTO: 1 % (ref 0–6)
ERYTHROCYTE [DISTWIDTH] IN BLOOD BY AUTOMATED COUNT: 14.2 % (ref 11.6–15.1)
GFR SERPL CREATININE-BSD FRML MDRD: 87 ML/MIN/1.73SQ M
GLUCOSE P FAST SERPL-MCNC: 214 MG/DL (ref 65–99)
GLUCOSE SERPL-MCNC: 180 MG/DL (ref 65–140)
GLUCOSE SERPL-MCNC: 214 MG/DL (ref 65–140)
GLUCOSE SERPL-MCNC: 214 MG/DL (ref 65–140)
HCT VFR BLD AUTO: 36.8 % (ref 34.8–46.1)
HGB BLD-MCNC: 11.7 G/DL (ref 11.5–15.4)
IMM GRANULOCYTES # BLD AUTO: 0.04 THOUSAND/UL (ref 0–0.2)
IMM GRANULOCYTES NFR BLD AUTO: 0 % (ref 0–2)
LYMPHOCYTES # BLD AUTO: 1.48 THOUSANDS/ÂΜL (ref 0.6–4.47)
LYMPHOCYTES NFR BLD AUTO: 14 % (ref 14–44)
MAGNESIUM SERPL-MCNC: 1.7 MG/DL (ref 1.9–2.7)
MCH RBC QN AUTO: 30.2 PG (ref 26.8–34.3)
MCHC RBC AUTO-ENTMCNC: 31.8 G/DL (ref 31.4–37.4)
MCV RBC AUTO: 95 FL (ref 82–98)
MONOCYTES # BLD AUTO: 0.53 THOUSAND/ÂΜL (ref 0.17–1.22)
MONOCYTES NFR BLD AUTO: 5 % (ref 4–12)
NEUTROPHILS # BLD AUTO: 8.29 THOUSANDS/ÂΜL (ref 1.85–7.62)
NEUTS SEG NFR BLD AUTO: 80 % (ref 43–75)
NRBC BLD AUTO-RTO: 0 /100 WBCS
PHOSPHATE SERPL-MCNC: 3.2 MG/DL (ref 2.3–4.1)
PLATELET # BLD AUTO: 230 THOUSANDS/UL (ref 149–390)
PMV BLD AUTO: 9.8 FL (ref 8.9–12.7)
POTASSIUM SERPL-SCNC: 4.3 MMOL/L (ref 3.5–5.3)
RBC # BLD AUTO: 3.88 MILLION/UL (ref 3.81–5.12)
SODIUM SERPL-SCNC: 136 MMOL/L (ref 135–147)
WBC # BLD AUTO: 10.46 THOUSAND/UL (ref 4.31–10.16)

## 2023-11-15 PROCEDURE — 84100 ASSAY OF PHOSPHORUS: CPT

## 2023-11-15 PROCEDURE — 88342 IMHCHEM/IMCYTCHM 1ST ANTB: CPT | Performed by: PATHOLOGY

## 2023-11-15 PROCEDURE — 88313 SPECIAL STAINS GROUP 2: CPT | Performed by: PATHOLOGY

## 2023-11-15 PROCEDURE — 83735 ASSAY OF MAGNESIUM: CPT

## 2023-11-15 PROCEDURE — 82948 REAGENT STRIP/BLOOD GLUCOSE: CPT

## 2023-11-15 PROCEDURE — 88341 IMHCHEM/IMCYTCHM EA ADD ANTB: CPT | Performed by: PATHOLOGY

## 2023-11-15 PROCEDURE — 84484 ASSAY OF TROPONIN QUANT: CPT

## 2023-11-15 PROCEDURE — NC001 PR NO CHARGE: Performed by: PHYSICIAN ASSISTANT

## 2023-11-15 PROCEDURE — S2900 ROBOTIC SURGICAL SYSTEM: HCPCS | Performed by: SURGERY

## 2023-11-15 PROCEDURE — 76998 US GUIDE INTRAOP: CPT | Performed by: SURGERY

## 2023-11-15 PROCEDURE — 85025 COMPLETE CBC W/AUTO DIFF WBC: CPT

## 2023-11-15 PROCEDURE — 80048 BASIC METABOLIC PNL TOTAL CA: CPT

## 2023-11-15 PROCEDURE — 49321 LAPAROSCOPY BIOPSY: CPT | Performed by: SURGERY

## 2023-11-15 PROCEDURE — 88307 TISSUE EXAM BY PATHOLOGIST: CPT | Performed by: PATHOLOGY

## 2023-11-15 RX ORDER — BUPIVACAINE HYDROCHLORIDE 2.5 MG/ML
INJECTION, SOLUTION EPIDURAL; INFILTRATION; INTRACAUDAL AS NEEDED
Status: DISCONTINUED | OUTPATIENT
Start: 2023-11-15 | End: 2023-11-15 | Stop reason: HOSPADM

## 2023-11-15 RX ORDER — SODIUM CHLORIDE 9 MG/ML
INJECTION, SOLUTION INTRAVENOUS CONTINUOUS PRN
Status: DISCONTINUED | OUTPATIENT
Start: 2023-11-15 | End: 2023-11-15

## 2023-11-15 RX ORDER — LIDOCAINE HYDROCHLORIDE 20 MG/ML
INJECTION, SOLUTION EPIDURAL; INFILTRATION; INTRACAUDAL; PERINEURAL AS NEEDED
Status: DISCONTINUED | OUTPATIENT
Start: 2023-11-15 | End: 2023-11-15

## 2023-11-15 RX ORDER — CEFAZOLIN SODIUM 1 G/50ML
1000 SOLUTION INTRAVENOUS ONCE
Status: COMPLETED | OUTPATIENT
Start: 2023-11-15 | End: 2023-11-15

## 2023-11-15 RX ORDER — ACETAMINOPHEN 325 MG/1
650 TABLET ORAL EVERY 4 HOURS PRN
Status: DISCONTINUED | OUTPATIENT
Start: 2023-11-15 | End: 2023-11-15 | Stop reason: HOSPADM

## 2023-11-15 RX ORDER — SODIUM CHLORIDE, SODIUM LACTATE, POTASSIUM CHLORIDE, CALCIUM CHLORIDE 600; 310; 30; 20 MG/100ML; MG/100ML; MG/100ML; MG/100ML
125 INJECTION, SOLUTION INTRAVENOUS CONTINUOUS
Status: DISCONTINUED | OUTPATIENT
Start: 2023-11-15 | End: 2023-11-15 | Stop reason: HOSPADM

## 2023-11-15 RX ORDER — FAMOTIDINE 20 MG/1
20 TABLET, FILM COATED ORAL DAILY
COMMUNITY

## 2023-11-15 RX ORDER — EPHEDRINE SULFATE 50 MG/ML
INJECTION INTRAVENOUS AS NEEDED
Status: DISCONTINUED | OUTPATIENT
Start: 2023-11-15 | End: 2023-11-15

## 2023-11-15 RX ORDER — PROPOFOL 10 MG/ML
INJECTION, EMULSION INTRAVENOUS AS NEEDED
Status: DISCONTINUED | OUTPATIENT
Start: 2023-11-15 | End: 2023-11-15

## 2023-11-15 RX ORDER — OXYCODONE HYDROCHLORIDE 5 MG/1
2.5 TABLET ORAL EVERY 4 HOURS PRN
Status: DISCONTINUED | OUTPATIENT
Start: 2023-11-15 | End: 2023-11-15 | Stop reason: HOSPADM

## 2023-11-15 RX ORDER — FENTANYL CITRATE/PF 50 MCG/ML
25 SYRINGE (ML) INJECTION
Status: DISCONTINUED | OUTPATIENT
Start: 2023-11-15 | End: 2023-11-15 | Stop reason: HOSPADM

## 2023-11-15 RX ORDER — ONDANSETRON 2 MG/ML
INJECTION INTRAMUSCULAR; INTRAVENOUS AS NEEDED
Status: DISCONTINUED | OUTPATIENT
Start: 2023-11-15 | End: 2023-11-15

## 2023-11-15 RX ORDER — ONDANSETRON 2 MG/ML
4 INJECTION INTRAMUSCULAR; INTRAVENOUS EVERY 6 HOURS PRN
Status: DISCONTINUED | OUTPATIENT
Start: 2023-11-15 | End: 2023-11-15 | Stop reason: HOSPADM

## 2023-11-15 RX ORDER — ONDANSETRON 2 MG/ML
4 INJECTION INTRAMUSCULAR; INTRAVENOUS ONCE
Status: DISCONTINUED | OUTPATIENT
Start: 2023-11-15 | End: 2023-11-15 | Stop reason: HOSPADM

## 2023-11-15 RX ORDER — PHENYLEPHRINE HCL IN 0.9% NACL 1 MG/10 ML
SYRINGE (ML) INTRAVENOUS AS NEEDED
Status: DISCONTINUED | OUTPATIENT
Start: 2023-11-15 | End: 2023-11-15

## 2023-11-15 RX ORDER — FENTANYL CITRATE 50 UG/ML
INJECTION, SOLUTION INTRAMUSCULAR; INTRAVENOUS AS NEEDED
Status: DISCONTINUED | OUTPATIENT
Start: 2023-11-15 | End: 2023-11-15

## 2023-11-15 RX ORDER — ROCURONIUM BROMIDE 10 MG/ML
INJECTION, SOLUTION INTRAVENOUS AS NEEDED
Status: DISCONTINUED | OUTPATIENT
Start: 2023-11-15 | End: 2023-11-15

## 2023-11-15 RX ORDER — OXYCODONE HYDROCHLORIDE 5 MG/1
5 TABLET ORAL EVERY 6 HOURS PRN
Status: DISCONTINUED | OUTPATIENT
Start: 2023-11-15 | End: 2023-11-15 | Stop reason: HOSPADM

## 2023-11-15 RX ORDER — SUCCINYLCHOLINE/SOD CL,ISO/PF 100 MG/5ML
SYRINGE (ML) INTRAVENOUS AS NEEDED
Status: DISCONTINUED | OUTPATIENT
Start: 2023-11-15 | End: 2023-11-15

## 2023-11-15 RX ORDER — ATROPINE SULFATE 1 MG/ML
INJECTION, SOLUTION INTRAVENOUS AS NEEDED
Status: DISCONTINUED | OUTPATIENT
Start: 2023-11-15 | End: 2023-11-15

## 2023-11-15 RX ORDER — HYDROMORPHONE HCL/PF 1 MG/ML
SYRINGE (ML) INJECTION AS NEEDED
Status: DISCONTINUED | OUTPATIENT
Start: 2023-11-15 | End: 2023-11-15

## 2023-11-15 RX ORDER — MIDAZOLAM HYDROCHLORIDE 2 MG/2ML
INJECTION, SOLUTION INTRAMUSCULAR; INTRAVENOUS AS NEEDED
Status: DISCONTINUED | OUTPATIENT
Start: 2023-11-15 | End: 2023-11-15

## 2023-11-15 RX ADMIN — ROCURONIUM BROMIDE 30 MG: 50 INJECTION, SOLUTION INTRAVENOUS at 11:58

## 2023-11-15 RX ADMIN — FENTANYL CITRATE 50 MCG: 50 INJECTION INTRAMUSCULAR; INTRAVENOUS at 11:33

## 2023-11-15 RX ADMIN — FENTANYL CITRATE 25 MCG: 50 INJECTION INTRAMUSCULAR; INTRAVENOUS at 14:23

## 2023-11-15 RX ADMIN — FENTANYL CITRATE 50 MCG: 50 INJECTION INTRAMUSCULAR; INTRAVENOUS at 13:28

## 2023-11-15 RX ADMIN — Medication 100 MG: at 11:06

## 2023-11-15 RX ADMIN — EPHEDRINE SULFATE 15 MG: 50 INJECTION INTRAVENOUS at 11:15

## 2023-11-15 RX ADMIN — SUGAMMADEX 100 MG: 100 INJECTION, SOLUTION INTRAVENOUS at 12:57

## 2023-11-15 RX ADMIN — HYDROMORPHONE HYDROCHLORIDE 0.5 MG: 1 INJECTION, SOLUTION INTRAMUSCULAR; INTRAVENOUS; SUBCUTANEOUS at 12:21

## 2023-11-15 RX ADMIN — PROPOFOL 130 MG: 10 INJECTION, EMULSION INTRAVENOUS at 11:06

## 2023-11-15 RX ADMIN — FENTANYL CITRATE 25 MCG: 50 INJECTION INTRAMUSCULAR; INTRAVENOUS at 13:57

## 2023-11-15 RX ADMIN — FENTANYL CITRATE 50 MCG: 50 INJECTION INTRAMUSCULAR; INTRAVENOUS at 13:23

## 2023-11-15 RX ADMIN — ACETAMINOPHEN 650 MG: 325 TABLET, FILM COATED ORAL at 18:20

## 2023-11-15 RX ADMIN — Medication 200 MCG: at 11:21

## 2023-11-15 RX ADMIN — LIDOCAINE HYDROCHLORIDE 80 MG: 20 INJECTION, SOLUTION EPIDURAL; INFILTRATION; INTRACAUDAL at 11:06

## 2023-11-15 RX ADMIN — CEFAZOLIN SODIUM 1000 MG: 1 SOLUTION INTRAVENOUS at 11:13

## 2023-11-15 RX ADMIN — INSULIN HUMAN 2 UNITS: 100 INJECTION, SOLUTION PARENTERAL at 13:48

## 2023-11-15 RX ADMIN — ATROPINE SULFATE 0.2 MG: 1 INJECTION, SOLUTION INTRAVENOUS at 11:20

## 2023-11-15 RX ADMIN — FENTANYL CITRATE 25 MCG: 50 INJECTION INTRAMUSCULAR; INTRAVENOUS at 13:51

## 2023-11-15 RX ADMIN — ONDANSETRON 4 MG: 2 INJECTION INTRAMUSCULAR; INTRAVENOUS at 12:41

## 2023-11-15 RX ADMIN — SODIUM CHLORIDE, SODIUM LACTATE, POTASSIUM CHLORIDE, AND CALCIUM CHLORIDE 125 ML/HR: .6; .31; .03; .02 INJECTION, SOLUTION INTRAVENOUS at 09:20

## 2023-11-15 RX ADMIN — Medication 200 MCG: at 11:15

## 2023-11-15 RX ADMIN — MIDAZOLAM 2 MG: 1 INJECTION INTRAMUSCULAR; INTRAVENOUS at 10:58

## 2023-11-15 RX ADMIN — Medication 200 MCG: at 12:07

## 2023-11-15 RX ADMIN — SODIUM CHLORIDE: 0.9 INJECTION, SOLUTION INTRAVENOUS at 11:11

## 2023-11-15 RX ADMIN — FENTANYL CITRATE 50 MCG: 50 INJECTION INTRAMUSCULAR; INTRAVENOUS at 11:40

## 2023-11-15 RX ADMIN — ACETAMINOPHEN 650 MG: 325 TABLET, FILM COATED ORAL at 14:47

## 2023-11-15 RX ADMIN — SUGAMMADEX 100 MG: 100 INJECTION, SOLUTION INTRAVENOUS at 13:07

## 2023-11-15 RX ADMIN — HYDROMORPHONE HYDROCHLORIDE 0.5 MG: 1 INJECTION, SOLUTION INTRAMUSCULAR; INTRAVENOUS; SUBCUTANEOUS at 11:41

## 2023-11-15 RX ADMIN — ROCURONIUM BROMIDE 45 MG: 50 INJECTION, SOLUTION INTRAVENOUS at 11:08

## 2023-11-15 RX ADMIN — ROCURONIUM BROMIDE 5 MG: 50 INJECTION, SOLUTION INTRAVENOUS at 11:06

## 2023-11-15 RX ADMIN — EPHEDRINE SULFATE 10 MG: 50 INJECTION INTRAVENOUS at 11:19

## 2023-11-15 NOTE — ANESTHESIA POSTPROCEDURE EVALUATION
Post-Op Assessment Note    CV Status:  Stable  Pain Score: 3    Pain management: adequate       Mental Status:  Sleepy   Hydration Status:  Stable   PONV Controlled:  None   Airway Patency:  Patent     Post Op Vitals Reviewed: Yes    No anethesia notable event occurred.     Staff: CRNA               /66 (11/15/23 1333)    Temp 98.1 °F (36.7 °C) (11/15/23 1333)    Pulse 71 (11/15/23 1333)   Resp 20 (11/15/23 1333)    SpO2 99 % (11/15/23 1333)

## 2023-11-15 NOTE — OP NOTE
OPERATIVE REPORT  PATIENT NAME: Quinn Masterson    :  1950  MRN: [id number]  Pt Location: [facility] OR ROOM 15    SURGERY DATE: 11/15/2023    Surgeon(s) and Role:     * Carl Nascimento MD - Primary     * Riri Manrique MD - Assisting    Preop Diagnosis:  Adenocarcinoma of gallbladder ([address]) [C23]    Post-Op Diagnosis Codes:     * Adenocarcinoma of gallbladder ([address]) [C23]    Procedure(s):  LIVER BIOPSY ROBOTIC  Robotic liver biopsy  Intraoperative ultrasound of the liver  Lysis of adhesions laparoscopically    Specimen(s):  ID Type Source Tests Collected by Time Destination   1 : liver biopsy Tissue Liver TISSUE EXAM Carl Nascimento MD 11/15/2023 12:45 PM        Estimated Blood Loss:   25 mL    Drains:  * No LDAs found *    Anesthesia Type:   General    Operative Indications:  Adenocarcinoma of gallbladder ([address]) [C21  75-year-old female with gallbladder cancer. She has suspicious lesions on her liver. IR biopsy was inconclusive, consequently it was recommended that she undergo surgical biopsy. Risk and benefits were explained. Informed consent was obtained. Patient was brought to the operating room. Operative Findings:  Cirrhotic liver  Intraoperative ultrasound identified the mass which was excised for biopsy. Complications:   None    Procedure and Technique:  After identifying the patient, general anesthesia was achieved. Patient was prepped and draped in the usual sterile fashion. Time Out was performed. A varies needle was placed at Prado's point. Once we confirmed this was in the abdomen with a saline drop test, the abdomen was insufflated to 15 mmHg using carbon dioxide. We now placed our 8 mm camera port in the right lower quadrant. This was done using an Optiview technique. Once we confirmed we were in the abdomen we visualized the varees needle site and there was no evidence of bleeding or injury to any underlying structures. The varres needle was removed.   At this point, under direct vision after local anesthesia was infiltrated in the skin, our additional 8 mm trocars were placed as well as a 12 mm assistant port. There were several adhesions of up to 2 the anterior abdominal wall and these were sharply lysed using the EndoShears. Once we perform this, the robot was docked and the instruments were placed. A fenestrated bipolar was in arm 1. The camera was not port 2. A Endo shear was in arm 3 and a tip was in arm 4. At this point inspecting the liver, the liver did appear cirrhotic. We now looked at the liver. In the areas where we could see bulges corresponding to the MRI, we did intraoperative ultrasound. We also saw the site of the previous IR biopsy. The ultrasound did visualize the area just lateral to the falciform ligament. We now scored the area over the ultrasound. We now using the EndoShears started to resect this mass along the surface. Once we did this and were able to slowly elevated, we placed a V-Loc suture for traction. This was now elevated using the tip up. Now using the fenestrated bipolar and the EndoShears, we were able to excise the mass in total.  There was excellent hemostasis. The mass was placed in an Endobag. The wound was now copiously irrigated and there was excellent hemostasis. Floseal was placed in the resection bed. There was some bleeding from the peritoneum near the falciform that was cauterized with the EndoShears. At this point the wound was once again irrigated and there was excellent hemostasis. The specimen was brought through the assistant port. The instruments were removed and the robot was undocked. The trocars were removed under direct vision. The mass was palpated in the specimen and this was sent to pathology. The 12 mm port site was now closed with an 0 Vicryl suture in a figure-of-eight fashion. Skin incisions were approximated with 4-0 Monocryl suture aseptically fashion and skin glue.   Patient was then extubated and taken to the recovery room in stable condition having tolerated the procedure well. I was present and participated in all aspects of this procedure. I was present for the entire procedure., A qualified resident physician was not available. , and A physician assistant was required during the procedure for retraction, tissue handling, dissection and suturing.     Patient Disposition:  PACU         SIGNATURE: Radu Samaniego MD  DATE: November 15, 2023  TIME: 1:13 PM

## 2023-11-15 NOTE — INTERVAL H&P NOTE
H&P reviewed. After examining the patient I find no changes in the patients condition since the H&P had been written.     Vitals:    11/15/23 0819   BP: 163/85   Pulse: 62   Temp: (!) 97.1 °F (36.2 °C)   SpO2: 98%

## 2023-11-15 NOTE — TELEPHONE ENCOUNTER
I am reaching out regarding your appointment with Dr. Tiara Fritz on 11/30/23. There has been a change to the providers schedule, and we will need to reschedule your appointment. I left a voicemail explaining to patient that this appointment will need to be rescheduled due to a change in the providers schedule. Patient was advised to call [facility] [phone] to reschedule.

## 2023-11-15 NOTE — DISCHARGE INSTRUCTIONS
Discharge instructions    1. General: You will feel pulling sensations around the wound and/or aches and pains around the incisions. This is normal. Even minor surgery is a change in your body and this is your body’s way of reaction to it. If you have had abdominal surgery, it may help to support the incision with a small pillow or blanket for comfort when moving or coughing. 2. Wound care:    Glue - Leave glue alone, it will fall off on its own, no need for an additional dressings    3. Water: You may shower over the wound, unless there are drain tubes left in place. Do not bathe or use a pool or hot tub until cleared by the physician. You may shower right over the staples, glue or Steri-Strips and rinse wound with soapy water but do not scrub incision pat dry when you are done. 4. Activity: You may go up and down stairs, walk as much as you are comfortable, but walk at least 3 times each day. If you have had abdominal or hernia surgery, do not lift anything heavier than 15 pounds for at least 4 weeks. 5. Diet: You may resume a regular diet. If you had a same-day surgery or overnight stay surgery, you may wish to eat lightly for a few days: soups, crackers, and sandwiches. You may resume a regular diet when ready. 6. Medications: Resume all of your previous medications, unless told otherwise by the doctor. Tylenol and ibuoprofen is always fine, unless you are taking any narcotic pain medication containing Tylenol (such as Percocet, Darvocet, Vicodin, or anything containing acetaminophen). Do not take Tylenol if you're taking these medications. You do not need to take the narcotic pain medications unless you are having significant pain and discomfort. 7. Driving: You will need someone to drive you home on the day of surgery. Do not drive or make any important decisions while on narcotic pain medication or 24 hours and after anesthesia or sedation for surgery.  Generally, you may drive when your off all narcotic pain medications, and you can turn in your seat comfortably to check your blind spot. 8. Upset Stomach: You may take Maalox, Tums, or similar items for an upset stomach. If your narcotic pain medication causes an upset stomach, do not take it on an empty stomach. Try taking it with at least some crackers or toast.     9. Constipation: Patients often experienced constipation after surgery. You may take over-the-counter medication for this, such as Metamucil, Senokot, Dulcolax, milk of magnesia, etc. You may take a suppository unless you have had anorectal surgery such as a procedure on your hemorrhoids. If you experience significant nausea or vomiting after abdominal surgery, call the office before trying any of these medications. 10. Call the office: If you are experiencing any of the following, fevers above 101.5°, significant nausea or vomiting, if the wound develops drainage and/or is excessive redness around the wound, or if you have significant diarrhea or other worsening symptoms. 11. Pain: You may be given a prescription for pain. This will be given to the hospital, the day of surgery. 12. Sexual Activity: You may resume sexual activity when you feel ready and comfortable and your incision is sealed and healed without apparent infection risk.

## 2023-11-15 NOTE — ANESTHESIA PREPROCEDURE EVALUATION
Procedure:  LIVER BIOPSY ROBOTIC, POSSIBLE OPEN (Abdomen)    73 yo F with PMHx of gallbladder adenoca. Denies previous complications from anesthesia, MORRIS, nonsmoker. METS>4, NPO>8h. Took metoprolol this AM.    Has been having intermittent chest pain for the past week lasting a few seconds, non-exertional, worsened by deep breathing. Denies limitation in functional status, dizziness, syncope, palpitations. Denies fevers, chills. Also has been having increasing GERD symptoms, dec appetite since her IR biopsy. Remote h/o cath, states they told her it was normal, denies stents or intervention. Had cath then for exertional chest pain. Was prescribed ASA 81 by her PCP. Relevant Problems   ANESTHESIA (within normal limits)      CARDIO   (+) Hypertension      ENDO (within normal limits)      /RENAL (within normal limits)      HEMATOLOGY   (+) Acute blood loss anemia      NEURO/PSYCH (within normal limits)      PULMONARY (within normal limits)        Lab Results   Component Value Date/Time    WBC 8.37 11/07/2023 05:03 PM    RBC 4.15 11/07/2023 05:03 PM    HGB 12.6 11/07/2023 05:03 PM    HCT 37.5 11/07/2023 05:03 PM     11/07/2023 05:03 PM     Physical Exam    Airway    Mallampati score: II  TM Distance: >3 FB  Neck ROM: full     Dental    lower dentures and upper dentures    Cardiovascular  Rhythm: regular, Rate: normal, Cardiovascular exam normal    Pulmonary  Pulmonary exam normal     Other Findings        Anesthesia Plan  ASA Score- 3     Anesthesia Type- general with ASA Monitors. Additional Monitors:     Airway Plan: ETT. Plan Factors-Exercise tolerance (METS): >4 METS. Chart reviewed. EKG reviewed. Imaging results reviewed. Existing labs reviewed. Patient summary reviewed. Patient is not a current smoker. Obstructive sleep apnea risk education given perioperatively. Induction- intravenous. Postoperative Plan- Plan for postoperative opioid use.  Planned trial extubation    Informed Consent- Anesthetic plan and risks discussed with patient. I personally reviewed this patient with the CRNA. Discussed and agreed on the Anesthesia Plan with the CRNA. Celia Pizarro

## 2023-11-17 ENCOUNTER — TELEPHONE (OUTPATIENT)
Dept: HEMATOLOGY ONCOLOGY | Facility: CLINIC | Age: 73
End: 2023-11-17

## 2023-11-17 NOTE — TELEPHONE ENCOUNTER
I am reaching out regarding your appointment with Dr. Maura Cassidy  on 11/30/23. There has been a change to the providers schedule, and we will need to reschedule your appointment. I left a voicemail explaining to patient that this appointment will need to be rescheduled due to a change in the providers schedule. Appointment has been cancelled. Patient was advised to call [facility] [phone] to reschedule.     Letter sent

## 2023-11-21 PROCEDURE — 88341 IMHCHEM/IMCYTCHM EA ADD ANTB: CPT | Performed by: PATHOLOGY

## 2023-11-21 PROCEDURE — 88313 SPECIAL STAINS GROUP 2: CPT | Performed by: PATHOLOGY

## 2023-11-21 PROCEDURE — 88307 TISSUE EXAM BY PATHOLOGIST: CPT | Performed by: PATHOLOGY

## 2023-11-21 PROCEDURE — 88342 IMHCHEM/IMCYTCHM 1ST ANTB: CPT | Performed by: PATHOLOGY

## 2023-12-15 ENCOUNTER — OFFICE VISIT (OUTPATIENT)
Dept: SURGICAL ONCOLOGY | Facility: CLINIC | Age: 73
End: 2023-12-15
Payer: COMMERCIAL

## 2023-12-15 VITALS
OXYGEN SATURATION: 98 % | TEMPERATURE: 97.4 F | WEIGHT: 147 LBS | SYSTOLIC BLOOD PRESSURE: 138 MMHG | BODY MASS INDEX: 26.89 KG/M2 | HEART RATE: 70 BPM | DIASTOLIC BLOOD PRESSURE: 80 MMHG

## 2023-12-15 DIAGNOSIS — C23 ADENOCARCINOMA OF GALLBLADDER (HCC): Primary | ICD-10-CM

## 2023-12-15 PROCEDURE — 99215 OFFICE O/P EST HI 40 MIN: CPT | Performed by: SURGERY

## 2023-12-15 NOTE — PROGRESS NOTES
Surgical Oncology Follow Up       [address] [facility]  [address] [place] [id number] [phone]    Lizzie Nash  1950  [id number]  [address] [facility] [address] [id number] [phone]    Diagnoses and all orders for this visit:    Adenocarcinoma of gallbladder [facility])  -     Ambulatory referral to Hematology / Oncology; Future  -     Ambulatory referral to Radiation Oncology; Future        Chief Complaint   Patient presents with    Post-op       Return in about 4 weeks (around 1/12/2024) for Office Visit. Oncology History   Adenocarcinoma of gallbladder ([address])   9/2/2023 Surgery    Laparoscopic cholecystectomy:  Gallbladder, Gallbladder and contents , cholecystectomy:  -Moderately differentiated adenocarcinoma arising in a background of erosive chronic cholecystitis with high grade surface dysplasia and dense serosal adhesions.  -Tumor is present in perimuscular fibrous tissue (AJCC 8th edition pT2.) The surgical margins are uninvolved. Perineural invasion identified. No vascular invasion identified.   -Cholelithiasis. 9/28/2023 Initial Diagnosis    Adenocarcinoma of gallbladder ([address])     10/13/2023 -  Cancer Staged    Staging form: Gallbladder, AJCC 8th Edition  - Clinical: cT2, cN0, cM0 - Signed by Radu Samaniego MD on 10/13/2023  Total positive nodes: 0       10/19/2023 -  Cancer Staged    Staging form: Gallbladder, AJCC 8th Edition  - Pathologic stage from 10/19/2023: Stage IVB (pT2, pN0, cM1) - Signed by Radu Samaniego MD on 10/19/2023  Stage prefix: Initial diagnosis  Total positive nodes: 0               History of Present Illness: Patient returns after her robotic liver biopsy. The suspicious masses were actually consistent with hemangiomas. She comes in now to discuss further therapy.   She still has some right upper quadrant discomfort, but this started after the IR liver biopsy. Her appetite is fair. No nausea or vomiting. Review of Systems  Complete ROS Surg Onc:   Complete ROS Surg Onc:   Constitutional: The patient denies new or recent history of general fatigue, no recent weight loss, no change in appetite. Eyes: No complaints of visual problems, no scleral icterus. ENT: no complaints of ear pain, no hoarseness, no difficulty swallowing,  no tinnitus and no new masses in head, oral cavity, or neck. Cardiovascular: No complaints of chest pain, no palpitations, no ankle edema. Respiratory: No complaints of shortness of breath, no cough. Gastrointestinal: No complaints of jaundice, no bloody stools, no pale stools. Genitourinary: No complaints of dysuria, no hematuria, no nocturia, no frequent urination, no urethral discharge. Musculoskeletal: No complaints of weakness, paralysis, joint stiffness or arthralgias. Integumentary: No complaints of rash, no new lesions. Neurological: No complaints of convulsions, no seizures, no dizziness. Hematologic/Lymphatic: No complaints of easy bruising. Endocrine:  No hot or cold intolerance. No polydipsia, polyphagia, or polyuria. Allergy/immunology:  No environmental allergies. No food allergies. Not immunocompromised. Skin:  No pallor or rash. No wound.         Patient Active Problem List   Diagnosis    Acute cholecystitis    Near syncope    Diabetes (HCC)    Hypertension    Elevated TSH    Hyperglycemia    Lightheadedness    Elevated d-dimer    Acute blood loss anemia    Adenocarcinoma of gallbladder ([address])     Past Medical History:   Diagnosis Date    Diabetes mellitus ([address] [facility])     Hypertension      Past Surgical History:   Procedure Laterality Date    CATARACT EXTRACTION       SECTION      CHOLECYSTECTOMY LAPAROSCOPIC N/A 2023    Procedure: CHOLECYSTECTOMY LAPAROSCOPIC;  Surgeon: Crescencio Cruz DO;  Location: [facility];  Service: General    IR BIOPSY LIVER MASS  10/24/2023     No family history on file. Social History     Socioeconomic History    Marital status: /Civil Union     Spouse name: Not on file    Number of children: Not on file    Years of education: Not on file    Highest education level: Not on file   Occupational History    Not on file   Tobacco Use    Smoking status: Never     Passive exposure: Never    Smokeless tobacco: Never   Vaping Use    Vaping status: Never Used   Substance and Sexual Activity    Alcohol use: Never    Drug use: Not Currently    Sexual activity: Not on file   Other Topics Concern    Not on file   Social History Narrative    Not on file     Social Determinants of Health     Financial Resource Strain: Not on file   Food Insecurity: No Food Insecurity (8/31/2023)    Hunger Vital Sign     Worried About Running Out of Food in the Last Year: Never true     Ran Out of Food in the Last Year: Never true   Transportation Needs: No Transportation Needs (8/31/2023)    PRAPARE - Transportation     Lack of Transportation (Medical): No     Lack of Transportation (Non-Medical): No   Physical Activity: Not on file   Stress: Not on file   Social Connections: Not on file   Intimate Partner Violence: Not on file   Housing Stability: Low Risk  (8/31/2023)    Housing Stability Vital Sign     Unable to Pay for Housing in the Last Year: No     Number of Places Lived in the Last Year: 2     Unstable Housing in the Last Year: No       Current Outpatient Medications:     acetaminophen (TYLENOL) 325 mg tablet, Take 2 tablets (650 mg total) by mouth every 6 (six) hours as needed for mild pain, headaches or fever, Disp: , Rfl: 0    ALPRAZolam (XANAX) 1 mg tablet, Take 1 tablet (1 mg total) by mouth see administration instructions Take 1 tablet 30 minutes prior to MRI, take second tablet immediately before MRI if needed. , Disp: 2 tablet, Rfl: 0    amLODIPine (NORVASC) 5 mg tablet, Take 5 mg by mouth daily at bedtime, Disp: , Rfl:     aspirin (ECOTRIN LOW STRENGTH) 81 mg EC tablet, Take 81 mg by mouth daily, Disp: , Rfl:     famotidine (PEPCID) 20 mg tablet, Take 20 mg by mouth daily, Disp: , Rfl:     glipiZIDE (GLUCOTROL) 5 mg tablet, Take 10 mg by mouth 2 (two) times a day, Disp: , Rfl:     metoprolol tartrate (LOPRESSOR) 50 mg tablet, Take 50 mg by mouth daily, Disp: , Rfl:     sitaGLIPtin-metFORMIN (Janumet)  MG per tablet, Take 1 tablet by mouth 2 (two) times a day with meals, Disp: , Rfl:     temazepam (RESTORIL) 22.5 MG capsule, Take 30 mg by mouth daily at bedtime as needed for sleep, Disp: , Rfl:     naloxone (NARCAN) 4 mg/0.1 mL nasal spray, Administer 1 spray into a nostril. If no response after 2-3 minutes, give another dose in the other nostril using a new spray. (Patient not taking: Reported on 12/15/2023), Disp: 1 each, Rfl: 1    oxyCODONE (Roxicodone) 5 immediate release tablet, Take 1 tablet (5 mg total) by mouth every 6 (six) hours as needed for moderate pain Max Daily Amount: 20 mg (Patient not taking: Reported on 12/15/2023), Disp: 10 tablet, Rfl: 0  Allergies   Allergen Reactions    Cortisone Chest Pain    Dye [Iodinated Contrast Media] Chest Pain    Trulicity [Dulaglutide] Bradycardia     Vitals:    12/15/23 1547   BP: 138/80   Pulse: 70   Temp: (!) 97.4 °F (36.3 °C)   SpO2: 98%       Physical Exam  Constitutional: General appearance: The Patient is well-developed and well-nourished who appears the stated age in no acute distress. Patient is pleasant and talkative. HEENT:  Normocephalic. Sclerae are anicteric. Mucous membranes are moist. Neck is supple without adenopathy. No JVD. Chest: The lungs are clear to auscultation. Cardiac: Heart is regular rate. Abdomen: Abdomen is soft, tender in the right upper quadrant, non-distended and without masses. No guarding or peritoneal signs  Extremities: There is no clubbing or cyanosis. There is no edema. Symmetric.   Neuro: Grossly nonfocal. Gait is normal.     Lymphatic: No evidence of cervical adenopathy bilaterally. No evidence of axillary adenopathy bilaterally. Skin: Warm, anicteric. Psych:  Patient is pleasant and talkative. Breasts:        Pathology:  Final Diagnosis   A. Liver (wedge resection)  - Hemangioma present at cauterized margin  - Cirrhosis  - Steatohepatitis     Comment:  - The hemangioma is highlighted by CD31. D2-40 is negative in the hemangioma. Trichrome and PAS stains confirm cirrhosis. PAS-D stain shows no PAS-D positive hyaline globules as would be seen in AAT deficiency. The iron stain show no significant iron deposition. The reticulin shows hepatic plates of 1-2 cells in thickness.   - The patient's history of gallbladder carcinoma is noted. Interpretation performed at [facility], [address] [place] [address], \[facility] [place] [facility]\"", [address] [place] [address]      Electronically signed by Sharon Vera MD on 11/21/2023 at  9:42 AM       Labs:      Imaging  No results found. I personally reviewed and interpreted the above laboratory and imaging data. Discussion/Summary: 75-year-old female with a clinical T2N0M0 gallbladder carcinoma. The liver lesions were suspicious by MRI, but on final surgical biopsy were benign hemangiomas. At this time, I have recommended that she undergo segment 4B/5 liver resection with portal lymph node dissection. She is not inclined to have any further surgical intervention. I discussed that this would be the standard of care, and this should be followed by chemo and radiation. She does not wish to have surgery, I would recommend that she undergo chemoradiation. If she declines this option then she will just be observed. I did discuss that her initial tumor was on the peritoneal side of the gallbladder and there was perineural invasion. I did discuss that the perineural invasion is a high risk feature.   We discussed that with T2 gallbladder carcinoma, there can be a 20% risk of recurrence in 5 years as well as a 30% risk of carlee metastasis. Consequently, I reiterated that my recommendation would be to undergo surgical intervention followed by chemoradiation. Since she really does not wish to do this, I will make sure she is set up with medical and radiation oncology. She will meet with them to discuss chemoradiation options. I will see her back after she has met with them to finalize treatment plans and surveillance. She and her family are agreeable to this. All of their questions were answered.

## 2023-12-15 NOTE — LETTER
December 15, 2023     Noe Casey DO  [facility]  [address]  [place]    Patient: Natanael Xiao   YOB: 1950   Date of Visit: 12/15/2023       Dear Dr. Macrina Ortiz: Thank you for referring Natanael Xiao to me for evaluation. Below are my notes for this consultation. If you have questions, please do not hesitate to call me. I look forward to following your patient along with you. Sincerely,        Rian Traylor MD        CC: MD Mercedes Cummins MD Lajune Dickens, MD  12/15/2023  4:49 PM  Sign when Signing Visit               Surgical Oncology Follow Up       [address] [place] [address] [facility] [place] [phone]  [address] [place] [address]  1950  [id number]  [address] [address] [facility] [place] [id number] [phone]    Diagnoses and all orders for this visit:    Adenocarcinoma of gallbladder [facility])  -     Ambulatory referral to Hematology / Oncology; Future  -     Ambulatory referral to Radiation Oncology; Future        Chief Complaint   Patient presents with   • Post-op       Return in about 4 weeks (around 1/12/2024) for Office Visit. Oncology History   Adenocarcinoma of gallbladder ([address])   9/2/2023 Surgery    Laparoscopic cholecystectomy:  Gallbladder, Gallbladder and contents , cholecystectomy:  -Moderately differentiated adenocarcinoma arising in a background of erosive chronic cholecystitis with high grade surface dysplasia and dense serosal adhesions.  -Tumor is present in perimuscular fibrous tissue (AJCC 8th edition pT2.) The surgical margins are uninvolved. Perineural invasion identified. No vascular invasion identified.   -Cholelithiasis.      9/28/2023 Initial Diagnosis    Adenocarcinoma of gallbladder ([address])     10/13/2023 -  Cancer Staged    Staging form: Gallbladder, AJCC 8th Edition  - Clinical: cT2, cN0, cM0 - Signed by Shruti Sánchez MD on 10/13/2023  Total positive nodes: 0       10/19/2023 -  Cancer Staged    Staging form: Gallbladder, AJCC 8th Edition  - Pathologic stage from 10/19/2023: Stage IVB (pT2, pN0, cM1) - Signed by Shruti Sánchez MD on 10/19/2023  Stage prefix: Initial diagnosis  Total positive nodes: 0               History of Present Illness: Patient returns after her robotic liver biopsy. The suspicious masses were actually consistent with hemangiomas. She comes in now to discuss further therapy. She still has some right upper quadrant discomfort, but this started after the IR liver biopsy. Her appetite is fair. No nausea or vomiting. Review of Systems  Complete ROS Surg Onc:   Complete ROS Surg Onc:   Constitutional: The patient denies new or recent history of general fatigue, no recent weight loss, no change in appetite. Eyes: No complaints of visual problems, no scleral icterus. ENT: no complaints of ear pain, no hoarseness, no difficulty swallowing,  no tinnitus and no new masses in head, oral cavity, or neck. Cardiovascular: No complaints of chest pain, no palpitations, no ankle edema. Respiratory: No complaints of shortness of breath, no cough. Gastrointestinal: No complaints of jaundice, no bloody stools, no pale stools. Genitourinary: No complaints of dysuria, no hematuria, no nocturia, no frequent urination, no urethral discharge. Musculoskeletal: No complaints of weakness, paralysis, joint stiffness or arthralgias. Integumentary: No complaints of rash, no new lesions. Neurological: No complaints of convulsions, no seizures, no dizziness. Hematologic/Lymphatic: No complaints of easy bruising. Endocrine:  No hot or cold intolerance. No polydipsia, polyphagia, or polyuria. Allergy/immunology:  No environmental allergies. No food allergies. Not immunocompromised. Skin:  No pallor or rash.   No wound. Patient Active Problem List   Diagnosis   • Acute cholecystitis   • Near syncope   • Diabetes ([address])   • Hypertension   • Elevated TSH   • Hyperglycemia   • Lightheadedness   • Elevated d-dimer   • Acute blood loss anemia   • Adenocarcinoma of gallbladder (HCC)     Past Medical History:   Diagnosis Date   • Diabetes mellitus ([address])    • Hypertension      Past Surgical History:   Procedure Laterality Date   • CATARACT EXTRACTION     •  SECTION     • CHOLECYSTECTOMY LAPAROSCOPIC N/A 2023    Procedure: CHOLECYSTECTOMY LAPAROSCOPIC;  Surgeon: Sameera De La Torre DO;  Location: [facility];  Service: General   • IR BIOPSY LIVER MASS  10/24/2023     No family history on file. Social History     Socioeconomic History   • Marital status: /Civil Union     Spouse name: Not on file   • Number of children: Not on file   • Years of education: Not on file   • Highest education level: Not on file   Occupational History   • Not on file   Tobacco Use   • Smoking status: Never     Passive exposure: Never   • Smokeless tobacco: Never   Vaping Use   • Vaping status: Never Used   Substance and Sexual Activity   • Alcohol use: Never   • Drug use: Not Currently   • Sexual activity: Not on file   Other Topics Concern   • Not on file   Social History Narrative   • Not on file     Social Determinants of Health     Financial Resource Strain: Not on file   Food Insecurity: No Food Insecurity (2023)    Hunger Vital Sign    • Worried About Running Out of Food in the Last Year: Never true    • Ran Out of Food in the Last Year: Never true   Transportation Needs: No Transportation Needs (2023)    PRAPARE - Transportation    • Lack of Transportation (Medical): No    • Lack of Transportation (Non-Medical):  No   Physical Activity: Not on file   Stress: Not on file   Social Connections: Not on file   Intimate Partner Violence: Not on file   Housing Stability: Low Risk  (2023)    Housing Stability Vital Sign    • Unable to Pay for Housing in the Last Year: No    • Number of Places Lived in the Last Year: 2    • Unstable Housing in the Last Year: No       Current Outpatient Medications:   •  acetaminophen (TYLENOL) 325 mg tablet, Take 2 tablets (650 mg total) by mouth every 6 (six) hours as needed for mild pain, headaches or fever, Disp: , Rfl: 0  •  ALPRAZolam (XANAX) 1 mg tablet, Take 1 tablet (1 mg total) by mouth see administration instructions Take 1 tablet 30 minutes prior to MRI, take second tablet immediately before MRI if needed. , Disp: 2 tablet, Rfl: 0  •  amLODIPine (NORVASC) 5 mg tablet, Take 5 mg by mouth daily at bedtime, Disp: , Rfl:   •  aspirin (ECOTRIN LOW STRENGTH) 81 mg EC tablet, Take 81 mg by mouth daily, Disp: , Rfl:   •  famotidine (PEPCID) 20 mg tablet, Take 20 mg by mouth daily, Disp: , Rfl:   •  glipiZIDE (GLUCOTROL) 5 mg tablet, Take 10 mg by mouth 2 (two) times a day, Disp: , Rfl:   •  metoprolol tartrate (LOPRESSOR) 50 mg tablet, Take 50 mg by mouth daily, Disp: , Rfl:   •  sitaGLIPtin-metFORMIN (Janumet)  MG per tablet, Take 1 tablet by mouth 2 (two) times a day with meals, Disp: , Rfl:   •  temazepam (RESTORIL) 22.5 MG capsule, Take 30 mg by mouth daily at bedtime as needed for sleep, Disp: , Rfl:   •  naloxone (NARCAN) 4 mg/0.1 mL nasal spray, Administer 1 spray into a nostril. If no response after 2-3 minutes, give another dose in the other nostril using a new spray.  (Patient not taking: Reported on 12/15/2023), Disp: 1 each, Rfl: 1  •  oxyCODONE (Roxicodone) 5 immediate release tablet, Take 1 tablet (5 mg total) by mouth every 6 (six) hours as needed for moderate pain Max Daily Amount: 20 mg (Patient not taking: Reported on 12/15/2023), Disp: 10 tablet, Rfl: 0  Allergies   Allergen Reactions   • Cortisone Chest Pain   • Dye [Iodinated Contrast Media] Chest Pain   • Trulicity [Dulaglutide] Bradycardia     Vitals:    12/15/23 1547   BP: 138/80   Pulse: 70   Temp: (!) 97.4 °F (36.3 °C)   SpO2: 98%       Physical Exam  Constitutional: General appearance: The Patient is well-developed and well-nourished who appears the stated age in no acute distress. Patient is pleasant and talkative. HEENT:  Normocephalic. Sclerae are anicteric. Mucous membranes are moist. Neck is supple without adenopathy. No JVD. Chest: The lungs are clear to auscultation. Cardiac: Heart is regular rate. Abdomen: Abdomen is soft, tender in the right upper quadrant, non-distended and without masses. No guarding or peritoneal signs  Extremities: There is no clubbing or cyanosis. There is no edema. Symmetric. Neuro: Grossly nonfocal. Gait is normal.     Lymphatic: No evidence of cervical adenopathy bilaterally. No evidence of axillary adenopathy bilaterally. Skin: Warm, anicteric. Psych:  Patient is pleasant and talkative. Breasts:        Pathology:  Final Diagnosis   A. Liver (wedge resection)  - Hemangioma present at cauterized margin  - Cirrhosis  - Steatohepatitis     Comment:  - The hemangioma is highlighted by CD31. D2-40 is negative in the hemangioma. Trichrome and PAS stains confirm cirrhosis. PAS-D stain shows no PAS-D positive hyaline globules as would be seen in AAT deficiency. The iron stain show no significant iron deposition. The reticulin shows hepatic plates of 1-2 cells in thickness.   - The patient's history of gallbladder carcinoma is noted. Interpretation performed at [address] [place] [address]      Electronically signed by Lorena Liu MD on 11/21/2023 at  9:42 AM       Labs:      Imaging  No results found. I personally reviewed and interpreted the above laboratory and imaging data. Discussion/Summary: 53-year-old female with a clinical T2N0M0 gallbladder carcinoma. The liver lesions were suspicious by MRI, but on final surgical biopsy were benign hemangiomas.   At this time, I have recommended that she undergo segment 4B/5 liver resection with portal lymph node dissection. She is not inclined to have any further surgical intervention. I discussed that this would be the standard of care, and this should be followed by chemo and radiation. She does not wish to have surgery, I would recommend that she undergo chemoradiation. If she declines this option then she will just be observed. I did discuss that her initial tumor was on the peritoneal side of the gallbladder and there was perineural invasion. I did discuss that the perineural invasion is a high risk feature. We discussed that with T2 gallbladder carcinoma, there can be a 20% risk of recurrence in 5 years as well as a 30% risk of carlee metastasis. Consequently, I reiterated that my recommendation would be to undergo surgical intervention followed by chemoradiation. Since she really does not wish to do this, I will make sure she is set up with medical and radiation oncology. She will meet with them to discuss chemoradiation options. I will see her back after she has met with them to finalize treatment plans and surveillance. She and her family are agreeable to this. All of their questions were answered.

## 2024-01-17 ENCOUNTER — TELEPHONE (OUTPATIENT)
Dept: HEMATOLOGY ONCOLOGY | Facility: CLINIC | Age: 74
End: 2024-01-17

## 2024-01-17 DIAGNOSIS — C23 ADENOCARCINOMA OF GALLBLADDER (HCC): Primary | ICD-10-CM

## 2024-01-17 NOTE — TELEPHONE ENCOUNTER
Call Transfer   Who are you speaking with?  Child   If it is not the patient, are they listed on an active communication consent form? Yes   Who is the patients HemOnc/SurgOnc provider? Dr. Mckeon   What is the reason for this call? Need referral to rad/onc   Person/Department that the call was transferred to?    Time that call was transferred?   Leidy GOTTLIEB 1:15pm 1/17   Your call will be transferred now. If you receive a voicemail, please leave a detailed message and a member of the team will return your call as soon as possible.    Did you relay this information to the caller?  Yes

## 2024-01-17 NOTE — TELEPHONE ENCOUNTER
Appointment Schedule   Who are you speaking with? Child   If it is not the patient, are they listed on an active communication consent form? Yes   Which provider is the appointment scheduled with? Dr Baltazar   At which location is the appointment scheduled for? [facility]   When is the appointment scheduled?  Please list date and time 2/6 120 pm    What is the reason for this appointment? LUCY Dr. Frey  F/U   Did patient voice understanding of the details of this appointment? Yes   Was the no show policy reviewed with patient? Yes

## 2024-01-17 NOTE — TELEPHONE ENCOUNTER
Appointment Change  Cancel, Reschedule, Change to Virtual      Who are you speaking with? Child   If it is not the patient, is the caller listed on the communication consent form? Yes   Which provider is the appointment scheduled with? Dr. Mckeon   When was the original appointment scheduled?    Please list date and time 1/18 2:45pm   At which location is the appointment scheduled to take place? [facility] ([address]   Was the appointment rescheduled?     Was the appointment changed from an in person visit to a virtual visit?    If so, please list the details of the change. No, will call   What is the reason for the appointment change? Need referrals to rad/onc       Was STAR transport scheduled? No   Does STAR transport need to be scheduled for the new visit (if applicable) No   Does the patient need an infusion appointment rescheduled? No   Does the patient have an upcoming infusion appointment scheduled? If so, when? No   Is the patient undergoing chemotherapy? No   For appointments cancelled with less than 24 hours:  Was the no-show policy reviewed? Yes

## 2024-02-01 ENCOUNTER — TELEPHONE (OUTPATIENT)
Dept: HEMATOLOGY ONCOLOGY | Facility: CLINIC | Age: 74
End: 2024-02-01

## 2024-02-01 NOTE — TELEPHONE ENCOUNTER
Patient Call    Who are you speaking with? Ben Balta   If it is not the patient, are they listed on an active communication consent form? Yes   What is the reason for this call? Balta calling in regards to patient's appointment with Dr Baltazar on 2/6/24.  Balta states that patient is currently in [place] and will return on Monday.  Balta states patient will have labs done on Monday 2/5/24 so labs will be done for patient's appointment.  Balta would like to confirm that patient can keep her appointment on 2/6/24.  Balta would like to speak to Karlene in regards to patient's appointment and lab work.   Does this require a call back? Yes   If a call back is required, please list [facility] call back number [phone]   If a call back is required, advise that a message will be forwarded to their care team and someone will return their call as soon as possible.   Did you relay this information to the patient? Yes

## 2024-02-01 NOTE — TELEPHONE ENCOUNTER
Voicemail left for and[place]s [place] son  Advised patient needs to have bloodwork completed no later than Saturday for follow up on 2/6 with Dr. Baltazar  Instructed to return call and reschedule if unable to do so

## 2024-02-02 ENCOUNTER — TELEPHONE (OUTPATIENT)
Dept: HEMATOLOGY ONCOLOGY | Facility: CLINIC | Age: 74
End: 2024-02-02

## 2024-02-02 NOTE — TELEPHONE ENCOUNTER
Voicemail left for Balta stating all labs will not be completed in time for appointment following day.  Advised if he would like to reschedule he may return call to hopeline to do so.

## 2024-02-02 NOTE — TELEPHONE ENCOUNTER
Appointment Confirmation   Who are you speaking with? Child   If it is not the patient, are they listed on an active communication consent form? Yes   Which provider is the appointment scheduled with?  Dr Baltazar   When is the appointment scheduled?  Please list date and time 2/6 1:20pm   At which location is the appointment scheduled to take place? [facility]   Did caller verbalize understanding of appointment details? Yes, will get labs done tomorrow 2/3

## 2024-02-05 ENCOUNTER — APPOINTMENT (OUTPATIENT)
Dept: LAB | Facility: HOSPITAL | Age: 74
End: 2024-02-05
Payer: COMMERCIAL

## 2024-02-05 DIAGNOSIS — C23 ADENOCARCINOMA OF GALLBLADDER (HCC): ICD-10-CM

## 2024-02-05 DIAGNOSIS — D64.9 ANEMIA, UNSPECIFIED TYPE: ICD-10-CM

## 2024-02-05 LAB
ALBUMIN SERPL BCP-MCNC: 3.8 G/DL (ref 3.5–5)
ALP SERPL-CCNC: 59 U/L (ref 34–104)
ALT SERPL W P-5'-P-CCNC: 27 U/L (ref 7–52)
ANION GAP SERPL CALCULATED.3IONS-SCNC: 5 MMOL/L
AST SERPL W P-5'-P-CCNC: 18 U/L (ref 13–39)
BASOPHILS # BLD AUTO: 0.03 THOUSANDS/ÂΜL (ref 0–0.1)
BASOPHILS NFR BLD AUTO: 0 % (ref 0–1)
BILIRUB SERPL-MCNC: 0.48 MG/DL (ref 0.2–1)
BUN SERPL-MCNC: 21 MG/DL (ref 5–25)
CALCIUM SERPL-MCNC: 9.1 MG/DL (ref 8.4–10.2)
CEA SERPL-MCNC: 2.5 NG/ML (ref 0–3)
CHLORIDE SERPL-SCNC: 110 MMOL/L (ref 96–108)
CO2 SERPL-SCNC: 27 MMOL/L (ref 21–32)
CREAT SERPL-MCNC: 0.67 MG/DL (ref 0.6–1.3)
CRP SERPL QL: 3.8 MG/L
EOSINOPHIL # BLD AUTO: 0.22 THOUSAND/ÂΜL (ref 0–0.61)
EOSINOPHIL NFR BLD AUTO: 3 % (ref 0–6)
ERYTHROCYTE [DISTWIDTH] IN BLOOD BY AUTOMATED COUNT: 13.8 % (ref 11.6–15.1)
ERYTHROCYTE [SEDIMENTATION RATE] IN BLOOD: 27 MM/HOUR (ref 0–29)
FERRITIN SERPL-MCNC: 31 NG/ML (ref 11–307)
GFR SERPL CREATININE-BSD FRML MDRD: 86 ML/MIN/1.73SQ M
GLUCOSE P FAST SERPL-MCNC: 146 MG/DL (ref 65–99)
HCT VFR BLD AUTO: 35.4 % (ref 34.8–46.1)
HGB BLD-MCNC: 11.6 G/DL (ref 11.5–15.4)
IMM GRANULOCYTES # BLD AUTO: 0.1 THOUSAND/UL (ref 0–0.2)
IMM GRANULOCYTES NFR BLD AUTO: 1 % (ref 0–2)
IRON SATN MFR SERPL: 27 % (ref 15–50)
IRON SERPL-MCNC: 77 UG/DL (ref 50–212)
LDH SERPL-CCNC: 121 U/L (ref 140–271)
LYMPHOCYTES # BLD AUTO: 2.29 THOUSANDS/ÂΜL (ref 0.6–4.47)
LYMPHOCYTES NFR BLD AUTO: 31 % (ref 14–44)
MAGNESIUM SERPL-MCNC: 1.7 MG/DL (ref 1.9–2.7)
MCH RBC QN AUTO: 29.5 PG (ref 26.8–34.3)
MCHC RBC AUTO-ENTMCNC: 32.8 G/DL (ref 31.4–37.4)
MCV RBC AUTO: 90 FL (ref 82–98)
MONOCYTES # BLD AUTO: 0.59 THOUSAND/ÂΜL (ref 0.17–1.22)
MONOCYTES NFR BLD AUTO: 8 % (ref 4–12)
NEUTROPHILS # BLD AUTO: 4.23 THOUSANDS/ÂΜL (ref 1.85–7.62)
NEUTS SEG NFR BLD AUTO: 57 % (ref 43–75)
NRBC BLD AUTO-RTO: 0 /100 WBCS
PLATELET # BLD AUTO: 244 THOUSANDS/UL (ref 149–390)
PMV BLD AUTO: 10.1 FL (ref 8.9–12.7)
POTASSIUM SERPL-SCNC: 3.9 MMOL/L (ref 3.5–5.3)
PROT SERPL-MCNC: 6.7 G/DL (ref 6.4–8.4)
RBC # BLD AUTO: 3.93 MILLION/UL (ref 3.81–5.12)
SODIUM SERPL-SCNC: 142 MMOL/L (ref 135–147)
TIBC SERPL-MCNC: 282 UG/DL (ref 250–450)
UIBC SERPL-MCNC: 205 UG/DL (ref 155–355)
VIT B12 SERPL-MCNC: 165 PG/ML (ref 180–914)
WBC # BLD AUTO: 7.46 THOUSAND/UL (ref 4.31–10.16)

## 2024-02-05 PROCEDURE — 82607 VITAMIN B-12: CPT

## 2024-02-05 PROCEDURE — 82378 CARCINOEMBRYONIC ANTIGEN: CPT

## 2024-02-05 PROCEDURE — 83540 ASSAY OF IRON: CPT

## 2024-02-05 PROCEDURE — 85025 COMPLETE CBC W/AUTO DIFF WBC: CPT

## 2024-02-05 PROCEDURE — 85652 RBC SED RATE AUTOMATED: CPT

## 2024-02-05 PROCEDURE — 83550 IRON BINDING TEST: CPT

## 2024-02-05 PROCEDURE — 36415 COLL VENOUS BLD VENIPUNCTURE: CPT

## 2024-02-05 PROCEDURE — 86140 C-REACTIVE PROTEIN: CPT

## 2024-02-05 PROCEDURE — 83615 LACTATE (LD) (LDH) ENZYME: CPT

## 2024-02-05 PROCEDURE — 86301 IMMUNOASSAY TUMOR CA 19-9: CPT

## 2024-02-05 PROCEDURE — 80053 COMPREHEN METABOLIC PANEL: CPT

## 2024-02-05 PROCEDURE — 83735 ASSAY OF MAGNESIUM: CPT

## 2024-02-05 PROCEDURE — 82728 ASSAY OF FERRITIN: CPT

## 2024-02-06 ENCOUNTER — OFFICE VISIT (OUTPATIENT)
Dept: HEMATOLOGY ONCOLOGY | Facility: CLINIC | Age: 74
End: 2024-02-06
Payer: COMMERCIAL

## 2024-02-06 VITALS
TEMPERATURE: 98.1 F | WEIGHT: 145 LBS | RESPIRATION RATE: 16 BRPM | DIASTOLIC BLOOD PRESSURE: 70 MMHG | SYSTOLIC BLOOD PRESSURE: 120 MMHG | BODY MASS INDEX: 26.68 KG/M2 | OXYGEN SATURATION: 98 % | HEIGHT: 62 IN | HEART RATE: 80 BPM

## 2024-02-06 DIAGNOSIS — C23 ADENOCARCINOMA OF GALLBLADDER (HCC): Primary | ICD-10-CM

## 2024-02-06 LAB — CANCER AG19-9 SERPL-ACNC: 30 U/ML (ref 0–35)

## 2024-02-06 PROCEDURE — 99214 OFFICE O/P EST MOD 30 MIN: CPT | Performed by: INTERNAL MEDICINE

## 2024-02-06 NOTE — PROGRESS NOTES
Hematology/Oncology Outpatient Follow- up Note  Gladis Villalobos 73 y.o. 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  I also explained that during radiation will be given at a lower dose daily basis.  She will see radiation therapy on 2/8/2024.  She will discuss that with them and then we will try and get back to them on 2/9/2024.  Will try and finalize what they wish to do.  HPI: 73-year-old female with a history of a adenocarcinoma of the gallbladder.  It was T2 N0 M0.  She had what appeared to be possible liver metastasis.  These were biopsied and proved to be hemangiomas.  She was offered repeat surgery followed by chemo or chemoradiation.  She refused.  She is here for consideration of chemoradiation.  We would offer her Xeloda with radiation and then try and consider continues alone after complete 6 cycles of treatment.  This the biggest study on adjuvant before gallbladder is the Bilcap Study using Xeloda for 6 months.  Multiple other studies have used chemoradiation and usually continuing some sort of adjuvant chemotherapy afterwards most these are based on 5-FU although some of use Gemzar or Gemzar with cisplatin.  I explained to the patient and her family that it may reduce relapse rates by 15 to 20%.  A stage T2 would probably have 5-year survival somewhere around 50 to 60%.  That would be without treatment.  I would not force her to treatment as she seems quite reluctant.      Interval History:  Note from Dr. Mckeon 12/15/2023 surgical oncology   73-year-old female with a clinical T2N0M0 gallbladder carcinoma.  The liver lesions were suspicious by MRI, but on final surgical biopsy were benign hemangiomas.  At this time, I have recommended that she  undergo segment 4B/5 liver resection with portal lymph node dissection.  She is not inclined to have any further surgical intervention.  I discussed that this would be the standard of care, and this should be followed by chemo and radiation.  She does not wish to have surgery, I would recommend that she undergo chemoradiation.  If she declines this option then she will just be observed.  I did discuss that her initial tumor was on the peritoneal side of the gallbladder and there was perineural invasion.  I did discuss that the perineural invasion is a high risk feature.  We discussed that with T2 gallbladder carcinoma, there can be a 20% risk of recurrence in 5 years as well as a 30% risk of carlee metastasis.  Consequently, I reiterated that my recommendation would be to undergo surgical intervention followed by chemoradiation.  Since she really does not wish to do this, I will make sure she is set up with medical and radiation oncology.  She will meet with them to discuss chemoradiation options.  I will see her back after she has met with them to finalize treatment plans and surveillance.  She and her family are agreeable to this.  All of their questions were answered.   Cancer Staging:  Cancer Staging   Adenocarcinoma of gallbladder (HCC)  Staging form: Gallbladder, AJCC 8th Edition  - Clinical: cT2, cN0, cM0 - Signed by Duncan Mckeon MD on 10/13/2023  Total positive nodes: 0  - Pathologic stage from 10/19/2023: Stage IVB (pT2, pN0, cM1) - Signed by Duncan Mckeon MD on 10/19/2023  Stage prefix: Initial diagnosis  Total positive nodes: 0      Molecular Testing:     Previous Hematologic/ Oncologic History:    Oncology History Overview Note   with a clinical T2N0M0 gallbladder carcinoma.  She presented to the ED with right upper quadrant abdominal pain and underwent cholecystectomy on 9/2/24 for acute cholecystitis. She had IR liver biopsy followed by robotic liver biopsy for suspicious  liver lesions identified on  MRI which were found to be benign hemangiomas on surgical pathology. She is being referred by Dr. Mckeon and presents today for consult.      8/30/23 Hospital admission  Patient with right upper quadrant abdominal pain  Right upper quadrant ultrasound showing acute cholecystitis  9/2 cholecystectomy  Completed a course of antibiotics  Surgery cleared the patient for discharge    8/30/23 CT C/A/P wo contrast  No acute findings in the chest, abdomen or the pelvis.       9/1/23 US right upper quadrant  Abnormal gallbladder, with calculi, sludge, gallbladder wall thickening, positive Rivera sign, and small amount of pericholecystic fluid. Findings highly suspicious for acute cholecystitis in the appropriate clinical setting       9/2/23 CHOLECYSTECTOMY LAPAROSCOPIC (Abdomen)   Procedure  Simple cholecystectomy   TUMOR   Tumor Site  Cannot be determined   Histologic Type  Adenocarcinoma, not otherwise specified (NOS)   Histologic Grade  G2, moderately differentiated   Tumor Size  Cannot be determined:  microscopic foci of tumor     Tumor Extent  Invades perimuscular connective tissue on the peritoneal side without serosal involvement   Lymphovascular Invasion  Not identified   Perineural Invasion  Present   MARGINS   Margin Status for Invasive Carcinoma  All margins negative for invasive carcinoma   Distance from Invasive Carcinoma to Cystic Duct Margin    more than 5 mm mm   Margin Status for Intraepithelial Neoplasia  All margins negative for high-grade intraepithelial neoplasia   REGIONAL LYMPH NODES   Regional Lymph Node Status  Not applicable (no regional lymph nodes submitted or found)   PATHOLOGIC STAGE CLASSIFICATION (pTNM, AJCC 8th Edition)   Reporting of pT, pN, and (when applicable) pM categories is based on information available to the pathologist at the time the report is issued. As per the AJCC (Chapter 1, 8th Ed.) it is the managing physician’s responsibility to establish the final pathologic stage based  upon all pertinent information, including but potentially not limited to this pathology report.   pT Category  pT2a   pN Category  pN not assigned (no nodes submitted or found)   ADDITIONAL FINDINGS   Additional Findings  Cholelithiasis     Chronic cholecystitis         9/28/23 Dr. Ha  Laparoscopic incisions healing well without erythema induration or drainage  -Recommend no heavy lifting greater than 15 to 20 pounds for total of 4 weeks after surgery  -Follow-up in office as needed  -Pathology report was discussed at length with both the patient and her son  -Due to tumor being pT2 referral placed to both Surgical oncology and Medical oncology for further evaluation and management      10/13/23 Dr. Mckeon   no evidence of distant metastasis on her chest CT.   recommended obtaining an MRI of the liver to make sure there is no significant intrahepatic tumor burden.   will see her back once we have the results of the MRI to finalize her treatment plans     10/13/23 Dr. Frey  did explain to the patient and her son/daughter-in-law in the potential benefit of adjuvant treatment extensively.    will wait until the final staging/planned surgery is completed then evaluated the patient again in the oncology clinic to discuss the final treatment plan.       10/16/23 MRI abdomen wo contrast  Interval cholecystectomy. Multiple hepatic mass lesions suspicious for metastases.       10/19/23 Dr. Mckeon  MRI is certainly concerning of liver metastasis   recommend that she undergo liver biopsy   discussed that if this is malignant, the mainstay of treatment would be chemotherapy.  If this is benign, we would proceed with either laparoscopic liver biopsy to ensure there was no sampling error or proceed with segment 4B/5 liver resection with portal lymph node dissection.       10/24/23 IR liver biopsy  A. Liver, mass, needle biopsy:  - Steatohepatitis.  - Bridging fibrosis with focal nodular formation (stage 3-4 of 4).  - Negative  for malignancy.      10/31/23 Dr. Mckeon  pathology is not concordant with the imaging and the pathology does not explain a mass lesion.   discussed with interventional radiology, and the biopsy was difficult and they do not think a repeat biopsy would be beneficial by IR.      recommended that she undergo robotic liver biopsy, possible open liver biopsy.         11/15/23 LIVER BIOPSY ROBOTIC (Abdomen)   A. Liver (wedge resection)  - Hemangioma present at cauterized margin  - Cirrhosis  - Steatohepatitis      12/15/23 Dr. Mckeon   final surgical biopsy were benign hemangiomas.    recommended that she undergo segment 4B/5 liver resection with portal lymph node dissection. and this should be followed by chemo and radiation.    She is not inclined to have any further surgical intervention.   She does not wish to have surgery, I would recommend that she undergo chemoradiation.   If she declines this option then she will just be observed.      did discuss that the perineural invasion is a high risk feature.   Refer to Med Onc and Rad Onc      2/6/24 Dr. Baltazar             Adenocarcinoma of gallbladder (HCC)   9/2/2023 Surgery    Laparoscopic cholecystectomy:  Gallbladder, Gallbladder and contents , cholecystectomy:  -Moderately differentiated adenocarcinoma arising in a background of erosive chronic cholecystitis with high grade surface dysplasia and dense serosal adhesions.  -Tumor is present in perimuscular fibrous tissue (AJCC 8th edition pT2.) The surgical margins are uninvolved. Perineural invasion identified. No vascular invasion identified.   -Cholelithiasis.     9/28/2023 Initial Diagnosis    Adenocarcinoma of gallbladder (HCC)     10/13/2023 -  Cancer Staged    Staging form: Gallbladder, AJCC 8th Edition  - Clinical: cT2, cN0, cM0 - Signed by Duncan Mckeon MD on 10/13/2023  Total positive nodes: 0       10/19/2023 -  Cancer Staged    Staging form: Gallbladder, AJCC 8th Edition  - Pathologic stage from 10/19/2023:  "Stage IVB (pT2, pN0, cM1) - Signed by Duncan Mckeon MD on 10/19/2023  Stage prefix: Initial diagnosis  Total positive nodes: 0           Current Hematologic/ Oncologic Treatment:       Cycle 1         Test Results:    Imaging: No results found.          Labs:   Lab Results   Component Value Date    WBC 7.46 02/05/2024    HGB 11.6 02/05/2024    HCT 35.4 02/05/2024    MCV 90 02/05/2024     02/05/2024     Lab Results   Component Value Date    K 3.9 02/05/2024     (H) 02/05/2024    CO2 27 02/05/2024    BUN 21 02/05/2024    CREATININE 0.67 02/05/2024    GLUF 146 (H) 02/05/2024    CALCIUM 9.1 02/05/2024    CORRECTEDCA 8.5 09/02/2023    AST 18 02/05/2024    ALT 27 02/05/2024    ALKPHOS 59 02/05/2024    EGFR 86 02/05/2024         No results found for: \"SPEP\", \"UPEP\"    No results found for: \"PSA\"    Lab Results   Component Value Date    CEA 2.5 02/05/2024       No results found for: \"\"    No results found for: \"AFP\"    Lab Results   Component Value Date    IRON 77 02/05/2024    TIBC 282 02/05/2024    FERRITIN 31 02/05/2024       Lab Results   Component Value Date    OGUVSYFE25 165 (L) 02/05/2024         ROS: Review of Systems   Constitutional: Negative.    HENT: Negative.     Eyes: Negative.    Respiratory: Negative.     Cardiovascular: Negative.    Gastrointestinal: Negative.    Endocrine: Negative.    Genitourinary: Negative.    Musculoskeletal: Negative.    Skin: Negative.    Allergic/Immunologic: Negative.    Neurological: Negative.    Hematological: Negative.          Current Medications: Reviewed  Allergies: Reviewed  PMH/FH/SH:  Reviewed      Physical Exam:    Body surface area is 1.67 meters squared.    Wt Readings from Last 3 Encounters:   02/06/24 65.8 kg (145 lb)   12/15/23 66.7 kg (147 lb)   11/15/23 63.5 kg (140 lb)        Temp Readings from Last 3 Encounters:   02/06/24 98.1 °F (36.7 °C) (Temporal)   12/15/23 (!) 97.4 °F (36.3 °C) (Temporal)   11/15/23 97.5 °F (36.4 °C) (Temporal)        BP " Readings from Last 3 Encounters:   02/06/24 120/70   12/15/23 138/80   11/15/23 148/81         Pulse Readings from Last 3 Encounters:   02/06/24 80   12/15/23 70   11/15/23 71     @LASTSAO2(3)@      Physical Exam  Constitutional:       Appearance: Normal appearance. She is normal weight.   HENT:      Head: Normocephalic and atraumatic.   Eyes:      Extraocular Movements: Extraocular movements intact.      Conjunctiva/sclera: Conjunctivae normal.      Pupils: Pupils are equal, round, and reactive to light.   Cardiovascular:      Rate and Rhythm: Normal rate and regular rhythm.      Heart sounds: Normal heart sounds.   Pulmonary:      Effort: Pulmonary effort is normal.      Breath sounds: Normal breath sounds.   Abdominal:      General: Abdomen is flat. Bowel sounds are normal.      Palpations: Abdomen is soft.   Musculoskeletal:         General: Normal range of motion.      Cervical back: Normal range of motion and neck supple.   Skin:     General: Skin is warm and dry.   Neurological:      General: No focal deficit present.      Mental Status: She is alert and oriented to person, place, and time. Mental status is at baseline.     She appears alert she is oriented ECOG performance status presumed 1.      Goals and Barriers:  Current Goal: Prolong Survival from Cancer.   Barriers: None.      Patient's Capacity to Self Care:  Patient is able to self care.    Code Status: [unfilled]  Advance Directive and Living Will:      Power of :

## 2024-02-07 ENCOUNTER — TELEPHONE (OUTPATIENT)
Dept: HEMATOLOGY ONCOLOGY | Facility: CLINIC | Age: 74
End: 2024-02-07

## 2024-02-07 DIAGNOSIS — E53.8 VITAMIN B 12 DEFICIENCY: Primary | ICD-10-CM

## 2024-02-07 DIAGNOSIS — D50.9 IRON DEFICIENCY ANEMIA, UNSPECIFIED IRON DEFICIENCY ANEMIA TYPE: ICD-10-CM

## 2024-02-07 RX ORDER — CYANOCOBALAMIN 1000 UG/ML
1000 INJECTION, SOLUTION INTRAMUSCULAR; SUBCUTANEOUS ONCE
OUTPATIENT
Start: 2024-04-03

## 2024-02-07 RX ORDER — SODIUM CHLORIDE 9 MG/ML
20 INJECTION, SOLUTION INTRAVENOUS ONCE
OUTPATIENT
Start: 2024-02-14

## 2024-02-07 RX ORDER — CYANOCOBALAMIN 1000 UG/ML
1000 INJECTION, SOLUTION INTRAMUSCULAR; SUBCUTANEOUS ONCE
OUTPATIENT
Start: 2024-02-14 | End: 2024-02-14

## 2024-02-07 NOTE — TELEPHONE ENCOUNTER
Phoned pt and left voice message for her son Balta. Pt needs weekly Venofer X 3 with weekly Vit B 12 X 4 and then Vit B 12 once a month after the four weekly doses. Pt lives in [place] so most likely [facility] is closest. Pt will need to have a F/u with Dr Frey in 3 mos with labs drawn the week before appt

## 2024-02-08 ENCOUNTER — RADIATION ONCOLOGY CONSULT (OUTPATIENT)
Dept: RADIATION ONCOLOGY | Facility: CLINIC | Age: 74
End: 2024-02-08
Attending: STUDENT IN AN ORGANIZED HEALTH CARE EDUCATION/TRAINING PROGRAM
Payer: COMMERCIAL

## 2024-02-08 VITALS
BODY MASS INDEX: 27.25 KG/M2 | OXYGEN SATURATION: 98 % | RESPIRATION RATE: 16 BRPM | DIASTOLIC BLOOD PRESSURE: 78 MMHG | SYSTOLIC BLOOD PRESSURE: 140 MMHG | TEMPERATURE: 97.1 F | HEART RATE: 60 BPM | WEIGHT: 149 LBS

## 2024-02-08 DIAGNOSIS — C23 ADENOCARCINOMA OF GALLBLADDER (HCC): ICD-10-CM

## 2024-02-08 PROCEDURE — 99204 OFFICE O/P NEW MOD 45 MIN: CPT | Performed by: STUDENT IN AN ORGANIZED HEALTH CARE EDUCATION/TRAINING PROGRAM

## 2024-02-08 PROCEDURE — 99211 OFF/OP EST MAY X REQ PHY/QHP: CPT | Performed by: STUDENT IN AN ORGANIZED HEALTH CARE EDUCATION/TRAINING PROGRAM

## 2024-02-08 NOTE — PROGRESS NOTES
Gladis Villalobos 1950 is a 73 y.o. female with a clinical T2N0M0 gallbladder carcinoma.  She presented to the ED with right upper quadrant abdominal pain and underwent cholecystectomy on 9/2/24 for acute cholecystitis. She had IR liver biopsy followed by robotic liver biopsy for suspicious  liver lesions identified on MRI which were found to be benign hemangiomas on surgical pathology. She is being referred by Dr. Mckeon and presents today for consult.      8/30/23 Hospital admission  Patient with right upper quadrant abdominal pain  Right upper quadrant ultrasound showing acute cholecystitis  9/2 cholecystectomy  Completed a course of antibiotics  Surgery cleared the patient for discharge    8/30/23 CT C/A/P wo contrast  No acute findings in the chest, abdomen or the pelvis.       9/1/23 US right upper quadrant  Abnormal gallbladder, with calculi, sludge, gallbladder wall thickening, positive Rivera sign, and small amount of pericholecystic fluid. Findings highly suspicious for acute cholecystitis in the appropriate clinical setting       9/2/23 CHOLECYSTECTOMY LAPAROSCOPIC (Abdomen)   Procedure  Simple cholecystectomy   TUMOR   Tumor Site  Cannot be determined   Histologic Type  Adenocarcinoma, not otherwise specified (NOS)   Histologic Grade  G2, moderately differentiated   Tumor Size  Cannot be determined:  microscopic foci of tumor     Tumor Extent  Invades perimuscular connective tissue on the peritoneal side without serosal involvement   Lymphovascular Invasion  Not identified   Perineural Invasion  Present   MARGINS   Margin Status for Invasive Carcinoma  All margins negative for invasive carcinoma   Distance from Invasive Carcinoma to Cystic Duct Margin    more than 5 mm mm   Margin Status for Intraepithelial Neoplasia  All margins negative for high-grade intraepithelial neoplasia   REGIONAL LYMPH NODES   Regional Lymph Node Status  Not applicable (no regional lymph nodes submitted or found)    PATHOLOGIC STAGE CLASSIFICATION (pTNM, AJCC 8th Edition)   Reporting of pT, pN, and (when applicable) pM categories is based on information available to the pathologist at the time the report is issued. As per the AJCC (Chapter 1, 8th Ed.) it is the managing physician’s responsibility to establish the final pathologic stage based upon all pertinent information, including but potentially not limited to this pathology report.   pT Category  pT2a   pN Category  pN not assigned (no nodes submitted or found)   ADDITIONAL FINDINGS   Additional Findings  Cholelithiasis     Chronic cholecystitis         9/28/23 Dr. Ha  Laparoscopic incisions healing well without erythema induration or drainage  -Recommend no heavy lifting greater than 15 to 20 pounds for total of 4 weeks after surgery  -Follow-up in office as needed  -Pathology report was discussed at length with both the patient and her son  -Due to tumor being pT2 referral placed to both Surgical oncology and Medical oncology for further evaluation and management      10/13/23 Dr. Mckeon   no evidence of distant metastasis on her chest CT.   recommended obtaining an MRI of the liver to make sure there is no significant intrahepatic tumor burden.   will see her back once we have the results of the MRI to finalize her treatment plans     10/13/23 Dr. Frey  did explain to the patient and her son/daughter-in-law in the potential benefit of adjuvant treatment extensively.    will wait until the final staging/planned surgery is completed then evaluated the patient again in the oncology clinic to discuss the final treatment plan.       10/16/23 MRI abdomen wo contrast  Interval cholecystectomy. Multiple hepatic mass lesions suspicious for metastases.       10/19/23 Dr. Mckeon  MRI is certainly concerning of liver metastasis   recommend that she undergo liver biopsy   discussed that if this is malignant, the mainstay of treatment would be chemotherapy.  If this is benign, we  would proceed with either laparoscopic liver biopsy to ensure there was no sampling error or proceed with segment 4B/5 liver resection with portal lymph node dissection.       10/24/23 IR liver biopsy  A. Liver, mass, needle biopsy:  - Steatohepatitis.  - Bridging fibrosis with focal nodular formation (stage 3-4 of 4).  - Negative for malignancy.      10/31/23 Dr. Mckeon  pathology is not concordant with the imaging and the pathology does not explain a mass lesion.   discussed with interventional radiology, and the biopsy was difficult and they do not think a repeat biopsy would be beneficial by IR.      recommended that she undergo robotic liver biopsy, possible open liver biopsy.         11/15/23 LIVER BIOPSY ROBOTIC (Abdomen)   A. Liver (wedge resection)  - Hemangioma present at cauterized margin  - Cirrhosis  - Steatohepatitis      12/15/23 Dr. Mckeon   final surgical biopsy were benign hemangiomas.    recommended that she undergo segment 4B/5 liver resection with portal lymph node dissection. and this should be followed by chemo and radiation.    She is not inclined to have any further surgical intervention.   She does not wish to have surgery, I would recommend that she undergo chemoradiation.   If she declines this option then she will just be observed.      did discuss that the perineural invasion is a high risk feature.   Refer to Med Onc and Rad Onc      2/6/24 Dr. Baltazar  1 gallbladder carcinoma status post resection T2 N0 M0  2 status post biopsy of multiple hemangiomas of the liver  3 diabetes mellitus type 2  Plan: We gave a full description of Xeloda 2 weeks on 1 week off.   explained that during radiation will be given at a lower dose daily basis.           Oncology History   Adenocarcinoma of gallbladder (HCC)   9/2/2023 Surgery    Laparoscopic cholecystectomy:  Gallbladder, Gallbladder and contents , cholecystectomy:  -Moderately differentiated adenocarcinoma arising in a background of erosive chronic  cholecystitis with high grade surface dysplasia and dense serosal adhesions.  -Tumor is present in perimuscular fibrous tissue (AJCC 8th edition pT2.) The surgical margins are uninvolved. Perineural invasion identified. No vascular invasion identified.   -Cholelithiasis.     9/28/2023 Initial Diagnosis    Adenocarcinoma of gallbladder (HCC)     10/13/2023 -  Cancer Staged    Staging form: Gallbladder, AJCC 8th Edition  - Clinical: cT2, cN0, cM0 - Signed by Duncan Mckeon MD on 10/13/2023  Total positive nodes: 0       10/19/2023 -  Cancer Staged    Staging form: Gallbladder, AJCC 8th Edition  - Pathologic stage from 10/19/2023: Stage IVB (pT2, pN0, cM1) - Signed by Duncan Mckeon MD on 10/19/2023  Stage prefix: Initial diagnosis  Total positive nodes: 0           Review of Systems:  Review of Systems   Constitutional:  Positive for appetite change and fatigue.   HENT:  Positive for dental problem (full dentures).    Eyes:         Wears glasses   Respiratory:  Positive for shortness of breath (sometimes).    Cardiovascular:  Positive for palpitations and leg swelling (B/L lower legs).   Gastrointestinal:  Positive for abdominal pain (sometimes) and diarrhea (sometimes). Negative for abdominal distention, constipation and nausea.   Genitourinary: Negative.    Musculoskeletal:  Positive for back pain.   Skin: Negative.    Allergic/Immunologic: Negative.    Neurological:  Positive for dizziness, light-headedness, numbness (B/L feet) and headaches (daily).   Hematological: Negative.    Psychiatric/Behavioral:  Positive for sleep disturbance. Negative for suicidal ideas. The patient is nervous/anxious.         Feels depressed       Clinical Trial: no    Pregnancy test needed:  no    ONCOTYPE/MAMMOPRINT results: N?A    PFT: N/A    Prior Radiation: no    Teaching: NIH book, side effects, SIM    MST:completed    Implantable Devices (Port, Pacemaker, pain stimulator): no    Hip Replacement: no      [unfilled]  [facility]  Maintenance   Topic Date Due    Hepatitis C Screening  Never done    Medicare Annual Wellness Visit (AWV)  Never done    Kidney Health Evaluation: Albumin/Creatinine Ratio  Never done    COVID-19 Vaccine (1) Never done    Pneumococcal Vaccine: 65+ Years (1 - PCV) Never done    Diabetic Foot Exam  Never done    DM Eye Exam  Never done    Depression Screening  Never done    BMI: Followup Plan  Never done    Hepatitis A Vaccine (1 of 2 - Risk 2-dose series) Never done    Colorectal Cancer Screening  Never done    Osteoporosis Screening  Never done    Zoster Vaccine (1 of 2) Never done    Hepatitis B Vaccine (1 of 3 - Risk 3-dose series) Never done    Fall Risk  Never done    Urinary Incontinence Screening  Never done    Influenza Vaccine (1) Never done    HEMOGLOBIN A1C  2024    Kidney Health Evaluation: GFR  2025    BMI: Adult  2025    HIB Vaccine  Aged Out    IPV Vaccine  Aged Out    Meningococcal ACWY Vaccine  Aged Out    HPV Vaccine  Aged Out     Past Medical History:   Diagnosis Date    Diabetes mellitus (HCC)     Hypertension      Past Surgical History:   Procedure Laterality Date    CATARACT EXTRACTION       SECTION      CHOLECYSTECTOMY LAPAROSCOPIC N/A 2023    Procedure: CHOLECYSTECTOMY LAPAROSCOPIC;  Surgeon: Noe Ha DO;  Location: [facility] OR;  Service: General    IR BIOPSY LIVER MASS  10/24/2023     No family history on file.  Social History     Tobacco Use    Smoking status: Never     Passive exposure: Never    Smokeless tobacco: Never   Vaping Use    Vaping status: Never Used   Substance Use Topics    Alcohol use: Never    Drug use: Not Currently        Current Outpatient Medications:     acetaminophen (TYLENOL) 325 mg tablet, Take 2 tablets (650 mg total) by mouth every 6 (six) hours as needed for mild pain, headaches or fever, Disp: , Rfl: 0    ALPRAZolam (XANAX) 1 mg tablet, Take 1 tablet (1 mg total) by mouth see administration instructions Take 1 tablet 30  minutes prior to MRI, take second tablet immediately before MRI if needed., Disp: 2 tablet, Rfl: 0    amLODIPine (NORVASC) 5 mg tablet, Take 5 mg by mouth daily at bedtime, Disp: , Rfl:     aspirin (ECOTRIN LOW STRENGTH) 81 mg EC tablet, Take 81 mg by mouth daily, Disp: , Rfl:     famotidine (PEPCID) 20 mg tablet, Take 20 mg by mouth daily, Disp: , Rfl:     glipiZIDE (GLUCOTROL) 5 mg tablet, Take 10 mg by mouth 2 (two) times a day, Disp: , Rfl:     metoprolol tartrate (LOPRESSOR) 50 mg tablet, Take 50 mg by mouth daily, Disp: , Rfl:     naloxone (NARCAN) 4 mg/0.1 mL nasal spray, Administer 1 spray into a nostril. If no response after 2-3 minutes, give another dose in the other nostril using a new spray. (Patient not taking: Reported on 12/15/2023), Disp: 1 each, Rfl: 1    oxyCODONE (Roxicodone) 5 immediate release tablet, Take 1 tablet (5 mg total) by mouth every 6 (six) hours as needed for moderate pain Max Daily Amount: 20 mg (Patient not taking: Reported on 12/15/2023), Disp: 10 tablet, Rfl: 0    sitaGLIPtin-metFORMIN (Janumet)  MG per tablet, Take 1 tablet by mouth 2 (two) times a day with meals, Disp: , Rfl:     temazepam (RESTORIL) 22.5 MG capsule, Take 30 mg by mouth daily at bedtime as needed for sleep, Disp: , Rfl:   Allergies   Allergen Reactions    Cortisone Chest Pain    Dye [Iodinated Contrast Media] Chest Pain    Trulicity [Dulaglutide] Bradycardia      There were no vitals filed for this visit.

## 2024-02-09 NOTE — PROGRESS NOTES
Consultation - Radiation Oncology      MRN:[id number] : 1950  Encounter: [id number]  Patient Information: Gladis Villalobos      CHIEF COMPLAINT  Chief Complaint   Patient presents with    Adenocarcinoma of gallbladder    Consult     Cancer Staging   Adenocarcinoma of gallbladder (HCC)  Staging form: Gallbladder, AJCC 8th Edition  - Clinical: cT2, cN0, cM0 - Signed by Duncan Mckeon MD on 10/13/2023  Total positive nodes: 0  - Pathologic stage from 10/19/2023: Stage IVB (pT2, pN0, cM1) - Signed by Duncan Mckeon MD on 10/19/2023  Stage prefix: Initial diagnosis  Total positive nodes: 0    Assessment and Plan:  Ms. Gladis Villalobos is a 73 year old woman with recently diagnosed Stage II (pT2 cN0M0) gallbladder carcinoma discovered at the time of laparoscopic cholecystectomy. She has declined extended cholecystectomy including hepatic and LN dissesction and is seen today in consideration of post-operative RT.     We reviewed the general treatment paradigms for localized gallbladder carcinoma. We reviewed that standard of care would be to proceed with additional surgery given the high risk for liver and/or LN involvement even with pT2 lesions. Post-operatively she could be treated with chemotherapy alone (per BILCAP) or chemoRT (per SWOG 0809) depending on surgical findings. At this time she has declined additional surgery; in the absence of any additional resection I feel she has a high risk for unaddressed local disease and would benefit from chemoRT.     We reviewed side effects of chemoRT in detail including the potential for fatigue, diarrhea, nausea, anorexia, and weight loss. There is a small risk for serious adverse effect (e.g. bowel injury, perforation, bleeding), though this risk is low overall.     The patient, her son, and her daughter in law were all given the opportunity to ask questions. At this time the patient would like to consider her options before making a final decision. I will  remain available as additional questions arise and will coordinate care with medical oncology should she decide to proceed with chemoRT.     Summary:   - Reinforced prior recommendation for additional surgical resection both for diagnostic and therapeutic purpose; patient declined.   - Recommend post-operative chemoRT to 50-54 Gy in 25-30 fractions with concurrent capecitabine; patient considering options.            History of Present Illness   Ms. Gladis Villalobos is a 73 year old woman who was in her usual state of health until she presented with RUQ pain and underwent laparoscopic cholecystectomy (9/2/23). Pathology demonstrated moderately differentiated adenocarcinoma arising in a background of chronic erosive cholecystitis, tumor present in the perimuscular fibrous tissue, PNI+ (pT2Nx).     The patient was seen by Dr. Mckeon (10/13/23) with recommendation for MRI Abdomen as well as possible liver segment 4B/5 resection and portal LN dissection. MRI abdomen (10/16/23) demonstrated multiple hepatic mass lesions suspicious for metastaases. IR guided biopsy of a right liver mass demonstrated only steatohepatitis, negative for malignancy. After consideration she underwent surgical robotic liver biopsy (11/15/23); pathology was consistent with hemangioma.     The patient was seen back by Dr. Mckeon and recommended for segment 4B/5 liver resection and LN dissection, however the patient declined surgical completion and was referred to our office for consideration of post-operative chemoRT.     Currently she is doing well overall. She has recovered nicely from her prior surgeries/biopsies without significant ongoing symptoms. She was seen by Dr. Baltazar (2/6/24) with tentative recommendation for capecitabine based chemoRT.      Historical Information   Oncology History   Adenocarcinoma of gallbladder (HCC)   9/2/2023 Surgery    Laparoscopic cholecystectomy:  Gallbladder, Gallbladder and contents ,  cholecystectomy:  -Moderately differentiated adenocarcinoma arising in a background of erosive chronic cholecystitis with high grade surface dysplasia and dense serosal adhesions.  -Tumor is present in perimuscular fibrous tissue (AJCC 8th edition pT2.) The surgical margins are uninvolved. Perineural invasion identified. No vascular invasion identified.   -Cholelithiasis.     2023 Initial Diagnosis    Adenocarcinoma of gallbladder (HCC)     10/13/2023 -  Cancer Staged    Staging form: Gallbladder, AJCC 8th Edition  - Clinical: cT2, cN0, cM0 - Signed by Duncan Mckeon MD on 10/13/2023  Total positive nodes: 0       10/19/2023 -  Cancer Staged    Staging form: Gallbladder, AJCC 8th Edition  - Pathologic stage from 10/19/2023: Stage IVB (pT2, pN0, cM1) - Signed by Duncan Mckeon MD on 10/19/2023  Stage prefix: Initial diagnosis  Total positive nodes: 0             Past Medical History:   Diagnosis Date    Diabetes mellitus (HCC)     H/O cancer of gall bladder     Hypertension      Past Surgical History:   Procedure Laterality Date    CATARACT EXTRACTION       SECTION      CHOLECYSTECTOMY LAPAROSCOPIC N/A 2023    Procedure: CHOLECYSTECTOMY LAPAROSCOPIC;  Surgeon: Noe Ha DO;  Location: [facility];  Service: General    IR BIOPSY LIVER MASS  10/24/2023       Family History   Problem Relation Age of Onset    Brain cancer Father        Social History   Social History     Substance and Sexual Activity   Alcohol Use Never     Social History     Substance and Sexual Activity   Drug Use Not Currently     Social History     Tobacco Use   Smoking Status Never    Passive exposure: Never   Smokeless Tobacco Never         Meds/Allergies     Current Outpatient Medications:     acetaminophen (TYLENOL) 325 mg tablet, Take 2 tablets (650 mg total) by mouth every 6 (six) hours as needed for mild pain, headaches or fever, Disp: , Rfl: 0    ALPRAZolam (XANAX) 1 mg tablet, Take 1 tablet (1 mg total) by mouth  see administration instructions Take 1 tablet 30 minutes prior to MRI, take second tablet immediately before MRI if needed., Disp: 2 tablet, Rfl: 0    amLODIPine (NORVASC) 5 mg tablet, Take 5 mg by mouth daily at bedtime, Disp: , Rfl:     aspirin (ECOTRIN LOW STRENGTH) 81 mg EC tablet, Take 81 mg by mouth daily, Disp: , Rfl:     famotidine (PEPCID) 20 mg tablet, Take 20 mg by mouth daily, Disp: , Rfl:     glipiZIDE (GLUCOTROL) 5 mg tablet, Take 10 mg by mouth 2 (two) times a day, Disp: , Rfl:     metoprolol tartrate (LOPRESSOR) 50 mg tablet, Take 50 mg by mouth daily, Disp: , Rfl:     naloxone (NARCAN) 4 mg/0.1 mL nasal spray, Administer 1 spray into a nostril. If no response after 2-3 minutes, give another dose in the other nostril using a new spray. (Patient not taking: Reported on 12/15/2023), Disp: 1 each, Rfl: 1    oxyCODONE (Roxicodone) 5 immediate release tablet, Take 1 tablet (5 mg total) by mouth every 6 (six) hours as needed for moderate pain Max Daily Amount: 20 mg (Patient not taking: Reported on 12/15/2023), Disp: 10 tablet, Rfl: 0    sitaGLIPtin-metFORMIN (Janumet)  MG per tablet, Take 1 tablet by mouth 2 (two) times a day with meals, Disp: , Rfl:     temazepam (RESTORIL) 22.5 MG capsule, Take 30 mg by mouth daily at bedtime as needed for sleep, Disp: , Rfl:   Allergies   Allergen Reactions    Cortisone Chest Pain    Dye [Iodinated Contrast Media] Chest Pain     Per son heart stopped for a few seconds    Trulicity [Dulaglutide] Bradycardia     Review of Systems   Constitutional:  Positive for appetite change and fatigue.   HENT:  Positive for dental problem (full dentures).    Eyes:         Wears glasses   Respiratory:  Positive for shortness of breath (sometimes).    Cardiovascular:  Positive for palpitations and leg swelling (B/L lower legs).   Gastrointestinal:  Positive for abdominal pain (sometimes) and diarrhea (sometimes). Negative for abdominal distention, constipation and nausea.    Genitourinary: Negative.    Musculoskeletal:  Positive for back pain.   Skin: Negative.    Allergic/Immunologic: Negative.    Neurological:  Positive for dizziness, light-headedness, numbness (B/L feet) and headaches (daily).   Hematological: Negative.    Psychiatric/Behavioral:  Positive for sleep disturbance. Negative for suicidal ideas. The patient is nervous/anxious.         Feels depressed        OBJECTIVE:   /78   Pulse 60   Temp (!) 97.1 °F (36.2 °C)   Resp 16   Wt 67.6 kg (149 lb)   SpO2 98%   BMI 27.25 kg/m²   Pain Assessment:  0  Performance Status: ECOG/Zubrod/WHO: 2 - Symptomatic, <50% confined to bed    Physical Exam   Well appearing. NAD. No increased work of breathing. Extremities warm and well perfused. No jaundice noted.     RESULTS  Lab Results    Chemistry        Component Value Date/Time    K 3.9 02/05/2024 1247     (H) 02/05/2024 1247    CO2 27 02/05/2024 1247    BUN 21 02/05/2024 1247    CREATININE 0.67 02/05/2024 1247        Component Value Date/Time    CALCIUM 9.1 02/05/2024 1247    ALKPHOS 59 02/05/2024 1247    AST 18 02/05/2024 1247    ALT 27 02/05/2024 1247            Lab Results   Component Value Date    WBC 7.46 02/05/2024    HGB 11.6 02/05/2024    HCT 35.4 02/05/2024    MCV 90 02/05/2024     02/05/2024         Imaging Studies  No results found.    Pathology: As above.     ASSESSMENT  1. Adenocarcinoma of gallbladder (HCC)  Ambulatory referral to Radiation Oncology        Cancer Staging   Adenocarcinoma of gallbladder (HCC)  Staging form: Gallbladder, AJCC 8th Edition  - Clinical: cT2, cN0, cM0 - Signed by Duncan Mckeon MD on 10/13/2023  Total positive nodes: 0  - Pathologic stage from 10/19/2023: Stage IVB (pT2, pN0, cM1) - Signed by Duncan Mckeon MD on 10/19/2023  Stage prefix: Initial diagnosis  Total positive nodes: 0        PLAN/DISCUSSION  No orders of the defined types were placed in this encounter.       Sanket Infante MD  2/9/2024,11:14  "AM    Total time spent reviewing EMR, seeing patient in clinic visit, documenting visit, placing treatment orders, and communicating with other medical providers: 50 minutes.     Portions of the record may have been created with voice recognition software.  Occasional wrong word or \"sound a like\" substitutions may have occurred due to the inherent limitations of voice recognition software.  Read the chart carefully and recognize, using context, where substitutions have occurred.          "

## 2024-02-09 NOTE — TELEPHONE ENCOUNTER
Called patient son and daughter gustabo, no answer lvm to return my call for scheduling preference.

## 2024-02-12 NOTE — TELEPHONE ENCOUNTER
Called patient son and daughter gustabo, no answer. Lvm to return my call for scheduling preferences

## 2024-02-14 ENCOUNTER — TELEPHONE (OUTPATIENT)
Dept: HEMATOLOGY ONCOLOGY | Facility: CLINIC | Age: 74
End: 2024-02-14

## 2024-02-14 NOTE — TELEPHONE ENCOUNTER
Patients son/care giver called me back after several attempts to schedule iron infusions. Balta called me today asking why patient needs iron infusions and who ordered them. I advised to patient that  is the doctor who requested patient has Venofer X3. Per Balta patient no longer sees  and sees  for iron. Marcellus Gifford wants to know if iron infusions are still needed due to  stating a Multivitamin and iron pills will help patient. I advised to balta I will follow up with provider to see if Iron is still needed. Per Balta he will like to know if there is something else that can help patient due to patient not liking Ivs.         Please advise.

## 2024-02-15 NOTE — TELEPHONE ENCOUNTER
Patient can take a multivitamin with iron as advised by Dr. Baltazar. Will cancel IV iron order.  Will update patients son. Thank you.

## 2024-02-27 ENCOUNTER — TELEPHONE (OUTPATIENT)
Dept: HEMATOLOGY ONCOLOGY | Facility: CLINIC | Age: 74
End: 2024-02-27

## 2024-02-27 NOTE — TELEPHONE ENCOUNTER
Appointment Change  Cancel, Reschedule, Change to Virtual      Who are you speaking with? Child   If it is not the patient, is the caller listed on the communication consent form? Yes   Which provider is the appointment scheduled with? Dr. Mckeon   When was the original appointment scheduled?    Please list date and time 1/18/24 @ 245   At which location is the appointment scheduled to take place? [facility] ([address]   Was the appointment rescheduled?     Was the appointment changed from an in person visit to a virtual visit?    If so, please list the details of the change. 3/7/24 @ 315   What is the reason for the appointment change? Reschedule        Was STAR transport scheduled? No   Does STAR transport need to be scheduled for the new visit (if applicable) No   Does the patient need an infusion appointment rescheduled? No   Does the patient have an upcoming infusion appointment scheduled? If so, when? No   Is the patient undergoing chemotherapy? No   For appointments cancelled with less than 24 hours:  Was the no-show policy reviewed? N/A

## 2024-03-07 ENCOUNTER — OFFICE VISIT (OUTPATIENT)
Dept: SURGICAL ONCOLOGY | Facility: CLINIC | Age: 74
End: 2024-03-07
Payer: COMMERCIAL

## 2024-03-07 VITALS
SYSTOLIC BLOOD PRESSURE: 132 MMHG | HEIGHT: 62 IN | BODY MASS INDEX: 27.6 KG/M2 | HEART RATE: 74 BPM | DIASTOLIC BLOOD PRESSURE: 82 MMHG | OXYGEN SATURATION: 98 % | WEIGHT: 150 LBS

## 2024-03-07 DIAGNOSIS — C23 ADENOCARCINOMA OF GALLBLADDER (HCC): Primary | ICD-10-CM

## 2024-03-07 PROCEDURE — 99214 OFFICE O/P EST MOD 30 MIN: CPT | Performed by: SURGERY

## 2024-03-07 RX ORDER — ALPRAZOLAM 1 MG/1
1 TABLET ORAL SEE ADMIN INSTRUCTIONS
Qty: 2 TABLET | Refills: 0 | Status: SHIPPED | OUTPATIENT
Start: 2024-03-07

## 2024-03-07 RX ORDER — TELMISARTAN 20 MG/1
TABLET ORAL
COMMUNITY
Start: 2024-02-16

## 2024-03-07 NOTE — LETTER
March 7, 2024     Noe Ha DO  [address] [facility] [address]  [address]  [facility] [address]    Patient: Gladis Villalobos   YOB: 1950   Date of Visit: 3/7/2024       Dear Dr. Ha:    Thank you for referring Gladis Villalobos to me for evaluation. Below are my notes for this consultation.    If you have questions, please do not hesitate to call me. I look forward to following your patient along with you.         Sincerely,        Duncan Mckeon MD        CC: MD Sanket Medina MD Christopher M Smith, MD Darius Desai, MD  3/7/2024  4:00 PM  Sign when Signing Visit               Surgical Oncology Follow Up       [facility] [place]  [address]  [address]  [facility] [address]  [phone]    Gladis Villalobos  1950  [id number]  [facility] [place]  [address]  [address]  [facility] [address]  [phone]    Diagnoses and all orders for this visit:    Adenocarcinoma of gallbladder (HCC)  -     CT chest wo contrast; Future  -     MRI abdomen wo contrast; Future  -     ALPRAZolam (XANAX) 1 mg tablet; Take 1 tablet (1 mg total) by mouth see administration instructions Take 1 tablet 30 minutes prior to MRI, take second tablet immediately before MRI if needed.  -     Ambulatory referral to Palliative Care; Future    Other orders  -     telmisartan (MICARDIS) 20 MG tablet; TAKE 2 TABLETS BY MOUTH ONCE DAILY FOR 90 DAYS        No chief complaint on file.      Return in about 6 months (around 9/7/2024) for Office Visit, Imaging - See orders, with Ivanna.      Oncology History   Adenocarcinoma of gallbladder (HCC)   9/2/2023 Surgery    Laparoscopic cholecystectomy:  Gallbladder, Gallbladder and contents , cholecystectomy:  -Moderately differentiated adenocarcinoma arising in a background of erosive  chronic cholecystitis with high grade surface dysplasia and dense serosal adhesions.  -Tumor is present in perimuscular fibrous tissue (AJCC 8th edition pT2.) The surgical margins are uninvolved. Perineural invasion identified. No vascular invasion identified.   -Cholelithiasis.     9/28/2023 Initial Diagnosis    Adenocarcinoma of gallbladder (HCC)     10/13/2023 -  Cancer Staged    Staging form: Gallbladder, AJCC 8th Edition  - Clinical: cT2, cN0, cM0 - Signed by Duncan Mckeon MD on 10/13/2023  Total positive nodes: 0       10/19/2023 -  Cancer Staged    Staging form: Gallbladder, AJCC 8th Edition  - Pathologic stage from 10/19/2023: pT2, pN0, cM0 - Signed by Sanket Infante MD on 2/9/2024  Stage prefix: Initial diagnosis  Total positive nodes: 0           Staging: T2N0M0 gallbladder carcinoma, October 2023  Benign liver lesions  Treatment history: Laparoscopic cholecystectomy, October 2023  Liver biopsy, November 2023  Declined additional surgery, chemo and chemoradiation  Current treatment: Declines surgery and chemoradiation  Disease status: SHELIA    History of Present Illness: Patient returns in follow-up.  She has met with medical oncology and radiation oncology.  She has decided to decline all further therapy.  She comes in now to discuss follow-up.  She is feeling well except for some occasional reflux and right upper quadrant pain that comes and goes.    Review of Systems  Complete ROS Surg Onc:   Complete ROS Surg Onc:   Constitutional: The patient denies new or recent history of general fatigue, no recent weight loss, no change in appetite.   Eyes: No complaints of visual problems, no scleral icterus.   ENT: no complaints of ear pain, no hoarseness, no difficulty swallowing,  no tinnitus and no new masses in head, oral cavity, or neck.   Cardiovascular: No complaints of chest pain, no palpitations, no ankle edema.   Respiratory: No complaints of shortness of breath, no cough.   Gastrointestinal: No  complaints of jaundice, no bloody stools, no pale stools.   Genitourinary: No complaints of dysuria, no hematuria, no nocturia, no frequent urination, no urethral discharge.   Musculoskeletal: No complaints of weakness, paralysis, joint stiffness or arthralgias.  Integumentary: No complaints of rash, no new lesions.   Neurological: No complaints of convulsions, no seizures, no dizziness.   Hematologic/Lymphatic: No complaints of easy bruising.   Endocrine:  No hot or cold intolerance.  No polydipsia, polyphagia, or polyuria.  Allergy/immunology:  No environmental allergies.  No food allergies.  Not immunocompromised.  Skin:  No pallor or rash.  No wound.        Patient Active Problem List   Diagnosis   • Acute cholecystitis   • Near syncope   • Diabetes (HCC)   • Hypertension   • Elevated TSH   • Hyperglycemia   • Lightheadedness   • Elevated d-dimer   • Acute blood loss anemia   • Adenocarcinoma of gallbladder (HCC)   • Iron deficiency anemia, unspecified   • Vitamin B 12 deficiency     Past Medical History:   Diagnosis Date   • Diabetes mellitus (HCC)    • H/O cancer of gall bladder    • Hypertension      Past Surgical History:   Procedure Laterality Date   • CATARACT EXTRACTION     •  SECTION     • CHOLECYSTECTOMY LAPAROSCOPIC N/A 2023    Procedure: CHOLECYSTECTOMY LAPAROSCOPIC;  Surgeon: Noe Ha DO;  Location: [facility] OR;  Service: General   • IR BIOPSY LIVER MASS  10/24/2023     Family History   Problem Relation Age of Onset   • Brain cancer Father      Social History     Socioeconomic History   • Marital status: /Civil Union     Spouse name: Not on file   • Number of children: Not on file   • Years of education: Not on file   • Highest education level: Not on file   Occupational History   • Not on file   Tobacco Use   • Smoking status: Never     Passive exposure: Never   • Smokeless tobacco: Never   Vaping Use   • Vaping status: Never Used   Substance and Sexual Activity   •  Alcohol use: Never   • Drug use: Not Currently   • Sexual activity: Not on file   Other Topics Concern   • Not on file   Social History Narrative   • Not on file     Social Determinants of Health     Financial Resource Strain: Not on file   Food Insecurity: No Food Insecurity (8/31/2023)    Hunger Vital Sign    • Worried About Running Out of Food in the Last Year: Never true    • Ran Out of Food in the Last Year: Never true   Transportation Needs: No Transportation Needs (8/31/2023)    PRAPARE - Transportation    • Lack of Transportation (Medical): No    • Lack of Transportation (Non-Medical): No   Physical Activity: Not on file   Stress: Not on file   Social Connections: Not on file   Intimate Partner Violence: Not on file   Housing Stability: Low Risk  (8/31/2023)    Housing Stability Vital Sign    • Unable to Pay for Housing in the Last Year: No    • Number of Places Lived in the Last Year: 2    • Unstable Housing in the Last Year: No       Current Outpatient Medications:   •  acetaminophen (TYLENOL) 325 mg tablet, Take 2 tablets (650 mg total) by mouth every 6 (six) hours as needed for mild pain, headaches or fever, Disp: , Rfl: 0  •  ALPRAZolam (XANAX) 1 mg tablet, Take 1 tablet (1 mg total) by mouth see administration instructions Take 1 tablet 30 minutes prior to MRI, take second tablet immediately before MRI if needed., Disp: 2 tablet, Rfl: 0  •  amLODIPine (NORVASC) 5 mg tablet, Take 5 mg by mouth daily at bedtime, Disp: , Rfl:   •  aspirin (ECOTRIN LOW STRENGTH) 81 mg EC tablet, Take 81 mg by mouth daily, Disp: , Rfl:   •  famotidine (PEPCID) 20 mg tablet, Take 20 mg by mouth daily, Disp: , Rfl:   •  glipiZIDE (GLUCOTROL) 5 mg tablet, Take 10 mg by mouth 2 (two) times a day, Disp: , Rfl:   •  metoprolol tartrate (LOPRESSOR) 50 mg tablet, Take 50 mg by mouth daily, Disp: , Rfl:   •  sitaGLIPtin-metFORMIN (Janumet)  MG per tablet, Take 1 tablet by mouth 2 (two) times a day with meals, Disp: , Rfl:   •   telmisartan (MICARDIS) 20 MG tablet, TAKE 2 TABLETS BY MOUTH ONCE DAILY FOR 90 DAYS, Disp: , Rfl:   •  temazepam (RESTORIL) 22.5 MG capsule, Take 30 mg by mouth daily at bedtime as needed for sleep, Disp: , Rfl:   •  naloxone (NARCAN) 4 mg/0.1 mL nasal spray, Administer 1 spray into a nostril. If no response after 2-3 minutes, give another dose in the other nostril using a new spray. (Patient not taking: Reported on 12/15/2023), Disp: 1 each, Rfl: 1  •  oxyCODONE (Roxicodone) 5 immediate release tablet, Take 1 tablet (5 mg total) by mouth every 6 (six) hours as needed for moderate pain Max Daily Amount: 20 mg (Patient not taking: Reported on 12/15/2023), Disp: 10 tablet, Rfl: 0  Allergies   Allergen Reactions   • Cortisone Chest Pain   • Dye [Iodinated Contrast Media] Chest Pain     Per son heart stopped for a few seconds   • Trulicity [Dulaglutide] Bradycardia     Vitals:    03/07/24 1527   BP: 132/82   Pulse: 74   SpO2: 98%       Physical Exam  Constitutional: General appearance: The Patient is well-developed and well-nourished who appears the stated age in no acute distress. Patient is pleasant and talkative.     HEENT:  Normocephalic.  Sclerae are anicteric. Mucous membranes are moist. Neck is supple without adenopathy. No JVD.     Chest: The lungs are clear to auscultation.     Cardiac: Heart is regular rate.     Abdomen: Abdomen is soft, minimal tenderness in the right upper quadrant, non-distended and without masses.     Extremities: There is no clubbing or cyanosis. There is no edema.  Symmetric.  Neuro: Grossly nonfocal. Gait is normal.     Lymphatic: No evidence of cervical adenopathy bilaterally.   No evidence of axillary adenopathy bilaterally.   Skin: Warm, anicteric.    Psych:  Patient is pleasant and talkative.  Breasts:        Pathology:  [unfilled]    Labs:      Imaging  No results found.  I personally reviewed and interpreted the above laboratory and imaging data.    Discussion/Summary:  73-year-old female with a clinical T2N0M0 gallbladder carcinoma.  The liver lesions were suspicious by MRI, but on final surgical biopsy were benign hemangiomas.  At this time, I have recommended that she undergo segment 4B/5 liver resection with portal lymph node dissection.  She is not inclined to have any further surgical intervention.  I discussed that this would be the standard of care, and this should be followed by chemo and radiation. I did discuss that her initial tumor was on the peritoneal side of the gallbladder and there was perineural invasion.  I did discuss that the perineural invasion is a high risk feature.  We discussed that with T2 gallbladder carcinoma, there can be a 20% risk of recurrence in 5 years as well as a 30% risk of carlee metastasis.  She does not wish to have surgery, I would recommend that she undergo chemoradiation.  She then met with medical and radiation oncology and has decided on no further treatment.  At this point, I would recommend continued observation.  We will plan on repeating her MRI and chest CT.  Given her contrast allergy, these will be done without contrast.  Finally, I discussed that since she is declining all further therapy, she should consider seeing palliative care.  While she is certainly not symptomatic at this time, she is at increased risk of developing cancer related symptoms and I would recommend she establish with palliative care now.  I will place the referral.  Assuming the CT and MRI are negative, I will call them with the results and see her again in 6 months with repeat imaging.  She and her family are agreeable to this.  All of their questions were answered.

## 2024-03-07 NOTE — PROGRESS NOTES
Surgical Oncology Follow Up       [facility] [place]  [address]  [address]  [place] [id number] [phone]    Gladis Pucketta  1950  [id number]  [facility] [place]  [facility] [place]  [address]  [address]  [place] [address] [phone]    Diagnoses and all orders for this visit:    Adenocarcinoma of gallbladder (HCC)  -     CT chest wo contrast; Future  -     MRI abdomen wo contrast; Future  -     ALPRAZolam (XANAX) 1 mg tablet; Take 1 tablet (1 mg total) by mouth see administration instructions Take 1 tablet 30 minutes prior to MRI, take second tablet immediately before MRI if needed.  -     Ambulatory referral to Palliative Care; Future    Other orders  -     telmisartan (MICARDIS) 20 MG tablet; TAKE 2 TABLETS BY MOUTH ONCE DAILY FOR 90 DAYS        No chief complaint on file.      Return in about 6 months (around 9/7/2024) for Office Visit, Imaging - See orders, with Ivanna.      Oncology History   Adenocarcinoma of gallbladder (HCC)   9/2/2023 Surgery    Laparoscopic cholecystectomy:  Gallbladder, Gallbladder and contents , cholecystectomy:  -Moderately differentiated adenocarcinoma arising in a background of erosive chronic cholecystitis with high grade surface dysplasia and dense serosal adhesions.  -Tumor is present in perimuscular fibrous tissue (AJCC 8th edition pT2.) The surgical margins are uninvolved. Perineural invasion identified. No vascular invasion identified.   -Cholelithiasis.     9/28/2023 Initial Diagnosis    Adenocarcinoma of gallbladder (HCC)     10/13/2023 -  Cancer Staged    Staging form: Gallbladder, AJCC 8th Edition  - Clinical: cT2, cN0, cM0 - Signed by Duncan Mckeon MD on 10/13/2023  Total positive nodes: 0       10/19/2023 -  Cancer Staged    Staging form: Gallbladder, AJCC 8th Edition  - Pathologic stage from  10/19/2023: pT2, pN0, cM0 - Signed by Sanket Infante MD on 2/9/2024  Stage prefix: Initial diagnosis  Total positive nodes: 0           Staging: T2N0M0 gallbladder carcinoma, October 2023  Benign liver lesions  Treatment history: Laparoscopic cholecystectomy, October 2023  Liver biopsy, November 2023  Declined additional surgery, chemo and chemoradiation  Current treatment: Declines surgery and chemoradiation  Disease status: SHELIA    History of Present Illness: Patient returns in follow-up.  She has met with medical oncology and radiation oncology.  She has decided to decline all further therapy.  She comes in now to discuss follow-up.  She is feeling well except for some occasional reflux and right upper quadrant pain that comes and goes.    Review of Systems  Complete ROS Surg Onc:   Complete ROS Surg Onc:   Constitutional: The patient denies new or recent history of general fatigue, no recent weight loss, no change in appetite.   Eyes: No complaints of visual problems, no scleral icterus.   ENT: no complaints of ear pain, no hoarseness, no difficulty swallowing,  no tinnitus and no new masses in head, oral cavity, or neck.   Cardiovascular: No complaints of chest pain, no palpitations, no ankle edema.   Respiratory: No complaints of shortness of breath, no cough.   Gastrointestinal: No complaints of jaundice, no bloody stools, no pale stools.   Genitourinary: No complaints of dysuria, no hematuria, no nocturia, no frequent urination, no urethral discharge.   Musculoskeletal: No complaints of weakness, paralysis, joint stiffness or arthralgias.  Integumentary: No complaints of rash, no new lesions.   Neurological: No complaints of convulsions, no seizures, no dizziness.   Hematologic/Lymphatic: No complaints of easy bruising.   Endocrine:  No hot or cold intolerance.  No polydipsia, polyphagia, or polyuria.  Allergy/immunology:  No environmental allergies.  No food allergies.  Not immunocompromised.  Skin:   No pallor or rash.  No wound.        Patient Active Problem List   Diagnosis    Acute cholecystitis    Near syncope    Diabetes (HCC)    Hypertension    Elevated TSH    Hyperglycemia    Lightheadedness    Elevated d-dimer    Acute blood loss anemia    Adenocarcinoma of gallbladder (HCC)    Iron deficiency anemia, unspecified    Vitamin B 12 deficiency     Past Medical History:   Diagnosis Date    Diabetes mellitus (HCC)     H/O cancer of gall bladder     Hypertension      Past Surgical History:   Procedure Laterality Date    CATARACT EXTRACTION       SECTION      CHOLECYSTECTOMY LAPAROSCOPIC N/A 2023    Procedure: CHOLECYSTECTOMY LAPAROSCOPIC;  Surgeon: Noe Ha DO;  Location: [facility];  Service: General    IR BIOPSY LIVER MASS  10/24/2023     Family History   Problem Relation Age of Onset    Brain cancer Father      Social History     Socioeconomic History    Marital status: /Civil Union     Spouse name: Not on file    Number of children: Not on file    Years of education: Not on file    Highest education level: Not on file   Occupational History    Not on file   Tobacco Use    Smoking status: Never     Passive exposure: Never    Smokeless tobacco: Never   Vaping Use    Vaping status: Never Used   Substance and Sexual Activity    Alcohol use: Never    Drug use: Not Currently    Sexual activity: Not on file   Other Topics Concern    Not on file   Social History Narrative    Not on file     Social Determinants of Health     Financial Resource Strain: Not on file   Food Insecurity: No Food Insecurity (2023)    Hunger Vital Sign     Worried About Running Out of Food in the Last Year: Never true     Ran Out of Food in the Last Year: Never true   Transportation Needs: No Transportation Needs (2023)    PRAPARE - Transportation     Lack of Transportation (Medical): No     Lack of Transportation (Non-Medical): No   Physical Activity: Not on file   Stress: Not on file   Social  Connections: Not on file   Intimate Partner Violence: Not on file   Housing Stability: Low Risk  (8/31/2023)    Housing Stability Vital Sign     Unable to Pay for Housing in the Last Year: No     Number of Places Lived in the Last Year: 2     Unstable Housing in the Last Year: No       Current Outpatient Medications:     acetaminophen (TYLENOL) 325 mg tablet, Take 2 tablets (650 mg total) by mouth every 6 (six) hours as needed for mild pain, headaches or fever, Disp: , Rfl: 0    ALPRAZolam (XANAX) 1 mg tablet, Take 1 tablet (1 mg total) by mouth see administration instructions Take 1 tablet 30 minutes prior to MRI, take second tablet immediately before MRI if needed., Disp: 2 tablet, Rfl: 0    amLODIPine (NORVASC) 5 mg tablet, Take 5 mg by mouth daily at bedtime, Disp: , Rfl:     aspirin (ECOTRIN LOW STRENGTH) 81 mg EC tablet, Take 81 mg by mouth daily, Disp: , Rfl:     famotidine (PEPCID) 20 mg tablet, Take 20 mg by mouth daily, Disp: , Rfl:     glipiZIDE (GLUCOTROL) 5 mg tablet, Take 10 mg by mouth 2 (two) times a day, Disp: , Rfl:     metoprolol tartrate (LOPRESSOR) 50 mg tablet, Take 50 mg by mouth daily, Disp: , Rfl:     sitaGLIPtin-metFORMIN (Janumet)  MG per tablet, Take 1 tablet by mouth 2 (two) times a day with meals, Disp: , Rfl:     telmisartan (MICARDIS) 20 MG tablet, TAKE 2 TABLETS BY MOUTH ONCE DAILY FOR 90 DAYS, Disp: , Rfl:     temazepam (RESTORIL) 22.5 MG capsule, Take 30 mg by mouth daily at bedtime as needed for sleep, Disp: , Rfl:     naloxone (NARCAN) 4 mg/0.1 mL nasal spray, Administer 1 spray into a nostril. If no response after 2-3 minutes, give another dose in the other nostril using a new spray. (Patient not taking: Reported on 12/15/2023), Disp: 1 each, Rfl: 1    oxyCODONE (Roxicodone) 5 immediate release tablet, Take 1 tablet (5 mg total) by mouth every 6 (six) hours as needed for moderate pain Max Daily Amount: 20 mg (Patient not taking: Reported on 12/15/2023), Disp: 10 tablet,  Rfl: 0  Allergies   Allergen Reactions    Cortisone Chest Pain    Dye [Iodinated Contrast Media] Chest Pain     Per son heart stopped for a few seconds    Trulicity [Dulaglutide] Bradycardia     Vitals:    03/07/24 1527   BP: 132/82   Pulse: 74   SpO2: 98%       Physical Exam  Constitutional: General appearance: The Patient is well-developed and well-nourished who appears the stated age in no acute distress. Patient is pleasant and talkative.     HEENT:  Normocephalic.  Sclerae are anicteric. Mucous membranes are moist. Neck is supple without adenopathy. No JVD.     Chest: The lungs are clear to auscultation.     Cardiac: Heart is regular rate.     Abdomen: Abdomen is soft, minimal tenderness in the right upper quadrant, non-distended and without masses.     Extremities: There is no clubbing or cyanosis. There is no edema.  Symmetric.  Neuro: Grossly nonfocal. Gait is normal.     Lymphatic: No evidence of cervical adenopathy bilaterally.   No evidence of axillary adenopathy bilaterally.   Skin: Warm, anicteric.    Psych:  Patient is pleasant and talkative.  Breasts:        Pathology:  [unfilled]    Labs:      Imaging  No results found.  I personally reviewed and interpreted the above laboratory and imaging data.    Discussion/Summary: 73-year-old female with a clinical T2N0M0 gallbladder carcinoma.  The liver lesions were suspicious by MRI, but on final surgical biopsy were benign hemangiomas.  At this time, I have recommended that she undergo segment 4B/5 liver resection with portal lymph node dissection.  She is not inclined to have any further surgical intervention.  I discussed that this would be the standard of care, and this should be followed by chemo and radiation. I did discuss that her initial tumor was on the peritoneal side of the gallbladder and there was perineural invasion.  I did discuss that the perineural invasion is a high risk feature.  We discussed that with T2 gallbladder carcinoma, there  can be a 20% risk of recurrence in 5 years as well as a 30% risk of carlee metastasis.  She does not wish to have surgery, I would recommend that she undergo chemoradiation.  She then met with medical and radiation oncology and has decided on no further treatment.  At this point, I would recommend continued observation.  We will plan on repeating her MRI and chest CT.  Given her contrast allergy, these will be done without contrast.  Finally, I discussed that since she is declining all further therapy, she should consider seeing palliative care.  While she is certainly not symptomatic at this time, she is at increased risk of developing cancer related symptoms and I would recommend she establish with palliative care now.  I will place the referral.  Assuming the CT and MRI are negative, I will call them with the results and see her again in 6 months with repeat imaging.  She and her family are agreeable to this.  All of their questions were answered.

## 2024-04-16 ENCOUNTER — TELEPHONE (OUTPATIENT)
Dept: HEMATOLOGY ONCOLOGY | Facility: CLINIC | Age: 74
End: 2024-04-16

## 2024-04-16 DIAGNOSIS — C23 ADENOCARCINOMA OF GALLBLADDER (HCC): ICD-10-CM

## 2024-04-16 RX ORDER — ALPRAZOLAM 1 MG/1
1 TABLET ORAL SEE ADMIN INSTRUCTIONS
Qty: 2 TABLET | Refills: 0 | Status: SHIPPED | OUTPATIENT
Start: 2024-04-16

## 2024-04-16 NOTE — TELEPHONE ENCOUNTER
Medication Refill Request   Who are you speaking with? Child   If it is not the patient, are they listed on an active communication consent form?     Yes   Which medication is being requested for refill?  Please list medication name and dosage  Xanax 1MG    How many pills does the patient have left?  0   Preferred Pharmacy / Address   [facility]      [address], [facility] [address]      Phone: [phone]    Who is the prescribing provider? Dr. Mckeon   Call back number  [phone]   Relevant Information  Pt needs this for her MRI.

## 2024-04-18 ENCOUNTER — HOSPITAL ENCOUNTER (OUTPATIENT)
Dept: MRI IMAGING | Facility: HOSPITAL | Age: 74
Discharge: HOME | End: 2024-04-18
Attending: SURGERY
Payer: COMMERCIAL

## 2024-04-18 DIAGNOSIS — C23 ADENOCARCINOMA OF GALLBLADDER (HCC): ICD-10-CM

## 2024-04-18 PROCEDURE — 74181 MRI ABDOMEN W/O CONTRAST: CPT

## (undated) DEVICE — TIP COVER ACCESSORY

## (undated) DEVICE — BLADELESS OBTURATOR: Brand: WECK VISTA

## (undated) DEVICE — NEPTUNE E-SEP SMOKE EVACUATION PENCIL, COATED, 70MM BLADE, PUSH BUTTON SWITCH: Brand: NEPTUNE E-SEP

## (undated) DEVICE — DRAPE EQUIPMENT RF WAND

## (undated) DEVICE — VISUALIZATION SYSTEM: Brand: CLEARIFY

## (undated) DEVICE — INTENDED FOR TISSUE SEPARATION, AND OTHER PROCEDURES THAT REQUIRE A SHARP SURGICAL BLADE TO PUNCTURE OR CUT.: Brand: BARD-PARKER SAFETY BLADES SIZE 15, STERILE

## (undated) DEVICE — ELECTRO LUBE IS A SINGLE PATIENT USE DEVICE THAT IS INTENDED TO BE USED ON ELECTROSURGICAL ELECTRODES TO REDUCE STICKING.: Brand: KEY SURGICAL ELECTRO LUBE

## (undated) DEVICE — ARM DRAPE

## (undated) DEVICE — METZENBAUM ADTEC SINGLE USE DISSECTING SCISSORS, SHAFT ONLY, MONOPOLAR, CURVED TO LEFT, WORKING LENGTH: 12 1/4", (310 MM), DIAM. 5 MM, INSULATED, DOUBLE ACTION, STERILE, DISPOSABLE, PACKAGE OF 10 PIECES: Brand: AESCULAP

## (undated) DEVICE — DRAPE SHEET THREE QUARTER

## (undated) DEVICE — PAD GROUNDING DUAL ADULT

## (undated) DEVICE — FENESTRATED BIPOLAR FORCEPS: Brand: ENDOWRIST

## (undated) DEVICE — AIRSEAL TUBE SMOKE EVAC LUMENX3 FILTERED

## (undated) DEVICE — 3M™ TEGADERM™ TRANSPARENT FILM DRESSING FRAME STYLE, 1624W, 2-3/8 IN X 2-3/4 IN (6 CM X 7 CM), 100/CT 4CT/CASE: Brand: 3M™ TEGADERM™

## (undated) DEVICE — SUT MONOCRYL 4-0 PS-2 18 IN Y496G

## (undated) DEVICE — GAUZE ROLL KITTNER

## (undated) DEVICE — TUBING SMOKE EVAC W/FILTRATION DEVICE PLUMEPORT ACTIV

## (undated) DEVICE — 3M™ STERI-STRIP™ REINFORCED ADHESIVE SKIN CLOSURES, R1547, 1/2 IN X 4 IN (12 MM X 100 MM), 6 STRIPS/ENVELOPE: Brand: 3M™ STERI-STRIP™

## (undated) DEVICE — ELECTRODE LAP L WIRE E-Z CLEAN 33CM -0100

## (undated) DEVICE — TIP-UP FENESTRATED GRASPER: Brand: ENDOWRIST

## (undated) DEVICE — GLOVE SRG BIOGEL ECLIPSE 8

## (undated) DEVICE — DRAPE LAPAROTOMY W/POUCHES

## (undated) DEVICE — ALLENTOWN LAP CHOLE APP PACK: Brand: CARDINAL HEALTH

## (undated) DEVICE — NEEDLE 25G X 1 1/2

## (undated) DEVICE — TISSUE RETRIEVAL SYSTEM: Brand: INZII RETRIEVAL SYSTEM

## (undated) DEVICE — ANTI-FOG SOLUTION WITH FOAM PAD: Brand: DEVON

## (undated) DEVICE — TROCAR: Brand: KII FIOS FIRST ENTRY

## (undated) DEVICE — SCD SEQUENTIAL COMPRESSION COMFORT SLEEVE MEDIUM KNEE LENGTH: Brand: KENDALL SCD

## (undated) DEVICE — BETHLEHEM MAJOR GENERAL PACK: Brand: CARDINAL HEALTH

## (undated) DEVICE — SUT VICRYL PLUS 0 UR-6 27IN VCP603H

## (undated) DEVICE — TROCAR: Brand: KII® SLEEVE

## (undated) DEVICE — HEAVY DUTY TABLE COVER: Brand: CONVERTORS

## (undated) DEVICE — COLUMN DRAPE

## (undated) DEVICE — LIGAMAX 5 MM ENDOSCOPIC MULTIPLE CLIP APPLIER: Brand: LIGAMAX

## (undated) DEVICE — CLAMP TOWEL TUBING DISPOSABLE

## (undated) DEVICE — ADHESIVE SKIN HIGH VISCOSITY EXOFIN 1ML

## (undated) DEVICE — SUT VICRYL 0 UR-6 27 IN J603H

## (undated) DEVICE — GLOVE INDICATOR PI UNDERGLOVE SZ 8 BLUE

## (undated) DEVICE — GAUZE SPONGES,8 PLY: Brand: CURITY

## (undated) DEVICE — MONOPOLAR CURVED SCISSORS: Brand: ENDOWRIST

## (undated) DEVICE — 3M™ IOBAN™ 2 ANTIMICROBIAL INCISE DRAPE 6650EZ: Brand: IOBAN™ 2

## (undated) DEVICE — 3M™ STERI-STRIP™ REINFORCED ADHESIVE SKIN CLOSURES, R1546, 1/4 IN X 4 IN (6 MM X 100 MM), 10 STRIPS/ENVELOPE: Brand: 3M™ STERI-STRIP™

## (undated) DEVICE — [HIGH FLOW INSUFFLATOR,  DO NOT USE IF PACKAGE IS DAMAGED,  KEEP DRY,  KEEP AWAY FROM SUNLIGHT,  PROTECT FROM HEAT AND RADIOACTIVE SOURCES.]: Brand: PNEUMOSURE

## (undated) DEVICE — IRRIG ENDO FLO TUBING

## (undated) DEVICE — TROCAR PORT ACCESS 12 X120MM W/BLDLS OPTICAL TIP AIRSEAL

## (undated) DEVICE — INTENDED FOR TISSUE SEPARATION, AND OTHER PROCEDURES THAT REQUIRE A SHARP SURGICAL BLADE TO PUNCTURE OR CUT.: Brand: BARD-PARKER SAFETY BLADES SIZE 11, STERILE

## (undated) DEVICE — GLOVE INDICATOR PI UNDERGLOVE SZ 7 BLUE

## (undated) DEVICE — TOWEL SET X-RAY

## (undated) DEVICE — CHLORAPREP HI-LITE 26ML ORANGE

## (undated) DEVICE — 4-PORT MANIFOLD: Brand: NEPTUNE 2

## (undated) DEVICE — GLOVE SRG BIOGEL 7

## (undated) DEVICE — CANNULA SEAL

## (undated) DEVICE — LIGHT HANDLE COVER SLEEVE DISP BLUE STELLAR

## (undated) DEVICE — HEMOSTATIC MATRIX SURGIFLO 8ML W/THROMBIN